# Patient Record
Sex: MALE | Race: WHITE | NOT HISPANIC OR LATINO | Employment: OTHER | URBAN - METROPOLITAN AREA
[De-identification: names, ages, dates, MRNs, and addresses within clinical notes are randomized per-mention and may not be internally consistent; named-entity substitution may affect disease eponyms.]

---

## 2023-09-06 PROBLEM — R07.9 CHEST PAIN: Status: ACTIVE | Noted: 2023-09-06

## 2023-09-06 PROBLEM — M47.26 OSTEOARTHRITIS OF SPINE WITH RADICULOPATHY, LUMBAR REGION: Status: ACTIVE | Noted: 2023-09-06

## 2023-09-06 PROBLEM — K57.90 DIVERTICULOSIS: Status: ACTIVE | Noted: 2023-09-06

## 2023-09-06 PROBLEM — E78.5 HYPERLIPIDEMIA: Status: ACTIVE | Noted: 2023-09-06

## 2023-09-06 PROBLEM — K86.2 PANCREATIC CYST (HHS-HCC): Status: ACTIVE | Noted: 2023-09-06

## 2023-09-06 PROBLEM — R91.8 PULMONARY NODULES/LESIONS, MULTIPLE: Status: ACTIVE | Noted: 2023-09-06

## 2023-09-06 PROBLEM — R06.02 SHORTNESS OF BREATH: Status: ACTIVE | Noted: 2023-09-06

## 2023-09-06 PROBLEM — I25.10 CAD (CORONARY ARTERY DISEASE): Status: ACTIVE | Noted: 2023-09-06

## 2023-09-06 PROBLEM — K46.9 HERNIA, ABDOMINAL: Status: ACTIVE | Noted: 2023-09-06

## 2023-09-06 PROBLEM — I71.20 THORACIC AORTIC ANEURYSM (CMS-HCC): Status: ACTIVE | Noted: 2023-09-06

## 2023-09-06 PROBLEM — I10 HTN (HYPERTENSION): Status: ACTIVE | Noted: 2023-09-06

## 2023-09-06 PROBLEM — C34.91 ADENOCARCINOMA OF RIGHT LUNG (MULTI): Status: ACTIVE | Noted: 2023-09-06

## 2023-09-06 RX ORDER — LISINOPRIL 2.5 MG/1
1 TABLET ORAL DAILY
COMMUNITY
Start: 2018-11-05 | End: 2023-11-28 | Stop reason: SDUPTHER

## 2023-09-06 RX ORDER — SILDENAFIL 25 MG/1
25 TABLET, FILM COATED ORAL
COMMUNITY

## 2023-09-06 RX ORDER — FLUTICASONE PROPIONATE 50 MCG
1 SPRAY, SUSPENSION (ML) NASAL DAILY PRN
COMMUNITY

## 2023-09-06 RX ORDER — SIMVASTATIN 20 MG/1
20 TABLET, FILM COATED ORAL NIGHTLY
COMMUNITY
End: 2024-01-09 | Stop reason: ALTCHOICE

## 2023-09-06 RX ORDER — PANTOPRAZOLE SODIUM 40 MG/1
40 TABLET, DELAYED RELEASE ORAL DAILY PRN
Status: ON HOLD | COMMUNITY
Start: 2016-09-22 | End: 2024-01-22 | Stop reason: ENTERED-IN-ERROR

## 2023-09-06 RX ORDER — TOPIRAMATE 25 MG/1
0.5 TABLET ORAL DAILY
COMMUNITY
Start: 2021-06-11 | End: 2023-11-28 | Stop reason: SDUPTHER

## 2023-09-06 RX ORDER — SUCRALFATE 1 G/1
1 TABLET ORAL AS NEEDED
Status: ON HOLD | COMMUNITY
End: 2024-01-22 | Stop reason: ENTERED-IN-ERROR

## 2023-09-06 RX ORDER — LANOLIN ALCOHOL/MO/W.PET/CERES
100 CREAM (GRAM) TOPICAL DAILY
COMMUNITY

## 2023-09-06 RX ORDER — ALPRAZOLAM 0.5 MG/1
1 TABLET ORAL NIGHTLY PRN
COMMUNITY
Start: 2023-02-27 | End: 2023-11-28 | Stop reason: SDUPTHER

## 2023-10-09 NOTE — PROGRESS NOTES
"Subjective   Tao Renee  is a 73 y.o. male who presents for   Chief Complaint   Patient presents with    Follow-up     Tao Renee is here for a follow up visit with a CT scan prior.       Briefly, this is a 72 male who presented to me with a pulmonary nodule. He underwent percutaneous biopsy and was found to have lung cancer. I recommended the patient undergo thoracoscopic lobectomy. This was performed on June 9, 2016 at [facility]. The patient had no intraoperative or postoperative complications.     In October 2019, I was concerned about a right-sided lung nodule which had changed in characteristic. I recommended a percutaneous lung biopsy, which was performed on 10/23/19. This was a difficult procedure and the results suggested only \"atypical\" findings. Subsequent imaging continued to be abnormal, and I recommended repeat percutaneous biopsy, and this was confirmed to be a new lipidic well-differentiated invasive adenocarcinoma. PET/CT 3/5/2020 suggested isolated cancer, and he was referred for ablative radiation therapy given his aversion to repeat surgery. The patient was treated with hypofractionated radiation from 4/21/2020 to 5/11/2020. I recommended close radiographic surveillance after this, and he presents today after a recent CT scan    Currently the patient is in their usual state of health. They deny the following symptoms: chest pain, cough, shortness of breath at rest, fevers, chills, weight loss, and abdominal pain.      There have been no significant changes to their documented medical, surgical and family history.      Cancer-related family history is not on file.    Social History    Tobacco Use      Smoking status: Not on file      Smokeless tobacco: Not on file      Objective   Physical Exam  The patient is well-appearing and in no acute distress. The trachea is midline and there is no crepitus. The lungs were clear to auscultation grossly. There was good effort and excursion. The " heart had a regular rate and rhythm. The abdomen was soft, nontender and nondistended. The extremities had no edema or gross deformities. Mood and affect are appropriate.    Diagnostic Studies  I have reviewed a CT from Today, which showed subtle increase in size and a small right upper lobe nodule as well as increasing size and a cavitary lesion in the superior segment of the right lower lobe.  This superior segmental mass was previously 1.5 cm in size now measures approximately 1.8    Assessment/Plan     Overall, I believe that the patient has concerning findings and requires additional workup.     The interval changes in this patient's nodules are concerning for a possible third primary lung cancer.  I believe it is reasonable to have this patient evaluated by interventional pulmonary for consideration of navigational bronchoscopy based biopsy of these abnormalities.  I believe this is preferred to CT-guided biopsy is as the cavitary nature of the right lower lobe lesion is unlikely to be diagnostic by CT-guided biopsy.  I discussed this all with the patient in detail.  He is amenable to the proposed approach.    I recommend referral to interventional pulmonary for venus bronch based biopsy .     I discussed this in detail with the patient, including a discussion of alternatives. They were comfortable with this approach.     Stephan Bronson MD

## 2023-10-11 ENCOUNTER — HOSPITAL ENCOUNTER (OUTPATIENT)
Dept: RADIOLOGY | Facility: CLINIC | Age: 73
Discharge: HOME | End: 2023-10-11
Payer: MEDICARE

## 2023-10-11 ENCOUNTER — OFFICE VISIT (OUTPATIENT)
Dept: SURGERY | Facility: CLINIC | Age: 73
End: 2023-10-11
Payer: MEDICARE

## 2023-10-11 VITALS
RESPIRATION RATE: 18 BRPM | SYSTOLIC BLOOD PRESSURE: 133 MMHG | DIASTOLIC BLOOD PRESSURE: 70 MMHG | WEIGHT: 246.2 LBS | BODY MASS INDEX: 29.07 KG/M2 | OXYGEN SATURATION: 97 % | HEART RATE: 78 BPM | HEIGHT: 77 IN | TEMPERATURE: 98.1 F

## 2023-10-11 DIAGNOSIS — R91.8 PULMONARY NODULES/LESIONS, MULTIPLE: Primary | ICD-10-CM

## 2023-10-11 DIAGNOSIS — C34.91 MALIGNANT NEOPLASM OF UNSPECIFIED PART OF RIGHT BRONCHUS OR LUNG (MULTI): ICD-10-CM

## 2023-10-11 DIAGNOSIS — C34.91 ADENOCARCINOMA OF RIGHT LUNG (MULTI): ICD-10-CM

## 2023-10-11 PROCEDURE — 71250 CT THORAX DX C-: CPT

## 2023-10-11 PROCEDURE — 1036F TOBACCO NON-USER: CPT | Performed by: THORACIC SURGERY (CARDIOTHORACIC VASCULAR SURGERY)

## 2023-10-11 PROCEDURE — 3075F SYST BP GE 130 - 139MM HG: CPT | Performed by: THORACIC SURGERY (CARDIOTHORACIC VASCULAR SURGERY)

## 2023-10-11 PROCEDURE — 99215 OFFICE O/P EST HI 40 MIN: CPT | Performed by: THORACIC SURGERY (CARDIOTHORACIC VASCULAR SURGERY)

## 2023-10-11 PROCEDURE — 1126F AMNT PAIN NOTED NONE PRSNT: CPT | Performed by: THORACIC SURGERY (CARDIOTHORACIC VASCULAR SURGERY)

## 2023-10-11 PROCEDURE — 1159F MED LIST DOCD IN RCRD: CPT | Performed by: THORACIC SURGERY (CARDIOTHORACIC VASCULAR SURGERY)

## 2023-10-11 PROCEDURE — 71250 CT THORAX DX C-: CPT | Performed by: SURGERY

## 2023-10-11 PROCEDURE — 3078F DIAST BP <80 MM HG: CPT | Performed by: THORACIC SURGERY (CARDIOTHORACIC VASCULAR SURGERY)

## 2023-10-11 SDOH — ECONOMIC STABILITY: FOOD INSECURITY: WITHIN THE PAST 12 MONTHS, THE FOOD YOU BOUGHT JUST DIDN'T LAST AND YOU DIDN'T HAVE MONEY TO GET MORE.: NEVER TRUE

## 2023-10-11 SDOH — ECONOMIC STABILITY: FOOD INSECURITY: WITHIN THE PAST 12 MONTHS, YOU WORRIED THAT YOUR FOOD WOULD RUN OUT BEFORE YOU GOT MONEY TO BUY MORE.: NEVER TRUE

## 2023-10-11 ASSESSMENT — PATIENT HEALTH QUESTIONNAIRE - PHQ9
2. FEELING DOWN, DEPRESSED OR HOPELESS: NOT AT ALL
1. LITTLE INTEREST OR PLEASURE IN DOING THINGS: NOT AT ALL
SUM OF ALL RESPONSES TO PHQ9 QUESTIONS 1 AND 2: 0

## 2023-10-11 ASSESSMENT — ENCOUNTER SYMPTOMS
DEPRESSION: 0
LOSS OF SENSATION IN FEET: 0
OCCASIONAL FEELINGS OF UNSTEADINESS: 0

## 2023-10-11 ASSESSMENT — COLUMBIA-SUICIDE SEVERITY RATING SCALE - C-SSRS
1. IN THE PAST MONTH, HAVE YOU WISHED YOU WERE DEAD OR WISHED YOU COULD GO TO SLEEP AND NOT WAKE UP?: NO
2. HAVE YOU ACTUALLY HAD ANY THOUGHTS OF KILLING YOURSELF?: NO
6. HAVE YOU EVER DONE ANYTHING, STARTED TO DO ANYTHING, OR PREPARED TO DO ANYTHING TO END YOUR LIFE?: NO

## 2023-10-11 ASSESSMENT — PAIN SCALES - GENERAL: PAINLEVEL: 0-NO PAIN

## 2023-10-16 DIAGNOSIS — R91.8 PULMONARY NODULES/LESIONS, MULTIPLE: Primary | ICD-10-CM

## 2023-10-18 DIAGNOSIS — Z01.818 PRE-OP EVALUATION: ICD-10-CM

## 2023-10-18 DIAGNOSIS — R91.1 LUNG NODULE: ICD-10-CM

## 2023-10-18 DIAGNOSIS — R91.8 PULMONARY NODULES/LESIONS, MULTIPLE: Primary | ICD-10-CM

## 2023-10-27 ENCOUNTER — APPOINTMENT (OUTPATIENT)
Dept: RADIOLOGY | Facility: CLINIC | Age: 73
End: 2023-10-27
Payer: MEDICARE

## 2023-10-27 ENCOUNTER — HOSPITAL ENCOUNTER (OUTPATIENT)
Dept: RADIOLOGY | Facility: CLINIC | Age: 73
End: 2023-10-27
Payer: MEDICARE

## 2023-11-16 ENCOUNTER — HOSPITAL ENCOUNTER (OUTPATIENT)
Dept: RADIOLOGY | Facility: CLINIC | Age: 73
Discharge: HOME | End: 2023-11-16
Payer: MEDICARE

## 2023-11-16 DIAGNOSIS — R91.8 PULMONARY NODULES/LESIONS, MULTIPLE: ICD-10-CM

## 2023-11-16 LAB — GLUCOSE BLD MANUAL STRIP-MCNC: 93 MG/DL (ref 74–99)

## 2023-11-16 PROCEDURE — 78815 PET IMAGE W/CT SKULL-THIGH: CPT | Mod: PS

## 2023-11-16 PROCEDURE — A9552 F18 FDG: HCPCS | Mod: MUE | Performed by: THORACIC SURGERY (CARDIOTHORACIC VASCULAR SURGERY)

## 2023-11-16 PROCEDURE — 78815 PET IMAGE W/CT SKULL-THIGH: CPT | Mod: PET TUMOR SUBSQ TX STRATEGY | Performed by: STUDENT IN AN ORGANIZED HEALTH CARE EDUCATION/TRAINING PROGRAM

## 2023-11-16 PROCEDURE — 3430000001 HC RX 343 DIAGNOSTIC RADIOPHARMACEUTICALS: Mod: MUE | Performed by: THORACIC SURGERY (CARDIOTHORACIC VASCULAR SURGERY)

## 2023-11-16 PROCEDURE — 82947 ASSAY GLUCOSE BLOOD QUANT: CPT

## 2023-11-16 RX ORDER — FLUDEOXYGLUCOSE F 18 200 MCI/ML
12.39 INJECTION, SOLUTION INTRAVENOUS
Status: COMPLETED | OUTPATIENT
Start: 2023-11-16 | End: 2023-11-16

## 2023-11-16 RX ADMIN — FLUDEOXYGLUCOSE F 18 12.39 MILLICURIE: 200 INJECTION, SOLUTION INTRAVENOUS at 13:11

## 2023-11-28 ENCOUNTER — OFFICE VISIT (OUTPATIENT)
Dept: PRIMARY CARE | Facility: CLINIC | Age: 73
End: 2023-11-28
Payer: MEDICARE

## 2023-11-28 VITALS
DIASTOLIC BLOOD PRESSURE: 81 MMHG | HEIGHT: 77 IN | WEIGHT: 246 LBS | BODY MASS INDEX: 29.05 KG/M2 | HEART RATE: 77 BPM | OXYGEN SATURATION: 92 % | SYSTOLIC BLOOD PRESSURE: 171 MMHG

## 2023-11-28 DIAGNOSIS — I25.10 CORONARY ARTERY DISEASE DUE TO LIPID RICH PLAQUE: ICD-10-CM

## 2023-11-28 DIAGNOSIS — I10 HYPERTENSION, UNSPECIFIED TYPE: Primary | ICD-10-CM

## 2023-11-28 DIAGNOSIS — F41.9 ANXIETY: ICD-10-CM

## 2023-11-28 DIAGNOSIS — I25.83 CORONARY ARTERY DISEASE DUE TO LIPID RICH PLAQUE: ICD-10-CM

## 2023-11-28 PROCEDURE — 99214 OFFICE O/P EST MOD 30 MIN: CPT | Performed by: FAMILY MEDICINE

## 2023-11-28 PROCEDURE — 1036F TOBACCO NON-USER: CPT | Performed by: FAMILY MEDICINE

## 2023-11-28 PROCEDURE — 1159F MED LIST DOCD IN RCRD: CPT | Performed by: FAMILY MEDICINE

## 2023-11-28 PROCEDURE — 1126F AMNT PAIN NOTED NONE PRSNT: CPT | Performed by: FAMILY MEDICINE

## 2023-11-28 PROCEDURE — 3079F DIAST BP 80-89 MM HG: CPT | Performed by: FAMILY MEDICINE

## 2023-11-28 PROCEDURE — 3077F SYST BP >= 140 MM HG: CPT | Performed by: FAMILY MEDICINE

## 2023-11-28 RX ORDER — LISINOPRIL 2.5 MG/1
2.5 TABLET ORAL DAILY
Qty: 90 TABLET | Refills: 1 | Status: SHIPPED | OUTPATIENT
Start: 2023-11-28

## 2023-11-28 RX ORDER — ALPRAZOLAM 0.5 MG/1
0.5 TABLET ORAL NIGHTLY PRN
Qty: 30 TABLET | Refills: 1 | Status: SHIPPED | OUTPATIENT
Start: 2023-11-28 | End: 2024-03-14 | Stop reason: SDUPTHER

## 2023-11-28 RX ORDER — TOPIRAMATE 25 MG/1
12.5 TABLET ORAL DAILY
Qty: 90 TABLET | Refills: 1 | Status: SHIPPED | OUTPATIENT
Start: 2023-11-28

## 2023-11-28 ASSESSMENT — ENCOUNTER SYMPTOMS
CARDIOVASCULAR NEGATIVE: 1
MUSCULOSKELETAL NEGATIVE: 1
CONSTITUTIONAL NEGATIVE: 1
RESPIRATORY NEGATIVE: 1

## 2023-11-28 NOTE — PROGRESS NOTES
Subjective   Patient ID: Tao Renee is a 73 y.o. male who presents for Follow-up, Med Refill, and Dizziness.  HPI  Patiently previously diagnosed with lung cancer had a lobectomy at this point they saw something else on his lung is going in for a follow-up biopsy he does not want more lung tissue removed is I told him before he makes any decisions we can certainly sit down and talk about it but lets get the biopsy and figure out what is going on first  Review of Systems   Constitutional: Negative.    HENT: Negative.     Respiratory: Negative.     Cardiovascular: Negative.    Musculoskeletal: Negative.        Objective   Physical Exam  Constitutional:       Appearance: Normal appearance.   HENT:      Head: Normocephalic and atraumatic.      Nose: Nose normal.      Mouth/Throat:      Mouth: Mucous membranes are moist.   Eyes:      Extraocular Movements: Extraocular movements intact.      Pupils: Pupils are equal, round, and reactive to light.   Cardiovascular:      Rate and Rhythm: Normal rate and regular rhythm.   Musculoskeletal:         General: Normal range of motion.      Cervical back: Normal range of motion.   Skin:     General: Skin is warm.   Neurological:      Mental Status: He is alert.   Psychiatric:         Mood and Affect: Mood normal.         Assessment/Plan   Diagnoses and all orders for this visit:  Hypertension, unspecified type  -     Comprehensive Metabolic Panel; Future  -     Lipid Panel; Future  -     lisinopril 2.5 mg tablet; Take 1 tablet (2.5 mg) by mouth once daily.  -     topiramate (Topamax) 25 mg tablet; Take 0.5 tablets (12.5 mg) by mouth once daily.  -     ALPRAZolam (Xanax) 0.5 mg tablet; Take 1 tablet (0.5 mg) by mouth as needed at bedtime for anxiety.  Coronary artery disease due to lipid rich plaque  Anxiety  -     ALPRAZolam (Xanax) 0.5 mg tablet; Take 1 tablet (0.5 mg) by mouth as needed at bedtime for anxiety.

## 2023-11-29 ENCOUNTER — LAB (OUTPATIENT)
Dept: LAB | Facility: LAB | Age: 73
End: 2023-11-29
Payer: MEDICARE

## 2023-11-29 DIAGNOSIS — I10 HYPERTENSION, UNSPECIFIED TYPE: ICD-10-CM

## 2023-11-29 DIAGNOSIS — R91.1 LUNG NODULE: ICD-10-CM

## 2023-11-29 DIAGNOSIS — Z01.818 PRE-OP EVALUATION: ICD-10-CM

## 2023-11-29 LAB
ALBUMIN SERPL BCP-MCNC: 3.8 G/DL (ref 3.4–5)
ALP SERPL-CCNC: 66 U/L (ref 33–136)
ALT SERPL W P-5'-P-CCNC: 13 U/L (ref 10–52)
ANION GAP SERPL CALC-SCNC: 12 MMOL/L (ref 10–20)
AST SERPL W P-5'-P-CCNC: 10 U/L (ref 9–39)
BILIRUB SERPL-MCNC: 0.7 MG/DL (ref 0–1.2)
BUN SERPL-MCNC: 14 MG/DL (ref 6–23)
CALCIUM SERPL-MCNC: 9.1 MG/DL (ref 8.6–10.6)
CHLORIDE SERPL-SCNC: 107 MMOL/L (ref 98–107)
CHOLEST SERPL-MCNC: 196 MG/DL (ref 0–199)
CHOLESTEROL/HDL RATIO: 3.9
CO2 SERPL-SCNC: 27 MMOL/L (ref 21–32)
CREAT SERPL-MCNC: 1.01 MG/DL (ref 0.5–1.3)
ERYTHROCYTE [DISTWIDTH] IN BLOOD BY AUTOMATED COUNT: 13.7 % (ref 11.5–14.5)
GFR SERPL CREATININE-BSD FRML MDRD: 78 ML/MIN/1.73M*2
GLUCOSE SERPL-MCNC: 85 MG/DL (ref 74–99)
HCT VFR BLD AUTO: 51.5 % (ref 41–52)
HDLC SERPL-MCNC: 50.3 MG/DL
HGB BLD-MCNC: 16.3 G/DL (ref 13.5–17.5)
LDLC SERPL CALC-MCNC: 128 MG/DL
MCH RBC QN AUTO: 28.5 PG (ref 26–34)
MCHC RBC AUTO-ENTMCNC: 31.7 G/DL (ref 32–36)
MCV RBC AUTO: 90 FL (ref 80–100)
MUCOUS THREADS #/AREA URNS AUTO: NORMAL /LPF
NON HDL CHOLESTEROL: 146 MG/DL (ref 0–149)
NRBC BLD-RTO: 0 /100 WBCS (ref 0–0)
PLATELET # BLD AUTO: 248 X10*3/UL (ref 150–450)
POTASSIUM SERPL-SCNC: 4.6 MMOL/L (ref 3.5–5.3)
PROT SERPL-MCNC: 6.5 G/DL (ref 6.4–8.2)
RBC # BLD AUTO: 5.71 X10*6/UL (ref 4.5–5.9)
RBC #/AREA URNS AUTO: NORMAL /HPF
SODIUM SERPL-SCNC: 141 MMOL/L (ref 136–145)
SQUAMOUS #/AREA URNS AUTO: NORMAL /HPF
TRIGL SERPL-MCNC: 88 MG/DL (ref 0–149)
VLDL: 18 MG/DL (ref 0–40)
WBC # BLD AUTO: 8.4 X10*3/UL (ref 4.4–11.3)
WBC #/AREA URNS AUTO: NORMAL /HPF

## 2023-11-29 PROCEDURE — 81001 URINALYSIS AUTO W/SCOPE: CPT

## 2023-11-29 PROCEDURE — 36415 COLL VENOUS BLD VENIPUNCTURE: CPT

## 2023-11-29 PROCEDURE — 80061 LIPID PANEL: CPT

## 2023-11-29 PROCEDURE — 80053 COMPREHEN METABOLIC PANEL: CPT

## 2023-11-29 PROCEDURE — 85027 COMPLETE CBC AUTOMATED: CPT

## 2023-11-30 NOTE — PROGRESS NOTES
Patient: Tao Renee    [id number]  : 1950 -- AGE 73 y.o.    Provider: KRISTYN nAg-CNP     [facility]   Service Date: 2023       Department of Medicine  Division of Pulmonary, Critical Care, and Sleep Medicine           [facility]  New Visit Note    Virtual or Telephone Consent  A telephone visit (audio only) between the patient (at the originating site) and the provider (at the distant site) was utilized to provide this telehealth service.   Verbal consent was requested and obtained from Tao Renee on this date, 23 for a telehealth visit.     HISTORY OF PRESENT ILLNESS     PCP: Dr. Rubio Tariq  Thoracic: Dr. Bronson    HISTORY OF PRESENT ILLNESS   Tao Renee is a 73 y.o. male who presents to a [facility] for an evaluation with concerns of right lung nodule. I have independently interviewed and examined the patient in the office and reviewed available records.    In summary, patient was found to have non-small cell lung cancer and underwent a right middle lobectomy in  followed by radiation therapy.  Some years later, there was an additional right-sided lung nodule and an attempted percutaneous lung biopsy was done on 10/23/2019; path showing atypical findings.  This nodule continued to be monitored and was then proven to be well differentiated invasive adenocarcinoma.  PET/CT from 3/5/2020 suggested isolated cancer and he was treated with hypofractionated radiation from 2020 to 2020.     He is been continually monitored ever since.  CT chest from 10/11/2023 has shown slight interval enlargement of a previously identified irregular spiculated nodule in the paramediastinal right upper lobe measuring 2.6 x 2.1 cm (previously 15 x 13 mm).  There are additional scattered lung nodules; specifically a 18 mm RLL cavitary nodule.  PET/CT completed on 2023 showing  right medial paramediastinal nodular opacity with suggestive interval decrease in metabolic activity most compatible with inflammatory process/posttreatment changes. Minimal PET activity in the RLL nodule. Imaging and PET reviewed by Dr. Mathew and Dr. Dia.    Patient was then referred to interventional pulmonology for further assessment and tissue biopsy.    Current History    On today's visit, the patient reports no SOB at rest or [place]. No current or prior use of inhalers. Does report a cough from sinus drainage and waking up with phelgm in the morning. Nothing chronic. No wheezing. No hemoptysis. Weight overall has been stable. No fever, sweats, chills. No chest pain, palpitations. Intermittent occasional GERD; will use OTC Pepcid PRN. Questionable orthopnea. No swelling of the lower legs. Left foot will intermittently swell.    No premature birth. No childhood pulmonary issues. Had pneumonia about 25 years ago. No other hospitalizations for his breathing outside of the lung surgery listed above.      REVIEW OF SYSTEMS     REVIEW OF SYSTEMS  Review of Systems   Constitutional:  Negative for activity change, appetite change, chills, fatigue, fever and unexpected weight change.   HENT:  Positive for postnasal drip. Negative for congestion, rhinorrhea, sinus pressure, sinus pain, sneezing, sore throat, trouble swallowing and voice change.    Eyes:  Negative for redness and itching.   Respiratory:  Negative for cough, chest tightness, shortness of breath, wheezing and stridor.    Cardiovascular:  Negative for chest pain, palpitations and leg swelling.        Denies orthopnea   Gastrointestinal:  Negative for abdominal pain, diarrhea, nausea and vomiting.        Occasional acid reflux   Musculoskeletal:  Positive for back pain. Negative for arthralgias, joint swelling and myalgias.   Skin:  Negative for rash.   Allergic/Immunologic: Negative for immunocompromised state.   Neurological:  Negative for dizziness,  tremors, weakness and headaches.   Hematological:  Does not bruise/bleed easily.   Psychiatric/Behavioral:  Negative for agitation and sleep disturbance. The patient is not nervous/anxious.         Denies depression   All other systems reviewed and are negative.        ALLERGIES AND MEDICATIONS     ALLERGIES  Allergies   Allergen Reactions    Atorvastatin Itching    Azithromycin Diarrhea       MEDICATIONS  Current Outpatient Medications   Medication Sig Dispense Refill    ALPRAZolam (Xanax) 0.5 mg tablet Take 1 tablet (0.5 mg) by mouth as needed at bedtime for anxiety. 30 tablet 1    cyanocobalamin (Vitamin B-12) 1,000 mcg tablet Take 1 tablet (1,000 mcg) by mouth once daily.      fluticasone (Flonase Allergy Relief) 50 mcg/actuation nasal spray Administer 1 spray into each nostril once daily as needed.      lisinopril 2.5 mg tablet Take 1 tablet (2.5 mg) by mouth once daily. 90 tablet 1    NON FORMULARY Organic bitter apricot kernels 3-4 a day      pantoprazole (ProtoNix) 40 mg EC tablet Take 1 tablet (40 mg) by mouth once daily as needed. In the morning      pyridoxine HCl, vitamin B6, (VITAMIN B-6 ORAL) Take 100 mg by mouth.      sildenafil (Viagra) 25 mg tablet Take 1 tablet (25 mg) by mouth. 1 hour before needed      sucralfate (Carafate) 1 gram tablet Take 1 tablet (1 g) by mouth if needed.      thiamine 100 mg tablet Take by mouth.      topiramate (Topamax) 25 mg tablet Take 0.5 tablets (12.5 mg) by mouth once daily. 90 tablet 1    simvastatin (Zocor) 20 mg tablet Take 1 tablet (20 mg) by mouth once daily at bedtime.       No current facility-administered medications for this visit.       PAST HISTORY     PAST MEDICAL HISTORY  He  has a past medical history of Diverticulitis of intestine, part unspecified, without perforation or abscess without bleeding (10/14/2015), Personal history of other diseases of the digestive system (10/14/2015), and Personal history of other diseases of urinary system  "(10/14/2015).    PAST SURGICAL HISTORY  Past Surgical History:   Procedure Laterality Date    COLONOSCOPY  10/14/2015    Colonoscopy (Fiberoptic)    CT ABDOMEN PELVIS ANGIOGRAM W AND/OR WO IV CONTRAST  1/5/2020    CT ABDOMEN PELVIS ANGIOGRAM W AND/OR WO IV CONTRAST 1/5/2020 [facility] CLINICAL LEGACY    CT GUIDED PERCUTANEOUS BIOPSY LUNG  5/4/2016    CT GUIDED PERCUTANEOUS BIOPSY LUNG [place] [facility] [place]    CT GUIDED PERCUTANEOUS BIOPSY LUNG  10/23/2019    CT GUIDED PERCUTANEOUS BIOPSY LUNG [place] [facility] [place] [place]    CT GUIDED PERCUTANEOUS BIOPSY LUNG  2/27/2020    CT GUIDED PERCUTANEOUS BIOPSY LUNG 2/27/2020 [facility] LEGACY    OTHER SURGICAL HISTORY  06/22/2016    Thoracoscopy (Therapeutic) W/ Lobectomy    SMALL INTESTINE SURGERY  10/14/2015    Small Bowel Resection       IMMUNIZATION HISTORY  Immunization History   Administered Date(s) Administered    Flu vaccine (IIV4), preservative free *Check age/dose* 12/26/2018    Pneumococcal conjugate vaccine, 13-valent (PREVNAR 13) 05/26/2016    Pneumococcal polysaccharide vaccine, 23-valent, age 2 years and older (PNEUMOVAX 23) 03/16/2020    Zoster, live 05/26/2016       SOCIAL HISTORY  He  reports that he quit smoking about 22 years ago. His smoking use included cigarettes. He started smoking about 56 years ago. He has a 68.00 pack-year smoking history. He has never used smokeless tobacco. He reports current alcohol use. He reports that he does not currently use drugs.       FAMILY HISTORY  Family History   Family history unknown: Yes     PHYSICAL EXAM     VITAL SIGNS: There were no vitals taken for this visit.     PREVIOUS WEIGHTS:  Wt Readings from Last 3 Encounters:   11/28/23 112 kg (246 lb)   10/11/23 112 kg (246 lb 3.2 oz)   04/12/23 112 kg (247 lb 2 oz)       Physical Exam  No physical exam performed; virtual visit.    RESULTS/DATA     Pulmonary Function Test Results   No results found for: \"EJD1JVQ\", \"FEV1\", \"FVC\", \"CIV9447\", \"TLC\", \"RVTLC\", \"DLCO\"      Chest " Radiograph     No results found for this or any previous visit from the past 365 days.      Chest CT Scan   PET CT  11/16/23: IMPRESSION: 1. Right medial paramediastinal nodular opacity with successive interval decrease in metabolic activity, most compatible with inflammatory process/post treatment changes. Attention on follow-up study 2. Grossly stable non or minimal FDG avid ground-glass opacities/pulmonary nodules in both lungs when compared to prior study. 3. No new concerning FDG avid disease identified.    CT Chest  10/11/23: IMPRESSION: There is slight interval enlargement of previously identified irregular spiculated nodule in the paramediastinal right upper lobe, which now measures up to 2.6 by 2.1 cm in transaxial diameters, 2.2 cm in CC diameter (previously 15 x 13 mm) in transaxial dimensions and 2.0 cm in CC diameter. There is a similar appearance of an additional smaller irregular nodule in the right upper lobe with spiculated margins measuring up to 10 mm in length, as well as of a semisolid nodular region more laterally in the right upper lobe and additional scattered semisolid and ground-glass nodules in the bilateral lungs, predominating in the upper lobes, which are concerning for areas of neoplasia such as adenomatous hyperplasia or early lung cancer versus scarring, versus less likely a multifocal chronic indolent or recurrent inflammatory process. There is a cystic/cavitary air-filled irregular nodule in the posteromedial aspect of the left lower lobe, measuring up to 17 mm in length on transaxial imaging, increased in size from previously at which time it measured up to 15 mm. There is a small solid component along the anterior margin of this nodule. This is concerning for neoplasm. The above described abnormalities are superimposed on a background of moderate emphysema. Right partial pneumonectomy changes are again seen. Few patchy subpleural platelike opacities are in keeping with minor  atelectasis or scar. No focal consolidation, pleural effusion, or pneumothorax.  4/13/23: IMPRESSION: Stable appearance of the chest. Spiculated right upper lobe nodule with a few scattered other parenchymal abnormalities that are unchanged. Postsurgical changes seen in the right lung. Continued surveillance is advised. Consider follow-up in 6 months. Postsurgical changes seen in the right lung. Stable incidental findings. Fatty infiltration of the liver.  1/11/23: IMPRESSION: 1. The dominant spiculated mass in the right upper lobe abutting the mediastinal pleural surface is stable in size in appearance. 2. There are several nodular opacities that are also stable. There is 1 opacity in the anterior right upper lobe that is slightly increased in size compared to the prior and warrants attention on follow-up.  Other images in the EMR      Echocardiogram & Cardiac Studies   No results found for this or any previous visit from the past 365 days.     Cardiac Nuclear Stress Test  12/3/19: IMPRESSION: 1. Normal exercise Myoview stress test with evidence for diaphragmatic attenuation artifact. 2. Normal segmental function on the gated images within exercise ejection fraction of 80%.  Labwork & Pathology   Complete Blood Count  Lab Results   Component Value Date    WBC 8.4 11/29/2023    HGB 16.3 11/29/2023    HCT 51.5 11/29/2023    MCV 90 11/29/2023     11/29/2023       Peripheral Eosinophil Count:   Eosinophils Absolute (x10E9/L)   Date Value   01/05/2020 0.11       Metabolic Parameters  Sodium   Date/Time Value Ref Range Status   11/29/2023 09:37  136 - 145 mmol/L Final     Potassium   Date/Time Value Ref Range Status   11/29/2023 09:37 AM 4.6 3.5 - 5.3 mmol/L Final     Chloride   Date/Time Value Ref Range Status   11/29/2023 09:37  98 - 107 mmol/L Final     Bicarbonate   Date/Time Value Ref Range Status   11/29/2023 09:37 AM 27 21 - 32 mmol/L Final     Anion Gap   Date/Time Value Ref Range Status  "  11/29/2023 09:37 AM 12 10 - 20 mmol/L Final     Urea Nitrogen   Date/Time Value Ref Range Status   11/29/2023 09:37 AM 14 6 - 23 mg/dL Final     Creatinine   Date/Time Value Ref Range Status   11/29/2023 09:37 AM 1.01 0.50 - 1.30 mg/dL Final     GFR MALE   Date/Time Value Ref Range Status   11/04/2022 06:59 AM 67 >90 mL/min/1.73m2 Final     Comment:      CALCULATIONS OF ESTIMATED GFR ARE PERFORMED   USING THE 2021 CKD-EPI STUDY REFIT EQUATION   WITHOUT THE RACE VARIABLE FOR THE IDMS-TRACEABLE   CREATININE METHODS.    https://jasn.asnjournals.org/content/early/2021/09/22/ASN.3452882178       Glucose   Date/Time Value Ref Range Status   11/29/2023 09:37 AM 85 74 - 99 mg/dL Final     Calcium   Date/Time Value Ref Range Status   11/29/2023 09:37 AM 9.1 8.6 - 10.6 mg/dL Final     AST   Date/Time Value Ref Range Status   11/29/2023 09:37 AM 10 9 - 39 U/L Final     ALT   Date/Time Value Ref Range Status   11/29/2023 09:37 AM 13 10 - 52 U/L Final     Comment:     Patients treated with Sulfasalazine may generate falsely decreased results for ALT.       Coagulation Parameters  Protime   Date/Time Value Ref Range Status   02/27/2020 10:01 AM 11.6 9.7 - 12.7 sec Final     INR   Date/Time Value Ref Range Status   02/27/2020 10:01 AM 1.0 0.9 - 1.1 Final       Serum Immunoglobulin E:    No results found for: \"IGE\"     Bronchoscopy & Sputum Cultures       ASSESSMENT/PLAN     Mr. Renee is a 73 y.o. male; was referred to the [facility] for evaluation of right lung nodule.    Problem List and Orders  Diagnoses and all orders for this visit:  Pulmonary nodules/lesions, multiple      Assessment and Plan / Recommendations:  Patient's visit was converted to a virtual visit.    1. Right lung nodule/mass: 18mm cavitary lesion RLL along with right medial paramediastinal nodular opacity. Hx of NSCLC s/p RML lobectomy in 2016.  - Plan for bronchoscopy with biopsy. ENB/RAB to RLL cavitary nodule + full " staging EBUS. CT navigational prior.  - Lab work prior to procedure; done 11/29/23.  - Not on any anticoagulation     I explained the procedure to the patient. We discussed that the bronchoscopy will be performed by a member of the Interventional Pulmonary Team; depending on scheduling and provider availability. We also discussed that the IP providers function as a team and not infrequently may have to fill in for one another if there are emergent issues that need attention at the same time. The patient / family expressed understanding and agreed to proceed. All questions were answered.     Patient's visit was converted to a virtual visit. Spoke with the patient via phone for 13 minutes.    Prep: 10 minutes  Phone/Video: 13 minutes  Total: 23 minutes

## 2023-12-01 ENCOUNTER — TELEMEDICINE (OUTPATIENT)
Dept: PULMONOLOGY | Facility: CLINIC | Age: 73
End: 2023-12-01
Payer: MEDICARE

## 2023-12-01 DIAGNOSIS — R91.8 PULMONARY NODULES/LESIONS, MULTIPLE: Primary | ICD-10-CM

## 2023-12-01 PROCEDURE — 99443 PR PHYS/QHP TELEPHONE EVALUATION 21-30 MIN: CPT | Performed by: NURSE PRACTITIONER

## 2023-12-01 RX ORDER — LANOLIN ALCOHOL/MO/W.PET/CERES
1000 CREAM (GRAM) TOPICAL DAILY
COMMUNITY

## 2023-12-01 ASSESSMENT — ENCOUNTER SYMPTOMS
EYE REDNESS: 0
BRUISES/BLEEDS EASILY: 0
VOMITING: 0
SORE THROAT: 0
STRIDOR: 0
DIARRHEA: 0
TROUBLE SWALLOWING: 0
MYALGIAS: 0
RHINORRHEA: 0
VOICE CHANGE: 0
NAUSEA: 0
UNEXPECTED WEIGHT CHANGE: 0
BACK PAIN: 1
PALPITATIONS: 0
WHEEZING: 0
APPETITE CHANGE: 0
AGITATION: 0
SHORTNESS OF BREATH: 0
SINUS PRESSURE: 0
CHILLS: 0
FEVER: 0
COUGH: 0
EYE ITCHING: 0
WEAKNESS: 0
ARTHRALGIAS: 0
ABDOMINAL PAIN: 0
HEADACHES: 0
ROS GI COMMENTS: OCCASIONAL ACID REFLUX
CHEST TIGHTNESS: 0
FATIGUE: 0
TREMORS: 0
SINUS PAIN: 0
DIZZINESS: 0
ACTIVITY CHANGE: 0
NERVOUS/ANXIOUS: 0
SLEEP DISTURBANCE: 0
JOINT SWELLING: 0

## 2023-12-01 ASSESSMENT — LIFESTYLE VARIABLES
HOW MANY STANDARD DRINKS CONTAINING ALCOHOL DO YOU HAVE ON A TYPICAL DAY: 3 OR 4
SKIP TO QUESTIONS 9-10: 0
AUDIT-C TOTAL SCORE: 3
HOW OFTEN DO YOU HAVE SIX OR MORE DRINKS ON ONE OCCASION: NEVER
HOW OFTEN DO YOU HAVE A DRINK CONTAINING ALCOHOL: 2-4 TIMES A MONTH

## 2023-12-01 ASSESSMENT — PATIENT HEALTH QUESTIONNAIRE - PHQ9
1. LITTLE INTEREST OR PLEASURE IN DOING THINGS: NOT AT ALL
2. FEELING DOWN, DEPRESSED OR HOPELESS: NOT AT ALL
SUM OF ALL RESPONSES TO PHQ9 QUESTIONS 1 & 2: 0

## 2023-12-08 ENCOUNTER — HOSPITAL ENCOUNTER (OUTPATIENT)
Dept: GASTROENTEROLOGY | Facility: HOSPITAL | Age: 73
Discharge: HOME | End: 2023-12-08
Payer: MEDICARE

## 2023-12-08 ENCOUNTER — ANESTHESIA (OUTPATIENT)
Dept: GASTROENTEROLOGY | Facility: HOSPITAL | Age: 73
End: 2023-12-08
Payer: MEDICARE

## 2023-12-08 ENCOUNTER — ANESTHESIA EVENT (OUTPATIENT)
Dept: GASTROENTEROLOGY | Facility: HOSPITAL | Age: 73
End: 2023-12-08
Payer: MEDICARE

## 2023-12-08 ENCOUNTER — HOSPITAL ENCOUNTER (OUTPATIENT)
Dept: RADIOLOGY | Facility: HOSPITAL | Age: 73
Discharge: HOME | End: 2023-12-08
Payer: MEDICARE

## 2023-12-08 VITALS
HEART RATE: 68 BPM | DIASTOLIC BLOOD PRESSURE: 57 MMHG | RESPIRATION RATE: 14 BRPM | HEIGHT: 76 IN | SYSTOLIC BLOOD PRESSURE: 125 MMHG | OXYGEN SATURATION: 98 % | TEMPERATURE: 97.5 F | BODY MASS INDEX: 29.83 KG/M2 | WEIGHT: 245 LBS

## 2023-12-08 DIAGNOSIS — R91.8 PULMONARY NODULES/LESIONS, MULTIPLE: ICD-10-CM

## 2023-12-08 DIAGNOSIS — R91.1 LUNG NODULE: ICD-10-CM

## 2023-12-08 PROBLEM — G62.9 PERIPHERAL NEUROPATHY: Status: ACTIVE | Noted: 2023-12-08

## 2023-12-08 PROCEDURE — 2500000004 HC RX 250 GENERAL PHARMACY W/ HCPCS (ALT 636 FOR OP/ED): Performed by: ANESTHESIOLOGIST ASSISTANT

## 2023-12-08 PROCEDURE — 71250 CT THORAX DX C-: CPT | Performed by: RADIOLOGY

## 2023-12-08 PROCEDURE — 31652 BRONCH EBUS SAMPLNG 1/2 NODE: CPT | Performed by: STUDENT IN AN ORGANIZED HEALTH CARE EDUCATION/TRAINING PROGRAM

## 2023-12-08 PROCEDURE — 88173 CYTOPATH EVAL FNA REPORT: CPT | Performed by: PATHOLOGY

## 2023-12-08 PROCEDURE — 88173 CYTOPATH EVAL FNA REPORT: CPT | Mod: TC,MCY | Performed by: STUDENT IN AN ORGANIZED HEALTH CARE EDUCATION/TRAINING PROGRAM

## 2023-12-08 PROCEDURE — 88172 CYTP DX EVAL FNA 1ST EA SITE: CPT | Performed by: PATHOLOGY

## 2023-12-08 PROCEDURE — 7100000009 HC PHASE TWO TIME - INITIAL BASE CHARGE

## 2023-12-08 PROCEDURE — 7100000010 HC PHASE TWO TIME - EACH INCREMENTAL 1 MINUTE

## 2023-12-08 PROCEDURE — 88333 PATH CONSLTJ SURG CYTO XM 1: CPT | Performed by: PATHOLOGY

## 2023-12-08 PROCEDURE — 3700000001 HC GENERAL ANESTHESIA TIME - INITIAL BASE CHARGE

## 2023-12-08 PROCEDURE — 88305 TISSUE EXAM BY PATHOLOGIST: CPT | Performed by: PATHOLOGY

## 2023-12-08 PROCEDURE — 88305 TISSUE EXAM BY PATHOLOGIST: CPT | Mod: TC,SUR | Performed by: STUDENT IN AN ORGANIZED HEALTH CARE EDUCATION/TRAINING PROGRAM

## 2023-12-08 PROCEDURE — 88333 PATH CONSLTJ SURG CYTO XM 1: CPT | Mod: TC,MCY | Performed by: STUDENT IN AN ORGANIZED HEALTH CARE EDUCATION/TRAINING PROGRAM

## 2023-12-08 PROCEDURE — 7100000002 HC RECOVERY ROOM TIME - EACH INCREMENTAL 1 MINUTE

## 2023-12-08 PROCEDURE — 3700000002 HC GENERAL ANESTHESIA TIME - EACH INCREMENTAL 1 MINUTE

## 2023-12-08 PROCEDURE — A31653 PR BRNCHSC EBUS GUIDED SAMPL 3/> NODE STATION/STRUX: Performed by: ANESTHESIOLOGY

## 2023-12-08 PROCEDURE — A31653 PR BRNCHSC EBUS GUIDED SAMPL 3/> NODE STATION/STRUX: Performed by: ANESTHESIOLOGIST ASSISTANT

## 2023-12-08 PROCEDURE — 7100000001 HC RECOVERY ROOM TIME - INITIAL BASE CHARGE

## 2023-12-08 PROCEDURE — 31627 NAVIGATIONAL BRONCHOSCOPY: CPT | Performed by: STUDENT IN AN ORGANIZED HEALTH CARE EDUCATION/TRAINING PROGRAM

## 2023-12-08 PROCEDURE — 88305 TISSUE EXAM BY PATHOLOGIST: CPT | Performed by: STUDENT IN AN ORGANIZED HEALTH CARE EDUCATION/TRAINING PROGRAM

## 2023-12-08 PROCEDURE — 31628 BRONCHOSCOPY/LUNG BX EACH: CPT | Performed by: STUDENT IN AN ORGANIZED HEALTH CARE EDUCATION/TRAINING PROGRAM

## 2023-12-08 PROCEDURE — 88312 SPECIAL STAINS GROUP 1: CPT | Performed by: STUDENT IN AN ORGANIZED HEALTH CARE EDUCATION/TRAINING PROGRAM

## 2023-12-08 PROCEDURE — 2720000007 HC OR 272 NO HCPCS

## 2023-12-08 PROCEDURE — 99100 ANES PT EXTEME AGE<1 YR&>70: CPT | Performed by: ANESTHESIOLOGY

## 2023-12-08 PROCEDURE — 2500000005 HC RX 250 GENERAL PHARMACY W/O HCPCS: Performed by: ANESTHESIOLOGIST ASSISTANT

## 2023-12-08 PROCEDURE — 71250 CT THORAX DX C-: CPT

## 2023-12-08 RX ORDER — MIDAZOLAM HYDROCHLORIDE 1 MG/ML
INJECTION, SOLUTION INTRAMUSCULAR; INTRAVENOUS AS NEEDED
Status: DISCONTINUED | OUTPATIENT
Start: 2023-12-08 | End: 2023-12-08

## 2023-12-08 RX ORDER — NORETHINDRONE AND ETHINYL ESTRADIOL 0.5-0.035
KIT ORAL AS NEEDED
Status: DISCONTINUED | OUTPATIENT
Start: 2023-12-08 | End: 2023-12-08

## 2023-12-08 RX ORDER — SODIUM CHLORIDE, SODIUM LACTATE, POTASSIUM CHLORIDE, CALCIUM CHLORIDE 600; 310; 30; 20 MG/100ML; MG/100ML; MG/100ML; MG/100ML
100 INJECTION, SOLUTION INTRAVENOUS CONTINUOUS
Status: CANCELLED | OUTPATIENT
Start: 2023-12-08

## 2023-12-08 RX ORDER — LIDOCAINE HYDROCHLORIDE 20 MG/ML
INJECTION, SOLUTION INFILTRATION; PERINEURAL AS NEEDED
Status: DISCONTINUED | OUTPATIENT
Start: 2023-12-08 | End: 2023-12-08

## 2023-12-08 RX ORDER — ONDANSETRON HYDROCHLORIDE 2 MG/ML
INJECTION, SOLUTION INTRAVENOUS AS NEEDED
Status: DISCONTINUED | OUTPATIENT
Start: 2023-12-08 | End: 2023-12-08

## 2023-12-08 RX ORDER — ACETAMINOPHEN 325 MG/1
650 TABLET ORAL EVERY 4 HOURS PRN
Status: CANCELLED | OUTPATIENT
Start: 2023-12-08

## 2023-12-08 RX ORDER — PROPOFOL 10 MG/ML
INJECTION, EMULSION INTRAVENOUS CONTINUOUS PRN
Status: DISCONTINUED | OUTPATIENT
Start: 2023-12-08 | End: 2023-12-08

## 2023-12-08 RX ORDER — ROCURONIUM BROMIDE 10 MG/ML
INJECTION, SOLUTION INTRAVENOUS AS NEEDED
Status: DISCONTINUED | OUTPATIENT
Start: 2023-12-08 | End: 2023-12-08

## 2023-12-08 RX ORDER — PROPOFOL 10 MG/ML
INJECTION, EMULSION INTRAVENOUS AS NEEDED
Status: DISCONTINUED | OUTPATIENT
Start: 2023-12-08 | End: 2023-12-08

## 2023-12-08 RX ORDER — DEXAMETHASONE SODIUM PHOSPHATE 4 MG/ML
INJECTION, SOLUTION INTRA-ARTICULAR; INTRALESIONAL; INTRAMUSCULAR; INTRAVENOUS; SOFT TISSUE AS NEEDED
Status: DISCONTINUED | OUTPATIENT
Start: 2023-12-08 | End: 2023-12-08

## 2023-12-08 RX ORDER — ONDANSETRON HYDROCHLORIDE 2 MG/ML
4 INJECTION, SOLUTION INTRAVENOUS ONCE AS NEEDED
Status: CANCELLED | OUTPATIENT
Start: 2023-12-08

## 2023-12-08 RX ORDER — LIDOCAINE HYDROCHLORIDE 10 MG/ML
0.1 INJECTION INFILTRATION; PERINEURAL ONCE
Status: CANCELLED | OUTPATIENT
Start: 2023-12-08 | End: 2023-12-08

## 2023-12-08 RX ORDER — PHENYLEPHRINE HCL IN 0.9% NACL 0.4MG/10ML
SYRINGE (ML) INTRAVENOUS AS NEEDED
Status: DISCONTINUED | OUTPATIENT
Start: 2023-12-08 | End: 2023-12-08

## 2023-12-08 RX ADMIN — ROCURONIUM BROMIDE 20 MG: 50 INJECTION, SOLUTION INTRAVENOUS at 11:03

## 2023-12-08 RX ADMIN — ROCURONIUM BROMIDE 50 MG: 50 INJECTION, SOLUTION INTRAVENOUS at 10:34

## 2023-12-08 RX ADMIN — Medication 120 MCG: at 11:05

## 2023-12-08 RX ADMIN — Medication 80 MCG: at 11:13

## 2023-12-08 RX ADMIN — DEXAMETHASONE SODIUM PHOSPHATE 4 MG: 4 INJECTION, SOLUTION INTRAMUSCULAR; INTRAVENOUS at 10:42

## 2023-12-08 RX ADMIN — SUGAMMADEX 200 MG: 100 INJECTION, SOLUTION INTRAVENOUS at 13:02

## 2023-12-08 RX ADMIN — SODIUM CHLORIDE, SODIUM LACTATE, POTASSIUM CHLORIDE, AND CALCIUM CHLORIDE: 600; 310; 30; 20 INJECTION, SOLUTION INTRAVENOUS at 10:22

## 2023-12-08 RX ADMIN — ROCURONIUM BROMIDE 10 MG: 50 INJECTION, SOLUTION INTRAVENOUS at 11:44

## 2023-12-08 RX ADMIN — PROPOFOL 140 MG: 10 INJECTION, EMULSION INTRAVENOUS at 10:34

## 2023-12-08 RX ADMIN — ONDANSETRON 4 MG: 2 INJECTION INTRAMUSCULAR; INTRAVENOUS at 12:59

## 2023-12-08 RX ADMIN — Medication 120 MCG: at 11:20

## 2023-12-08 RX ADMIN — LIDOCAINE HYDROCHLORIDE 60 MG: 20 INJECTION, SOLUTION INFILTRATION; PERINEURAL at 10:34

## 2023-12-08 RX ADMIN — Medication 120 MCG: at 10:58

## 2023-12-08 RX ADMIN — PROPOFOL 60 MG: 10 INJECTION, EMULSION INTRAVENOUS at 12:04

## 2023-12-08 RX ADMIN — PROPOFOL 150 MCG/KG/MIN: 10 INJECTION, EMULSION INTRAVENOUS at 10:34

## 2023-12-08 RX ADMIN — Medication 80 MCG: at 11:37

## 2023-12-08 RX ADMIN — Medication 80 MCG: at 10:51

## 2023-12-08 RX ADMIN — ROCURONIUM BROMIDE 10 MG: 50 INJECTION, SOLUTION INTRAVENOUS at 12:33

## 2023-12-08 RX ADMIN — ROCURONIUM BROMIDE 20 MG: 50 INJECTION, SOLUTION INTRAVENOUS at 12:04

## 2023-12-08 RX ADMIN — EPHEDRINE SULFATE 5 MG: 50 INJECTION, SOLUTION INTRAVENOUS at 11:39

## 2023-12-08 RX ADMIN — Medication 120 MCG: at 11:08

## 2023-12-08 RX ADMIN — MIDAZOLAM 2 MG: 1 INJECTION INTRAMUSCULAR; INTRAVENOUS at 10:24

## 2023-12-08 ASSESSMENT — COLUMBIA-SUICIDE SEVERITY RATING SCALE - C-SSRS
2. HAVE YOU ACTUALLY HAD ANY THOUGHTS OF KILLING YOURSELF?: NO
1. IN THE PAST MONTH, HAVE YOU WISHED YOU WERE DEAD OR WISHED YOU COULD GO TO SLEEP AND NOT WAKE UP?: NO
6. HAVE YOU EVER DONE ANYTHING, STARTED TO DO ANYTHING, OR PREPARED TO DO ANYTHING TO END YOUR LIFE?: NO

## 2023-12-08 ASSESSMENT — PAIN - FUNCTIONAL ASSESSMENT
PAIN_FUNCTIONAL_ASSESSMENT: 0-10
PAIN_FUNCTIONAL_ASSESSMENT: 0-10

## 2023-12-08 ASSESSMENT — PAIN SCALES - GENERAL
PAINLEVEL_OUTOF10: 0 - NO PAIN

## 2023-12-08 NOTE — H&P
History Of Present Illness    72 year-old man who presents for evaluation of right middle lobe lung cancer status post thoracoscopic resection in 2016 and right upper lobe lung cancer ( adenocarcinoma) status postradiation treatment in 2020.    He is scheduled for navigational bronchoscopy for RUL nodule increasing in size concenring fo0r malignancy although the nodule is not PET avid.     Denies shortness of breath, cough, chest tightness, wheezing         Past Medical History  Past Medical History:   Diagnosis Date    Diverticulitis of intestine, part unspecified, without perforation or abscess without bleeding 10/14/2015    Diverticulitis    Lung cancer (CMS/HCC)     Personal history of other diseases of the digestive system 10/14/2015    History of hiatal hernia    Personal history of other diseases of urinary system 10/14/2015    History of hematuria       Surgical History  Past Surgical History:   Procedure Laterality Date    COLONOSCOPY  10/14/2015    Colonoscopy (Fiberoptic)    CT ABDOMEN PELVIS ANGIOGRAM W AND/OR WO IV CONTRAST  01/05/2020    CT ABDOMEN PELVIS ANGIOGRAM W AND/OR WO IV CONTRAST 1/5/2020 [facility] CLINICAL LEGACY    CT GUIDED PERCUTANEOUS BIOPSY LUNG  05/04/2016    CT GUIDED PERCUTANEOUS BIOPSY LUNG 5/4/2016 [facility] AIB LEGACY    CT GUIDED PERCUTANEOUS BIOPSY LUNG  10/23/2019    CT GUIDED PERCUTANEOUS BIOPSY LUNG 10/23/2019 [facility] AIB LEGACY    CT GUIDED PERCUTANEOUS BIOPSY LUNG  02/27/2020    CT GUIDED PERCUTANEOUS BIOPSY LUNG 2/27/2020 [facility] AIB LEGACY    LUNG LOBECTOMY Right     OTHER SURGICAL HISTORY  06/22/2016    Thoracoscopy (Therapeutic) W/ Lobectomy    SMALL INTESTINE SURGERY  10/14/2015    Small Bowel Resection        Social History  He reports that he quit smoking about 22 years ago. His smoking use included cigarettes. He started smoking about 56 years ago. He has a 68.00 pack-year smoking history. He has never used smokeless tobacco. He reports current alcohol use of about 6.0 standard drinks  "of alcohol per week. He reports that he does not currently use drugs.    Family History  Family History   Family history unknown: Yes        Allergies  Atorvastatin and Azithromycin    Review of Systems   All other systems reviewed and are negative.      Last Recorded Vitals  Blood pressure 128/63, pulse 82, temperature 36 °C (96.8 °F), temperature source Temporal, resp. rate 16, height 1.93 m (6' 4\"), weight 111 kg (245 lb), SpO2 98 %.    Relevant Results  NM PET CT lung CA initial diagnosis    Result Date: 11/18/2023  Interpreted By:  Leslie Carney and Bamfo Rose STUDY: NM PET CT LUNG CA  INITIAL DIAGNOSIS;  11/16/2023 2:49 pm   INDICATION: Signs/Symptoms:diagnostic.     73-year-old male with right middle lobe adenocarcinoma status post lobectomy in 2016, with additional right upper lobe nodule demonstrated to be invasive adenocarcinoma. Patient underwent radiation therapy in May of 2020 to the right upper lobe.   Per EMR note on 10/11/2023: Currently being referred to interventional pulmonary for navigational bronchoscopy based biopsy of a cavitary right lower lobe lesion.   COMPARISON: CT chest on 10/11/2023 PET-CT on 01/25/2022 PET-CT 02/05/2021   ACCESSION NUMBER(S): [id number]   ORDERING CLINICIAN: ROULA DE OLIVEIRA   TECHNIQUE: DIVISION OF NUCLEAR MEDICINE POSITRON EMISSION TOMOGRAPHY (PET-CT)   The patient received an intravenous dose of 12.4 mCi of Fluorine-18 fluorodeoxyglucose (FDG).   Positron emission tomographic (PET) images from skull base to mid-thigh were then acquired after a one hour delay.  Also acquired was a contemporaneous low dose noncontrast CT scan performed for attenuation correction of PET images and anatomic localization.  The PET and CT images were digitally fused for display.  All images were acquired on a combined PET-CT scanner unit.  Some areas of FDG accumulation may be described in standardized uptake value (SUV) units.   CODING:   Subsequent Treatment Strategy (PS)   " CALIBRATION:   Dose Injection-to-Scan Interval (mins): 64 Mediastinal bloodpool SUV (normal 1.5-2.5): 1.8 Blood glucose: 93 mg/dL   FINDINGS: NECK: *Non FDG avid opacification in bilateral maxillary sinuses, nonspecific. *No enlarged or FDG avid cervical lymphadenopathy. *Thyroid glands are unremarkable   CHEST: *Postsurgical changes seen in the right middle lobe. Redemonstration of minimal FDG avid nodular opacity along the right median paramediastinum appears to grossly similar in size compared to prior PET-CT. This demonstrates low level metabolic activity (max SUV 1.8, previously 2.1 on 01/25/2022,, further decreased from 3.0 on 02/05/2021). *An additional right upper lobe pulmonary nodule demonstrates faint metabolic activity above background and appears similar to prior exam (max SUV 1.1, stable). Redemonstration of another non FDG avid pulmonary nodule in the right upper lobe. *Similar appearance of a cystic/cavitary air-filled irregular nodule in the posteromedial aspect of the right lower lobe with a small solid component compared to CT on 10/11/2023, with minimal FDG avidity with (SUV max 1.0). *There is a ground-glass opacity in the anterior left upper lobe with minimal FDG uptake (max SUV 0.7). There is an additional ground-glass opacity in the superior left lower lobe with minimal FDG uptake (max SUV 0.5). *No enlarged or FDG avid hilar, mediastinal, or axillary lymphadenopathy.   ABDOMEN/PELVIS: *Physiologic radiotracer uptake in the liver, spleen, with excretion from kidneys into bladder. Multiple liver cysts. Redemonstration of known duodenal diverticulum. Bilateral renal cysts. *Both adrenal glands are unremarkable *No enlarged or FDG avid lymphadenopathy in the abdomen pelvis.   MUSCULOSKELETAL: *No concerning FDG avid bone lesion throughout the axial and appendicular skeleton       1. Right medial paramediastinal nodular opacity with successive interval decrease in metabolic activity, most  compatible with inflammatory process/post treatment changes. Attention on follow-up study 2. Grossly stable non or minimal FDG avid ground-glass opacities/pulmonary nodules in both lungs when compared to prior study. 3. No new concerning FDG avid disease identified.   I personally reviewed the images/study and I agree with radiology resident Dr. Nasreen Rodriguez's findings as stated. This study was interpreted at [facility], [place], [place].   MACRO: None   Signed by: Leslie Carney 11/18/2023 9:44 AM Dictation workstation:   [id number]          Assessment/Plan       73 year-old male-    Former smoker   History of RML lung cancer post thoracoscopic resection in 2016 and right upper lobe lung cancer ( adenocarcinoma) status postradiation treatment in 2020.    **RUL nodule, progressively increasing in size concerning for malignancy    Plan:    Navigational bronchoscopy/biopsy of lesion/ EBUS       Niki Chau MD

## 2023-12-08 NOTE — ANESTHESIA POSTPROCEDURE EVALUATION
Patient: Tao Renee    Procedure Summary       Date: 12/08/23 Room / Location: [facility]    Anesthesia Start: 1020 Anesthesia Stop: 1326    Procedure: BRONCHOSCOPY Diagnosis: Lung nodule    Scheduled Providers: Torin Vázquez MD; Ne Price RN; Anila Roach MD Responsible Provider: Anila Roach MD    Anesthesia Type: general ASA Status: 3            Anesthesia Type: general    Vitals Value Taken Time   /65 12/08/23 1339   Temp 36.4 °C (97.5 °F) 12/08/23 1309   Pulse 65 12/08/23 1339   Resp 18 12/08/23 1339   SpO2 97 % 12/08/23 1339       Anesthesia Post Evaluation    Patient location during evaluation: PACU  Patient participation: complete - patient participated  Level of consciousness: awake  Pain management: adequate  Airway patency: patent  Cardiovascular status: acceptable  Respiratory status: acceptable  Hydration status: acceptable  Postoperative Nausea and Vomiting: none        No notable events documented.

## 2023-12-08 NOTE — ANESTHESIA PREPROCEDURE EVALUATION
Patient: Tao Renee    Procedure Information       Date/Time: 12/08/23 1100    Scheduled providers: Torin Vázquez MD; Ne Price RN; Anila Roach MD    Procedure: BRONCHOSCOPY    Location: [facility]            Relevant Problems   Anesthesia (within normal limits)  4cm thoracic aneurysm per CT 2 yrs ago      Cardiovascular   (+) CAD (coronary artery disease)   (+) Chest pain   (+) HTN (hypertension)   (+) Hyperlipidemia   (+) Thoracic aortic aneurysm (CMS/HCC) (4cm as of 2 yrs ago)      Endocrine (within normal limits)      GI (within normal limits)      /Renal (within normal limits)      Neuro/Psych  [place] palsy   (+) Peripheral neuropathy      Pulmonary   (+) Adenocarcinoma of right lung (CMS/HCC)   (+) Pulmonary nodules/lesions, multiple      GI/Hepatic (within normal limits)      Hematology (within normal limits)      Musculoskeletal   (+) Osteoarthritis of spine with radiculopathy, lumbar region      Eyes, Ears, Nose, and Throat (within normal limits)      Infectious Disease (within normal limits)     71 year-old man who presents follow-up evaluation of lung cancer after thoracoscopic right middle lobectomy for lung cancer on June 9, 2016, who has a new diagnosis of right upper lobe lung cancer.   Clinical information reviewed:   Tobacco  Allergies  Meds   Med Hx  Surg Hx   Fam Hx  Soc Hx        NPO Detail:  NPO/Void Status  Date of Last Liquid: 12/08/23  Time of Last Liquid: 0800  Date of Last Solid: 12/07/23  Time of Last Solid: 2000         Physical Exam    Airway  TM distance: >3 FB  Neck ROM: full     Cardiovascular - normal exam     Dental   Comments: #12 glued cap   Pulmonary - normal exam     Abdominal          Vitals:    12/08/23 0941   BP: 128/63   Pulse: 82   Resp: 16   Temp: 36 °C (96.8 °F)   SpO2: 98%       Past Surgical History:   Procedure Laterality Date    COLONOSCOPY  10/14/2015    Colonoscopy (Fiberoptic)    CT ABDOMEN PELVIS ANGIOGRAM W AND/OR WO IV  CONTRAST  01/05/2020    CT ABDOMEN PELVIS ANGIOGRAM W AND/OR WO IV CONTRAST 1/5/2020 [facility] CLINICAL LEGACY    CT GUIDED PERCUTANEOUS BIOPSY LUNG  05/04/2016    CT GUIDED PERCUTANEOUS BIOPSY LUNG 5/4/2016 [facility] AIB LEGACY    CT GUIDED PERCUTANEOUS BIOPSY LUNG  10/23/2019    CT GUIDED PERCUTANEOUS BIOPSY LUNG 10/23/2019 [facility] AIB LEGACY    CT GUIDED PERCUTANEOUS BIOPSY LUNG  02/27/2020    CT GUIDED PERCUTANEOUS BIOPSY LUNG 2/27/2020 [facility]GACY    LUNG LOBECTOMY Right     OTHER SURGICAL HISTORY  06/22/2016    Thoracoscopy (Therapeutic) W/ Lobectomy    SMALL INTESTINE SURGERY  10/14/2015    Small Bowel Resection     Past Medical History:   Diagnosis Date    Diverticulitis of intestine, part unspecified, without perforation or abscess without bleeding 10/14/2015    Diverticulitis    Lung cancer (CMS/HCC)     Personal history of other diseases of the digestive system 10/14/2015    History of hiatal hernia    Personal history of other diseases of urinary system 10/14/2015    History of hematuria       Current Outpatient Medications:     ALPRAZolam (Xanax) 0.5 mg tablet, Take 1 tablet (0.5 mg) by mouth as needed at bedtime for anxiety., Disp: 30 tablet, Rfl: 1    lisinopril 2.5 mg tablet, Take 1 tablet (2.5 mg) by mouth once daily., Disp: 90 tablet, Rfl: 1    NON FORMULARY, Organic bitter apricot kernels 3-4 a day, Disp: , Rfl:     pantoprazole (ProtoNix) 40 mg EC tablet, Take 1 tablet (40 mg) by mouth once daily as needed. In the morning, Disp: , Rfl:     sucralfate (Carafate) 1 gram tablet, Take 1 tablet (1 g) by mouth if needed., Disp: , Rfl:     thiamine 100 mg tablet, Take by mouth., Disp: , Rfl:     topiramate (Topamax) 25 mg tablet, Take 0.5 tablets (12.5 mg) by mouth once daily., Disp: 90 tablet, Rfl: 1    cyanocobalamin (Vitamin B-12) 1,000 mcg tablet, Take 1 tablet (1,000 mcg) by mouth once daily., Disp: , Rfl:     fluticasone (Flonase Allergy Relief) 50 mcg/actuation nasal spray, Administer 1 spray into each  nostril once daily as needed., Disp: , Rfl:     pyridoxine HCl, vitamin B6, (VITAMIN B-6 ORAL), Take 100 mg by mouth., Disp: , Rfl:     sildenafil (Viagra) 25 mg tablet, Take 1 tablet (25 mg) by mouth. 1 hour before needed, Disp: , Rfl:     simvastatin (Zocor) 20 mg tablet, Take 1 tablet (20 mg) by mouth once daily at bedtime., Disp: , Rfl:   Prior to Admission medications    Medication Sig Start Date End Date Taking? Authorizing Provider   ALPRAZolam (Xanax) 0.5 mg tablet Take 1 tablet (0.5 mg) by mouth as needed at bedtime for anxiety. 11/28/23  Yes Rubio Tariq, DO   lisinopril 2.5 mg tablet Take 1 tablet (2.5 mg) by mouth once daily. 11/28/23  Yes Rubio Tariq, DO   NON FORMULARY Organic bitter apricot kernels 3-4 a day   Yes Historical Provider, MD   pantoprazole (ProtoNix) 40 mg EC tablet Take 1 tablet (40 mg) by mouth once daily as needed. In the morning 9/22/16  Yes Historical Provider, MD   sucralfate (Carafate) 1 gram tablet Take 1 tablet (1 g) by mouth if needed.   Yes Historical Provider, MD   thiamine 100 mg tablet Take by mouth.   Yes Historical Provider, MD   topiramate (Topamax) 25 mg tablet Take 0.5 tablets (12.5 mg) by mouth once daily. 11/28/23  Yes Rubio Tariq,    cyanocobalamin (Vitamin B-12) 1,000 mcg tablet Take 1 tablet (1,000 mcg) by mouth once daily.    Historical Provider, MD   fluticasone (Flonase Allergy Relief) 50 mcg/actuation nasal spray Administer 1 spray into each nostril once daily as needed.    Historical Provider, MD   pyridoxine HCl, vitamin B6, (VITAMIN B-6 ORAL) Take 100 mg by mouth.    Historical Provider, MD   sildenafil (Viagra) 25 mg tablet Take 1 tablet (25 mg) by mouth. 1 hour before needed    Historical Provider, MD   simvastatin (Zocor) 20 mg tablet Take 1 tablet (20 mg) by mouth once daily at bedtime.    Historical Provider, MD     Allergies   Allergen Reactions    Atorvastatin Itching    Azithromycin Diarrhea     Social History     Tobacco Use     Smoking status: Former     Packs/day: 2.00     Years: 34.00     Additional pack years: 0.00     Total pack years: 68.00     Types: Cigarettes     Start date:      Quit date:      Years since quittin.9    Smokeless tobacco: Never   Substance Use Topics    Alcohol use: Yes     Alcohol/week: 6.0 standard drinks of alcohol     Types: 6 Standard drinks or equivalent per week     Comment: occasional         Chemistry    Lab Results   Component Value Date/Time     2023 0937    K 4.6 2023 0937     2023 0937    CO2 27 2023 0937    BUN 14 2023 0937    CREATININE 1.01 2023 0937    Lab Results   Component Value Date/Time    CALCIUM 9.1 2023 0937    ALKPHOS 66 2023 0937    AST 10 2023 0937    ALT 13 2023 0937    BILITOT 0.7 2023 0937          Lab Results   Component Value Date/Time    WBC 8.4 2023 0937    HGB 16.3 2023 0937    HCT 51.5 2023 0937     2023 0937     Lab Results   Component Value Date/Time    PROTIME 11.6 2020 1001    INR 1.0 2020 1001     No results found for this or any previous visit (from the past 4464 hour(s)).  No results found for this or any previous visit from the past 1095 days.     Anesthesia Plan    ASA 3     general     intravenous induction   Anesthetic plan and risks discussed with patient.  Use of blood products discussed with patient who consented to blood products.

## 2023-12-08 NOTE — ANESTHESIA PROCEDURE NOTES
Airway  Date/Time: 12/8/2023 10:39 AM  Urgency: elective    Airway not difficult    Staffing  Performed: resident   Authorized by: Anila Roach MD    Performed by: KAREN Garcia  Patient location during procedure: OR    Indications and Patient Condition  Indications for airway management: anesthesia and airway protection  Spontaneous ventilation: present  Sedation level: minimal  Preoxygenated: yes  Patient position: sniffing  MILS maintained throughout  Mask difficulty assessment: 1 - vent by mask  Planned trial extubation    Final Airway Details  Final airway type: endotracheal airway      Successful airway: ETT  Cuffed: yes   Successful intubation technique: direct laryngoscopy  Facilitating devices/methods: intubating stylet  Endotracheal tube insertion site: oral  Blade: Katia  Blade size: #4  ETT size (mm): 8.5  Cormack-Lehane Classification: grade I - full view of glottis  Placement verified by: chest auscultation and capnometry   Measured from: lips  ETT to lips (cm): 21  Number of attempts at approach: 1  Ventilation between attempts: none  Number of other approaches attempted: 0    Additional Comments  Intubated by OM[facility] resident

## 2023-12-11 LAB
LABORATORY COMMENT REPORT: NORMAL
LABORATORY COMMENT REPORT: NORMAL
PATH REPORT.FINAL DX SPEC: NORMAL
PATH REPORT.GROSS SPEC: NORMAL
PATH REPORT.INTRAOP OBS SPEC DOC: NORMAL
PATH REPORT.TOTAL CANCER: NORMAL

## 2023-12-12 DIAGNOSIS — C34.91 ADENOCARCINOMA OF RIGHT LUNG (MULTI): Primary | ICD-10-CM

## 2023-12-12 LAB
LABORATORY COMMENT REPORT: NORMAL
PATH REPORT.COMMENTS IMP SPEC: NORMAL
PATH REPORT.FINAL DX SPEC: NORMAL
PATH REPORT.GROSS SPEC: NORMAL
PATH REPORT.TOTAL CANCER: NORMAL

## 2023-12-14 ENCOUNTER — OFFICE VISIT (OUTPATIENT)
Dept: HEMATOLOGY/ONCOLOGY | Facility: HOSPITAL | Age: 73
End: 2023-12-14
Payer: MEDICARE

## 2023-12-14 VITALS
DIASTOLIC BLOOD PRESSURE: 79 MMHG | OXYGEN SATURATION: 99 % | HEIGHT: 76 IN | HEART RATE: 90 BPM | BODY MASS INDEX: 29.85 KG/M2 | SYSTOLIC BLOOD PRESSURE: 184 MMHG | RESPIRATION RATE: 19 BRPM | TEMPERATURE: 97.3 F | WEIGHT: 245.15 LBS

## 2023-12-14 DIAGNOSIS — C34.91 ADENOCARCINOMA OF RIGHT LUNG (MULTI): Primary | ICD-10-CM

## 2023-12-14 PROCEDURE — 3078F DIAST BP <80 MM HG: CPT | Performed by: INTERNAL MEDICINE

## 2023-12-14 PROCEDURE — 3077F SYST BP >= 140 MM HG: CPT | Performed by: INTERNAL MEDICINE

## 2023-12-14 PROCEDURE — 1159F MED LIST DOCD IN RCRD: CPT | Performed by: INTERNAL MEDICINE

## 2023-12-14 PROCEDURE — 99215 OFFICE O/P EST HI 40 MIN: CPT | Mod: GC | Performed by: INTERNAL MEDICINE

## 2023-12-14 PROCEDURE — 99205 OFFICE O/P NEW HI 60 MIN: CPT | Performed by: INTERNAL MEDICINE

## 2023-12-14 PROCEDURE — 1036F TOBACCO NON-USER: CPT | Performed by: INTERNAL MEDICINE

## 2023-12-14 PROCEDURE — 1126F AMNT PAIN NOTED NONE PRSNT: CPT | Performed by: INTERNAL MEDICINE

## 2023-12-14 ASSESSMENT — PAIN SCALES - GENERAL: PAINLEVEL: 0-NO PAIN

## 2023-12-14 NOTE — PROGRESS NOTES
"Patient ID: Tao Renee is a 73 y.o. 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 Stephan Bronson    RadOnc:  Dr. Luisito Salinas    Current Therapy:  TBD    Prior Therapy   2016: RML lobectomy for [id number] well differentiated adenocarcinoma  2020: Hypofractionated RT (60 Gy over 15f) due to close proximity to esophagus/trachea    Molecular Genetics    From 02/27/2020 biopsy of RUL:    PD-L1 TPS 10%    MICROSATELLITE STATUS: Microsatellite Stable (NAYA)      DISEASE ASSOCIATED GENOMIC FINDINGS:  CDKN2A p.R067Mnp*8 ([id number]: c.332dupG)  CTNNB1 p.S37F (NM_[id number]: c.110C>T), subclonal  EGFR p.G719D (NM_005228: c.2156G>A)  EGFR p.S768I (NM_[id number]: c.2303G>T)        INTERPRETATION AND POTENTIAL THERAPEUTIC OPTIONS:  EGFR p.G719D and p.S768I   FDA RECOMMENDATIONS: Afatinib (NSCLC)      DISEASE RELEVANT ALTERATIONS NOT DETECTED:  Negative for ALK fusion.  Negative for BRAF V600E.  Negative for NTRK fusion.  Negative for ROS1 fusion.    Sites of Disease  4R, RUL \"atypical cells\"    Oncologic Issues  NA     Past Medical History: Tao has a past medical history of Diverticulitis of intestine, part unspecified, without perforation or abscess without bleeding (10/14/2015), Lung cancer (CMS/HCC), Personal history of other diseases of the digestive system (10/14/2015), and Personal history of other diseases of urinary system (10/14/2015).  Surgical History:  Tao has a past surgical history that includes Colonoscopy (10/14/2015); Small intestine surgery (10/14/2015); Other surgical history (06/22/2016); CT guided percutaneous biopsy lung (05/04/2016); CT guided percutaneous biopsy lung (10/23/2019); CT angio abdomen pelvis w and or wo IV IV contrast (01/05/2020); CT guided percutaneous biopsy lung (02/27/2020); and Lung lobectomy " "(Right).  Social History:  Born in [place], now lives in [facility]. 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 Lives alone, 2 brothers and 1 sisters all local. No kids. Smoked 2 PPD x30 years, quit in 2001. Drinks 2-3 nights a week. No illicits. Worship. [employer].    Family History:    Family History   Family history unknown: Yes     Family Oncology History:  No family history of malignancy.    SUBJECTIVE:    History of Present Illness:  Tao Renee is a 73 y.o. male who was referred by Stephan Bronson MD and presents with recurrence.    Patient's oncologic history documented above but briefly he has had 2 instances of well differentiated adenocarcinoma, initially of RML in 2016 s/p lobectomy by Dr. Bronson and second in 2020 of RUL treated with hypofractionated RT by Dr. Salinas. He has been followed in surveillance by Dr. Bronson with RUL nodule showing subtle increase in size as well as superior segmental mass increasing from 1.5 to 1.8 cm over 6 months. He was referred to IP and underwent bronchoscopy on 12/8/23. Pathology from RUL FNA showed rare atypical cells, 4R showed a well differentiated adenocarcinoma. Station 7, 11RS and bronchial washings were all nondiagnostic. He is referred for medical oncology consultation.    On inteview patient feels well. He denies dyspnea, weight loss, dygeusia, fevers/chills/night sweats, N/V/D/C, urinary changes. 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 He is fully independent at home, lives close to his siblings and is completely at baseline.     OBJECTIVE:    VS / Pain:  BP (!) 184/79   Pulse 90   Temp 36.3 °C (97.3 °F) (Core)   Resp 19   Ht 1.919 m (6' 3.55\")   Wt 111 kg (245 lb 2.4 oz)   SpO2 99%   BMI 30.20 kg/m²   BSA: 2.43 meters squared   Pain Scale: 0    Physical Exam  Constitutional:       Appearance: Normal appearance. He is " well-developed.   HENT:      Head: Normocephalic and atraumatic.      Right Ear: External ear normal. No tenderness.      Left Ear: External ear normal. No tenderness.      Nose: Nose normal.      Mouth/Throat:      Mouth: Mucous membranes are moist. No injury or oral lesions.      Tongue: No lesions.      Pharynx: Oropharynx is clear.   Eyes:      Extraocular Movements: Extraocular movements intact.      Conjunctiva/sclera: Conjunctivae normal.      Pupils: Pupils are equal, round, and reactive to light.   Neck:      Thyroid: No thyroid mass.   Cardiovascular:      Rate and Rhythm: Normal rate and regular rhythm.   Pulmonary:      Effort: Pulmonary effort is normal. No respiratory distress.      Breath sounds: Normal breath sounds.   Abdominal:      General: Bowel sounds are normal. There is no distension or abdominal bruit.      Palpations: Abdomen is soft. There is no mass.      Tenderness: There is no abdominal tenderness.   Musculoskeletal:         General: Normal range of motion.      Cervical back: Normal range of motion and neck supple. No signs of trauma. Normal range of motion.      Right lower leg: No edema.      Left lower leg: No edema.   Lymphadenopathy:      Cervical: No cervical adenopathy.      Upper Body:      Right upper body: No axillary adenopathy.      Left upper body: No axillary adenopathy.   Skin:     General: Skin is warm and dry.      Findings: No lesion or rash.   Neurological:      General: No focal deficit present.      Mental Status: He is alert and oriented to person, place, and time.      Gait: Gait is intact.   Psychiatric:         Mood and Affect: Mood and affect normal.         Behavior: Behavior is cooperative.       Performance Status: ECOG 0       Diagnostic Results         WBC   Date/Time Value Ref Range Status   11/29/2023 09:37 AM 8.4 4.4 - 11.3 x10*3/uL Final   01/06/2020 07:40 AM 8.7 4.4 - 11.3 x10E9/L Final   01/05/2020 10:28 AM 9.6 4.4 - 11.3 x10E9/L Final   10/17/2019  "03:57 PM 9.1 4.4 - 11.3 x10E9/L Final     Hemoglobin   Date Value Ref Range Status   11/29/2023 16.3 13.5 - 17.5 g/dL Final   01/06/2020 14.7 13.5 - 17.5 g/dL Final   01/05/2020 17.3 13.5 - 17.5 g/dL Final   10/17/2019 15.9 13.5 - 17.5 g/dL Final     MCV   Date/Time Value Ref Range Status   11/29/2023 09:37 AM 90 80 - 100 fL Final   01/06/2020 07:40 AM 91 80 - 100 fL Final   01/05/2020 10:28 AM 91 80 - 100 fL Final   10/17/2019 03:57 PM 97 80 - 100 fL Final     Platelets   Date/Time Value Ref Range Status   11/29/2023 09:37  150 - 450 x10*3/uL Final   02/27/2020 10:01  150 - 450 x10E9/L Final   01/06/2020 07:40  150 - 450 x10E9/L Final   01/05/2020 10:28  150 - 450 x10E9/L Final     Neutrophils Absolute   Date/Time Value Ref Range Status   01/05/2020 10:28 AM 6.56 1.20 - 7.70 x10E9/L Final     No components found for: \"TXPLABS\"      No images are attached to the encounter.    Assessment/Plan   No matching staging information was found for the patient.  Diagnoses and all orders for this visit:  Adenocarcinoma of right lung (CMS/HCC)  -     Referral to Malignant Hematology (Hematology / Oncology)    Mr. Tao Renee is a 73 y.o. male with possible regional recurrence to 4R of previously radiated RUL adenocarcinoma     Patient's oncologic history documented above but briefly he has had 2 prior diagnosis of well differentiated adenocarcinoma, initially of RML in 2016 s/p lobectomy by Dr. Bronson and second in 2020 of RUL treated with hypofractionated RT by Dr. Salinas. He has been followed in surveillance by Dr. Bronson with RUL nodule showing subtle increase in size as well as superior segmental mass increasing from 1.5 to 1.8 cm over 6 months. He was referred to IP and underwent bronchoscopy on 12/8/23. Pathology from RUL FNA showed rare atypical cells, 4R showed a well differentiated adenocarcinoma. Station 7, 11RS and bronchial washings were all nondiagnostic.     We discussed next steps " including surveillance, RUL lobectomy, and reirradiation including the challenges with each approach. His PFTs from 2020 are excellent and if a surgical candidate he would consider curative lobectomy. Alternatively it would be reasonable to survey as this 4R node was 7 mm on bronchoscopy, not PET avid, and found incidentally as it was not reason for bronch.    We will add this patient to tumor board on 12/22 for pathology review and to ascertain surgical status or other treatment options.  We will obtain PFTs in next few weeks and follow on 12/27 remotely for follow up discussion. All questions answered.    This patient was seen and discussed with Dr. Blane Lawler MD     I saw and evaluated the patient. I personally obtained the key and critical portions of the history and physical exam or was physically present for key and critical portions performed by the resident/fellow. I reviewed the resident/fellow's documentation and discussed the patient with the resident/fellow. I agree with the resident/fellow's medical decision making as documented in the note.

## 2023-12-22 ENCOUNTER — TUMOR BOARD CONFERENCE (OUTPATIENT)
Dept: HEMATOLOGY/ONCOLOGY | Facility: HOSPITAL | Age: 73
End: 2023-12-22
Payer: MEDICARE

## 2023-12-22 NOTE — TUMOR BOARD NOTE
Patient Name: Tao Renee  MRN: [id number]  Physician: Blane/Aiyana    Patient with 2 instances of well differentiated adenocarcinoma, initially of RML ([id number]) in 2016 s/p lobectomy by Dr. Bronson and second in 2020 of RUL treated with hypofractionated RT by Dr. Salinas due to close proximity to trachea/esophagus. Recent recurrence with pathology from RUL FNA showed rare atypical cells, 4R showed a well differentiated adenocarcinoma. Station 7, 11RS and bronchial washings were all nondiagnostic.     Discussion: Imaging/pathology reviewed. There is concern for RLL lesion as well as RUL lesion. Patient would be offered RUL lobectomy +/- RLL wedge. Recurrence is too high risk for re-irradiation. If he declines surgery would be appropriate for close surveillance.    Recommendations: Referral to Dr. Bronson for surgical discussion. If patient wishes to proceed with surgery will follow with Dr. Arguelles for neoadjuvant therapy. If patient declines surgery will continue close surveillance.

## 2023-12-27 ENCOUNTER — OFFICE VISIT (OUTPATIENT)
Dept: HEMATOLOGY/ONCOLOGY | Facility: CLINIC | Age: 73
End: 2023-12-27
Payer: MEDICARE

## 2023-12-27 ENCOUNTER — OFFICE VISIT (OUTPATIENT)
Dept: SURGERY | Facility: CLINIC | Age: 73
End: 2023-12-27
Payer: MEDICARE

## 2023-12-27 VITALS
WEIGHT: 245 LBS | TEMPERATURE: 97.3 F | BODY MASS INDEX: 30.46 KG/M2 | DIASTOLIC BLOOD PRESSURE: 80 MMHG | HEIGHT: 75 IN | OXYGEN SATURATION: 99 % | SYSTOLIC BLOOD PRESSURE: 143 MMHG | HEART RATE: 84 BPM | RESPIRATION RATE: 18 BRPM

## 2023-12-27 DIAGNOSIS — R91.8 PULMONARY NODULES/LESIONS, MULTIPLE: ICD-10-CM

## 2023-12-27 DIAGNOSIS — C34.91 ADENOCARCINOMA OF RIGHT LUNG (MULTI): Primary | ICD-10-CM

## 2023-12-27 DIAGNOSIS — C34.91 ADENOCARCINOMA OF RIGHT LUNG (MULTI): ICD-10-CM

## 2023-12-27 PROCEDURE — 1126F AMNT PAIN NOTED NONE PRSNT: CPT | Performed by: INTERNAL MEDICINE

## 2023-12-27 PROCEDURE — 1159F MED LIST DOCD IN RCRD: CPT | Performed by: INTERNAL MEDICINE

## 2023-12-27 PROCEDURE — 1159F MED LIST DOCD IN RCRD: CPT | Performed by: THORACIC SURGERY (CARDIOTHORACIC VASCULAR SURGERY)

## 2023-12-27 PROCEDURE — 1036F TOBACCO NON-USER: CPT | Performed by: INTERNAL MEDICINE

## 2023-12-27 PROCEDURE — 99215 OFFICE O/P EST HI 40 MIN: CPT | Mod: 57 | Performed by: THORACIC SURGERY (CARDIOTHORACIC VASCULAR SURGERY)

## 2023-12-27 PROCEDURE — 1036F TOBACCO NON-USER: CPT | Performed by: THORACIC SURGERY (CARDIOTHORACIC VASCULAR SURGERY)

## 2023-12-27 PROCEDURE — 99215 OFFICE O/P EST HI 40 MIN: CPT | Performed by: THORACIC SURGERY (CARDIOTHORACIC VASCULAR SURGERY)

## 2023-12-27 PROCEDURE — 99214 OFFICE O/P EST MOD 30 MIN: CPT | Performed by: INTERNAL MEDICINE

## 2023-12-27 PROCEDURE — 99214 OFFICE O/P EST MOD 30 MIN: CPT | Mod: 95 | Performed by: INTERNAL MEDICINE

## 2023-12-27 PROCEDURE — 3079F DIAST BP 80-89 MM HG: CPT | Performed by: THORACIC SURGERY (CARDIOTHORACIC VASCULAR SURGERY)

## 2023-12-27 PROCEDURE — 3077F SYST BP >= 140 MM HG: CPT | Performed by: THORACIC SURGERY (CARDIOTHORACIC VASCULAR SURGERY)

## 2023-12-27 PROCEDURE — 1126F AMNT PAIN NOTED NONE PRSNT: CPT | Performed by: THORACIC SURGERY (CARDIOTHORACIC VASCULAR SURGERY)

## 2023-12-27 RX ORDER — HEPARIN SODIUM 5000 [USP'U]/ML
5000 INJECTION, SOLUTION INTRAVENOUS; SUBCUTANEOUS ONCE
Status: CANCELLED | OUTPATIENT
Start: 2023-12-27 | End: 2023-12-27

## 2023-12-27 RX ORDER — GABAPENTIN 100 MG/1
300 CAPSULE ORAL ONCE
Status: CANCELLED | OUTPATIENT
Start: 2023-12-27 | End: 2023-12-27

## 2023-12-27 RX ORDER — ACETAMINOPHEN 325 MG/1
650 TABLET ORAL ONCE
Status: CANCELLED | OUTPATIENT
Start: 2023-12-27 | End: 2023-12-27

## 2023-12-27 ASSESSMENT — ENCOUNTER SYMPTOMS
LOSS OF SENSATION IN FEET: 1
DEPRESSION: 0
OCCASIONAL FEELINGS OF UNSTEADINESS: 0

## 2023-12-27 ASSESSMENT — PAIN SCALES - GENERAL: PAINLEVEL: 0-NO PAIN

## 2023-12-27 NOTE — PROGRESS NOTES
"Subjective   Tao Renee  is a 73 y.o. male who presents for treatment discussion.  Navigational bronchoscopy with EBUS completed on 12/8/23.    Briefly, this is a 72 male who presented to me with a pulmonary nodule. He underwent percutaneous biopsy and was found to have lung cancer. I recommended the patient undergo thoracoscopic lobectomy. This was performed on June 9, 2016 at [facility]. The patient had no intraoperative or postoperative complications.      In October 2019, I was concerned about a right-sided lung nodule which had changed in characteristic. I recommended a percutaneous lung biopsy, which was performed on 10/23/19. This was a difficult procedure and the results suggested only \"atypical\" findings. Subsequent imaging continued to be abnormal, and I recommended repeat percutaneous biopsy, and this was confirmed to be a new lipidic well-differentiated invasive adenocarcinoma. PET/CT 3/5/2020 suggested isolated cancer, and he was referred for ablative radiation therapy given his aversion to repeat surgery. The patient was treated with hypofractionated radiation from 4/21/2020 to 5/11/2020.    Currently the patient is in their usual state of health. They deny the following symptoms: shortness of breath at rest, shortness of breath with activity, cough, hemoptysis, fevers, chills, and weight loss.      There have been no significant changes to their documented medical, surgical and family history.     He  reports that he quit smoking about 23 years ago. His smoking use included cigarettes. He started smoking about 57 years ago. He has a 68.00 pack-year smoking history. He has never used smokeless tobacco. He reports current alcohol use of about 6.0 standard drinks of alcohol per week. He reports that he does not currently use drugs.    Objective   Physical Exam  The patient is well-appearing and in no acute distress. The trachea is midline and there is no crepitus. The lungs were clear to " auscultation grossly. There was good effort and excursion. The heart had a regular rate and rhythm. The abdomen was soft, nontender and nondistended. The extremities had no edema or gross deformities. Mood and affect are appropriate.  Diagnostic Studies  I discussed this patient at tumor board and with Dr. Arguelles  I have reviewed a biopsy which shows cancer and level 4 lymph node    Assessment/Plan   Overall, I believe that the patient  has recurrent right upper lobe cancer, likely with regional lymph node metastasis .     Given the previous treatment with radiation, the patient has relatively few treatments that could be offered with curative intent.  Given his current performance status, I believe the patient is a reasonable patient to consider surgical resection.  Surgical resection should include right upper lobe lobectomy and mediastinal lymph node dissection.  He also has a concerning right lower lobe nodule, which could be removed with red resection simultaneously.    Given this patient's previous procedure and history of radiation, this is a higher risk operation.  I discussed the risks, benefits, and alternatives to surgery with the patient in detail.  He was amenable to proceeding with surgery.  Prior to surgery, he should receive pulmonary function testing to confirm appropriate respiratory reserve and for further risk stratification.  I believe the patient could be scheduled for surgery on or about January 22, 2024 at [facility]    I recommend surgery.  Bronchoscopy, thoracoscopic/robotic right upper lobe lobectomy, mediastinal lymph node dissection, right lower lobe wedge, possible thoracotomy [facility] . He also needs cardiac clearance    I discussed this in detail with the patient, including a discussion of alternatives. They were comfortable with this approach.     Stephan Bronson MD  [phone]

## 2023-12-27 NOTE — H&P (VIEW-ONLY)
"Subjective   Tao Renee  is a 73 y.o. male who presents for treatment discussion.  Navigational bronchoscopy with EBUS completed on 12/8/23.    Briefly, this is a 72 male who presented to me with a pulmonary nodule. He underwent percutaneous biopsy and was found to have lung cancer. I recommended the patient undergo thoracoscopic lobectomy. This was performed on June 9, 2016 at [facility]. The patient had no intraoperative or postoperative complications.      In October 2019, I was concerned about a right-sided lung nodule which had changed in characteristic. I recommended a percutaneous lung biopsy, which was performed on 10/23/19. This was a difficult procedure and the results suggested only \"atypical\" findings. Subsequent imaging continued to be abnormal, and I recommended repeat percutaneous biopsy, and this was confirmed to be a new lipidic well-differentiated invasive adenocarcinoma. PET/CT 3/5/2020 suggested isolated cancer, and he was referred for ablative radiation therapy given his aversion to repeat surgery. The patient was treated with hypofractionated radiation from 4/21/2020 to 5/11/2020.    Currently the patient is in their usual state of health. They deny the following symptoms: shortness of breath at rest, shortness of breath with activity, cough, hemoptysis, fevers, chills, and weight loss.      There have been no significant changes to their documented medical, surgical and family history.     He  reports that he quit smoking about 23 years ago. His smoking use included cigarettes. He started smoking about 57 years ago. He has a 68.00 pack-year smoking history. He has never used smokeless tobacco. He reports current alcohol use of about 6.0 standard drinks of alcohol per week. He reports that he does not currently use drugs.    Objective   Physical Exam  The patient is well-appearing and in no acute distress. The trachea is midline and there is no crepitus. The lungs were clear to " auscultation grossly. There was good effort and excursion. The heart had a regular rate and rhythm. The abdomen was soft, nontender and nondistended. The extremities had no edema or gross deformities. Mood and affect are appropriate.  Diagnostic Studies  I discussed this patient at tumor board and with Dr. Arguelles  I have reviewed a biopsy which shows cancer and level 4 lymph node    Assessment/Plan   Overall, I believe that the patient  has recurrent right upper lobe cancer, likely with regional lymph node metastasis .     Given the previous treatment with radiation, the patient has relatively few treatments that could be offered with curative intent.  Given his current performance status, I believe the patient is a reasonable patient to consider surgical resection.  Surgical resection should include right upper lobe lobectomy and mediastinal lymph node dissection.  He also has a concerning right lower lobe nodule, which could be removed with red resection simultaneously.    Given this patient's previous procedure and history of radiation, this is a higher risk operation.  I discussed the risks, benefits, and alternatives to surgery with the patient in detail.  He was amenable to proceeding with surgery.  Prior to surgery, he should receive pulmonary function testing to confirm appropriate respiratory reserve and for further risk stratification.  I believe the patient could be scheduled for surgery on or about January 22, 2024 at [facility]    I recommend surgery.  Bronchoscopy, thoracoscopic/robotic right upper lobe lobectomy, mediastinal lymph node dissection, right lower lobe wedge, possible thoracotomy [facility] . He also needs cardiac clearance    I discussed this in detail with the patient, including a discussion of alternatives. They were comfortable with this approach.     Stephan Bronson MD  [phone]

## 2023-12-27 NOTE — PROGRESS NOTES
"Patient ID: Tao Renee is a 73 y.o. 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       SITES OF DISEASE  4R  Possibly RUL - TBBx with \"atypical cells\"     MOLECULAR GENOMICS  From 02/27/2020 biopsy of RUL:     PD-L1 TPS 10%     MICROSATELLITE STATUS: Microsatellite Stable (NAYA)        DISEASE ASSOCIATED GENOMIC FINDINGS:  CDKN2A p.S361Wit*8 ([id number]: c.332dupG)  CTNNB1 p.S37F ([id number]: c.110C>T), subclonal  EGFR p.G719D ([id number]: c.2156G>A)  EGFR p.S768I ([id number]: c.2303G>T)       INTERPRETATION AND POTENTIAL THERAPEUTIC OPTIONS:  EGFR p.G719D and p.S768I   FDA RECOMMENDATIONS: Afatinib (NSCLC)        DISEASE RELEVANT ALTERATIONS NOT DETECTED:  Negative for ALK fusion.  Negative for BRAF V600E.  Negative for NTRK fusion.  Negative for ROS1 fusion.     PRIOR THERAPIES  2016: RML lobectomy for [id number] well differentiated adenocarcinoma  2020: Hypofractionated RT (60 Gy over 15f) to the RUL due to close proximity to esophagus/trachea     CURRENT THERAPY  TBD    CURRENT ONCOLOGICAL PROBLEMS  None      HISTORY OF PRESENT ILLNESS  This is a former 60 pack-year smoker (quit 2001) who underwent a RML lobectomy on 06/09/2016 for pT1bN0 well differentiated adenocarcinoma. In 2020 he developed a new RUL nodule that was biopsied and found to be a new lepidic well differentiated invasive adenocarcinoma. A PET scan obtained on 03/05/20 showed no evidence of extra-thoracic disease and he was treated with hypofractionated radiation from 4/21/2020 to 5/11/2020.     He continued to be followed by his surgeon, Dr. Stephan Bronson. Most recently he noted a change in the previously treated RUL adenocarcinoma with RT and a bronchoscopy was performed on 12/08/23:  Final Cytological Interpretation   A. LUNG FINE NEEDLE ASPIRATION RIGHT LOWER LOBE - RLL nodule                Nondiagnostic specimen due to insufficient cellularity        "         Blood and bronchial cells.  B. BRONCHIAL BRUSH RIGHT LOWER LOBE                 Nondiagnostic specimen due to insufficient cellularity                Blood and bronchial cells.  C. LYMPH NODE 4 R PULMONARY FINE NEEDLE ASPIRATION                 Malignant cells derived from well differentiated adenocarcinoma  Molecular testing has been ordered and results will be issued in a separate report.  The cell block contains high tumor cellularity representing roughly 30% of all nucleated cells.   D. LYMPH NODE 7 PULMONARY FINE NEEDLE ASPIRATION                 No malignant cells identified                Lymphoid sample  E. LYMPH NODE 11 Rs PULMONARY FINE NEEDLE ASPIRATION                 No malignant cells identified                Lymphoid sample  F. LUNG FINE NEEDLE ASPIRATION RIGHT UPPER LOBE - RUL Nodule                Rare cluster of atypical cells, favor squamous metaplasia.                Lymphoid sample                     Case was presented at TB on 12/23/23 and surgical resection recommended with systemic therapy.      PAST MEDICAL HISTORY  Diverticulitis     SURGICAL HISTORY  RML lobectomy in 2016      SOCIAL HISTORY  Smoked 2 PPD x30 years, quit in 2001.   Drinks 2-3 nights a week. No illicit drug use.   Born in [place], now lives in [place]. Worked in a steel mill, retired in 2001. Worked at a [employer] and works at [employer]. Lives alone, 2 brothers and 1 sisters all local. No kids. Pentec[employer]al.      FAMILY HISTORY  No history of cancer     CURRENT MEDS REVIEWED       ALLERGIES REVIEWED        SUBJECTIVE:  Verbal consent obtained for this telehealth visit   Called to discussed TB recs   Golfed yesterday   No new sx  Sees Dr. Bronson later today at 11:30 am     A 13 point review of systems was performed, with significant findings documented above in subjective history.    OBJECTIVE:  There were no vitals filed for this visit.   There is no height or weight on file to  "calculate BSA.     Wt Readings from Last 5 Encounters:   12/14/23 111 kg (245 lb 2.4 oz)   12/08/23 111 kg (245 lb)   11/28/23 112 kg (246 lb)   10/11/23 112 kg (246 lb 3.2 oz)   04/12/23 112 kg (247 lb 2 oz)       ECOGSCORE: 0- Fully active, able to carry on all pre-disease performance w/o restriction.    Diagnostic Results   Results:  Labs:  Lab Results   Component Value Date    WBC 8.4 11/29/2023    HGB 16.3 11/29/2023    HCT 51.5 11/29/2023    MCV 90 11/29/2023     11/29/2023      Lab Results   Component Value Date    NEUTROABS 6.56 01/05/2020        Lab Results   Component Value Date    GLUCOSE 85 11/29/2023    CALCIUM 9.1 11/29/2023     11/29/2023    K 4.6 11/29/2023    CO2 27 11/29/2023     11/29/2023    BUN 14 11/29/2023    CREATININE 1.01 11/29/2023     Lab Results   Component Value Date    ALT 13 11/29/2023    AST 10 11/29/2023    ALKPHOS 66 11/29/2023    BILITOT 0.7 11/29/2023    BILIDIR 0.0 11/04/2022      No results found for: \"ACTH\", \"CORTISOL\", \"TSH\", \"FREET4\"      No images are attached to the encounter or orders placed in the encounter.     Assessment/Plan   Mr. Tao Renee is a 73 y.o. male with possible regional recurrence to 4R of previously radiated RUL adenocarcinoma      Patient's oncologic history documented above but briefly he has had 2 prior diagnosis of well differentiated adenocarcinoma, initially of RML in 2016 s/p lobectomy by Dr. Bronson and second in 2020 of RUL treated with hypofractionated RT by Dr. Salinas. He has been followed in surveillance by Dr. Bronson with RUL nodule showing subtle increase in size as well as superior segmental mass increasing from 1.5 to 1.8 cm over 6 months. He was referred to IP and underwent bronchoscopy on 12/8/23. Pathology from RUL FNA showed rare atypical cells, 4R showed a well differentiated adenocarcinoma. Station 7, 11RS and bronchial washings were all nondiagnostic.     We discussed TB recs and he is to see Dr. Bronson and go over " possible surgical options   If agrees with resection, can be treated in both neoadjuvant or adjuvant fashion  With history of EGFR mutation (non-classic) in last RUL adenocarcinoma (and this is likely a recurrence from that tumor) the use of checkpoint inhibitor therapy in neoadjuvant fashion is not recommended (we may not have enough specimen in 4R to confirm or rule out) -   If this cannot be done prior to surgery, I favor proceeding with resection first   He is fit and should recover in time for adjuvant chemotherapy to commence within 2 months after surgery   He also has minimal disease - 4R is not pathologically enlarged and was not PET avid   He is not a candidate for another course of radiotherapy to that location   If elects against surgery will be followed closely radiographicaly   If/when has measurable disease, initiation of systemic therapy would be warranted for survival prolongation and maintenance of QoL. Would not however be treated with curative intent   He is now asymptomatic -       This note was created with the assistance of a speech recognition program.  Although the intention is to generate a document that actually reflects the content of the visit, it is possible that some mistakes occur and may not be corrected by the time of completion of this note.        Argenis Arguelles MD, MS  Thoracic Medical Oncology   [address] [place], [place] [id number]  Phone: [phone]

## 2023-12-28 ENCOUNTER — HOSPITAL ENCOUNTER (OUTPATIENT)
Dept: RESPIRATORY THERAPY | Facility: HOSPITAL | Age: 73
Discharge: HOME | End: 2023-12-28
Payer: MEDICARE

## 2023-12-28 DIAGNOSIS — C34.91 ADENOCARCINOMA OF RIGHT LUNG (MULTI): ICD-10-CM

## 2023-12-28 LAB
MGC ASCENT PFT - FEV1 - POST: 3.29
MGC ASCENT PFT - FEV1 - PRE: 2.81
MGC ASCENT PFT - FEV1 - PREDICTED: 3.48
MGC ASCENT PFT - FVC - POST: 5.66
MGC ASCENT PFT - FVC - PRE: 5.21
MGC ASCENT PFT - FVC - PREDICTED: 4.75

## 2023-12-28 PROCEDURE — 94060 EVALUATION OF WHEEZING: CPT | Performed by: STUDENT IN AN ORGANIZED HEALTH CARE EDUCATION/TRAINING PROGRAM

## 2023-12-28 PROCEDURE — 94726 PLETHYSMOGRAPHY LUNG VOLUMES: CPT

## 2023-12-29 PROBLEM — E78.5 HYPERLIPIDEMIA: Chronic | Status: ACTIVE | Noted: 2023-09-06

## 2023-12-29 PROBLEM — K57.92 DIVERTICULITIS: Status: ACTIVE | Noted: 2023-09-06

## 2023-12-29 PROBLEM — I71.20 THORACIC AORTIC ANEURYSM (TAA) (CMS-HCC): Chronic | Status: ACTIVE | Noted: 2023-09-06

## 2023-12-29 PROBLEM — I20.9 CARDIAC ANGINA (CMS-HCC): Status: ACTIVE | Noted: 2023-09-06

## 2023-12-29 PROBLEM — I25.10 ARTERIOSCLEROSIS OF CORONARY ARTERY: Chronic | Status: ACTIVE | Noted: 2023-09-06

## 2023-12-29 PROBLEM — R10.9 ABDOMINAL PAIN: Status: ACTIVE | Noted: 2023-12-29

## 2023-12-29 PROBLEM — J40 BRONCHITIS: Status: ACTIVE | Noted: 2023-12-29

## 2023-12-29 PROBLEM — N40.0 BPH (BENIGN PROSTATIC HYPERPLASIA): Status: ACTIVE | Noted: 2017-02-02

## 2023-12-29 PROBLEM — F41.9 ANXIETY: Status: ACTIVE | Noted: 2023-12-29

## 2024-01-02 ENCOUNTER — APPOINTMENT (OUTPATIENT)
Dept: PREADMISSION TESTING | Facility: HOSPITAL | Age: 74
End: 2024-01-02
Payer: MEDICARE

## 2024-01-03 ENCOUNTER — TELEMEDICINE CLINICAL SUPPORT (OUTPATIENT)
Dept: PREADMISSION TESTING | Facility: HOSPITAL | Age: 74
End: 2024-01-03
Payer: MEDICARE

## 2024-01-04 ENCOUNTER — APPOINTMENT (OUTPATIENT)
Dept: PRIMARY CARE | Facility: CLINIC | Age: 74
End: 2024-01-04
Payer: MEDICARE

## 2024-01-04 ENCOUNTER — TELEPHONE (OUTPATIENT)
Dept: PRIMARY CARE | Facility: CLINIC | Age: 74
End: 2024-01-04

## 2024-01-04 NOTE — TELEPHONE ENCOUNTER
Has pink eye went to urgent care on Sunday they gave him polymixin eye drops and ofloxacin r eye is getting worse and spread to other eye.

## 2024-01-05 ENCOUNTER — APPOINTMENT (OUTPATIENT)
Dept: PREADMISSION TESTING | Facility: HOSPITAL | Age: 74
End: 2024-01-05
Payer: MEDICARE

## 2024-01-09 ENCOUNTER — PRE-ADMISSION TESTING (OUTPATIENT)
Dept: PREADMISSION TESTING | Facility: HOSPITAL | Age: 74
End: 2024-01-09
Payer: MEDICARE

## 2024-01-09 VITALS
TEMPERATURE: 97.2 F | DIASTOLIC BLOOD PRESSURE: 75 MMHG | BODY MASS INDEX: 30.21 KG/M2 | SYSTOLIC BLOOD PRESSURE: 124 MMHG | HEIGHT: 75 IN | WEIGHT: 243 LBS | HEART RATE: 101 BPM | RESPIRATION RATE: 18 BRPM

## 2024-01-09 DIAGNOSIS — C34.91 ADENOCARCINOMA OF RIGHT LUNG (MULTI): Primary | ICD-10-CM

## 2024-01-09 LAB
ABO GROUP (TYPE) IN BLOOD: NORMAL
ANION GAP SERPL CALC-SCNC: 13 MMOL/L (ref 10–20)
ANTIBODY SCREEN: NORMAL
APTT PPP: 30 SECONDS (ref 27–38)
BUN SERPL-MCNC: 13 MG/DL (ref 6–23)
CALCIUM SERPL-MCNC: 9.5 MG/DL (ref 8.6–10.6)
CHLORIDE SERPL-SCNC: 105 MMOL/L (ref 98–107)
CO2 SERPL-SCNC: 25 MMOL/L (ref 21–32)
CREAT SERPL-MCNC: 1.11 MG/DL (ref 0.5–1.3)
EGFRCR SERPLBLD CKD-EPI 2021: 70 ML/MIN/1.73M*2
ERYTHROCYTE [DISTWIDTH] IN BLOOD BY AUTOMATED COUNT: 13.5 % (ref 11.5–14.5)
EST. AVERAGE GLUCOSE BLD GHB EST-MCNC: 114 MG/DL
GLUCOSE SERPL-MCNC: 93 MG/DL (ref 74–99)
HBA1C MFR BLD: 5.6 %
HCT VFR BLD AUTO: 51.3 % (ref 41–52)
HGB BLD-MCNC: 16.7 G/DL (ref 13.5–17.5)
INR PPP: 1.1 (ref 0.9–1.1)
MCH RBC QN AUTO: 28.7 PG (ref 26–34)
MCHC RBC AUTO-ENTMCNC: 32.6 G/DL (ref 32–36)
MCV RBC AUTO: 88 FL (ref 80–100)
NRBC BLD-RTO: 0 /100 WBCS (ref 0–0)
PLATELET # BLD AUTO: 249 X10*3/UL (ref 150–450)
POTASSIUM SERPL-SCNC: 5.3 MMOL/L (ref 3.5–5.3)
PROTHROMBIN TIME: 11.9 SECONDS (ref 9.8–12.8)
RBC # BLD AUTO: 5.81 X10*6/UL (ref 4.5–5.9)
RH FACTOR (ANTIGEN D): NORMAL
SODIUM SERPL-SCNC: 138 MMOL/L (ref 136–145)
WBC # BLD AUTO: 9.2 X10*3/UL (ref 4.4–11.3)

## 2024-01-09 PROCEDURE — 87081 CULTURE SCREEN ONLY: CPT | Performed by: NURSE PRACTITIONER

## 2024-01-09 PROCEDURE — 83036 HEMOGLOBIN GLYCOSYLATED A1C: CPT | Performed by: NURSE PRACTITIONER

## 2024-01-09 PROCEDURE — 85027 COMPLETE CBC AUTOMATED: CPT | Performed by: THORACIC SURGERY (CARDIOTHORACIC VASCULAR SURGERY)

## 2024-01-09 PROCEDURE — 36415 COLL VENOUS BLD VENIPUNCTURE: CPT

## 2024-01-09 PROCEDURE — 80048 BASIC METABOLIC PNL TOTAL CA: CPT | Performed by: THORACIC SURGERY (CARDIOTHORACIC VASCULAR SURGERY)

## 2024-01-09 PROCEDURE — 86901 BLOOD TYPING SEROLOGIC RH(D): CPT | Performed by: THORACIC SURGERY (CARDIOTHORACIC VASCULAR SURGERY)

## 2024-01-09 PROCEDURE — 99204 OFFICE O/P NEW MOD 45 MIN: CPT | Performed by: NURSE PRACTITIONER

## 2024-01-09 PROCEDURE — 85610 PROTHROMBIN TIME: CPT | Performed by: NURSE PRACTITIONER

## 2024-01-09 RX ORDER — CHLORHEXIDINE GLUCONATE ORAL RINSE 1.2 MG/ML
15 SOLUTION DENTAL AS NEEDED
Qty: 473 ML | Refills: 0 | Status: SHIPPED | OUTPATIENT
Start: 2024-01-09 | End: 2024-01-11

## 2024-01-09 RX ORDER — CHLORHEXIDINE GLUCONATE 40 MG/ML
SOLUTION TOPICAL DAILY PRN
Qty: 473 ML | Refills: 0 | Status: SHIPPED | OUTPATIENT
Start: 2024-01-09 | End: 2024-01-14

## 2024-01-09 ASSESSMENT — ENCOUNTER SYMPTOMS
CONSTITUTIONAL NEGATIVE: 1
CARDIOVASCULAR NEGATIVE: 1
MUSCULOSKELETAL NEGATIVE: 1
NEUROLOGICAL NEGATIVE: 1
GASTROINTESTINAL NEGATIVE: 1
RESPIRATORY NEGATIVE: 1
NECK NEGATIVE: 1
ENDOCRINE NEGATIVE: 1

## 2024-01-09 ASSESSMENT — DUKE ACTIVITY SCORE INDEX (DASI)
CAN YOU PARTICIPATE IN MODERATE RECREATIONAL ACTIVITIES LIKE GOLF, BOWLING, DANCING, DOUBLES TENNIS OR THROWING A BASEBALL OR FOOTBALL: YES
TOTAL_SCORE: 58.2
DASI METS SCORE: 9.9
CAN YOU DO HEAVY WORK AROUND THE HOUSE LIKE SCRUBBING FLOORS OR LIFTING AND MOVING HEAVY FURNITURE: YES
CAN YOU DO MODERATE WORK AROUND THE HOUSE LIKE VACUUMING, SWEEPING FLOORS OR CARRYING GROCERIES: YES
CAN YOU WALK INDOORS, SUCH AS AROUND YOUR HOUSE: YES
CAN YOU CLIMB A FLIGHT OF STAIRS OR WALK UP A HILL: YES
CAN YOU PARTICIPATE IN STRENOUS SPORTS LIKE SWIMMING, SINGLES TENNIS, FOOTBALL, BASKETBALL, OR SKIING: YES
CAN YOU TAKE CARE OF YOURSELF (EAT, DRESS, BATHE, OR USE TOILET): YES
CAN YOU WALK A BLOCK OR TWO ON LEVEL GROUND: YES
CAN YOU RUN A SHORT DISTANCE: YES
CAN YOU HAVE SEXUAL RELATIONS: YES
CAN YOU DO LIGHT WORK AROUND THE HOUSE LIKE DUSTING OR WASHING DISHES: YES
CAN YOU DO YARD WORK LIKE RAKING LEAVES, WEEDING OR PUSHING A MOWER: YES

## 2024-01-09 ASSESSMENT — LIFESTYLE VARIABLES: SMOKING_STATUS: NONSMOKER

## 2024-01-09 NOTE — CPM/PAT H&P
CPM/PAT Evaluation       Name: Tao Renee (Tao Renee)  /Age: 1950/73 y.o.     Visit Type:   In-Person       Chief Complaint: Adenocarcinoma of right lung    HPI  Pt is a 73 year old male with a PMHx significant for  right middle lobe lung cancer s/p thoracoscopic resection in  and right upper lobe lung cancer s/p radiation treatment in . Pt was found to have recurrence of right-sided upper lobe cancer.  Pt is being evaluated in CPM in anticipation of a Thoracoscopic Lung Wedge Resection-Robot Assisted Bronchoscopy, Thoracoscopic/ Robotic right Upper lobe lobectomy, Mediastinal lymph node dissection, right lower lobe wedge, possible thoracotomy with Dr. Bronson on 24.  Past Medical History:   Diagnosis Date    Arteriosclerosis of coronary artery 2023    Elevated calcium score 18 Agatston units.(25th percentile)    Arthritis     BPH (benign prostatic hyperplasia)     GERD (gastroesophageal reflux disease)     H/O echocardiogram     Last Echo in     HL (hearing loss)     Hyperlipidemia 2023    Dr. Chou follows    Lung cancer (CMS/HCC)     recurrent lung cancer    Sleep apnea     Thoracic aortic aneurysm (TAA) (CMS/HCC) 2023    3.8 - 4 cm       Past Surgical History:   Procedure Laterality Date    BRONCHOSCOPY      COLONOSCOPY  10/14/2015    Colonoscopy (Fiberoptic)    CT ABDOMEN PELVIS ANGIOGRAM W AND/OR WO IV CONTRAST  2020    CT ABDOMEN PELVIS ANGIOGRAM W AND/OR WO IV CONTRAST 2020 [facility] CLINICAL LEGACY    CT GUIDED PERCUTANEOUS BIOPSY LUNG  2016    CT GUIDED PERCUTANEOUS BIOPSY LUNG 2016 [facility] [place] [facility]    CT GUIDED PERCUTANEOUS BIOPSY LUNG  10/23/2019    CT GUIDED PERCUTANEOUS BIOPSY LUNG 10/23/2019 [facility] [place] [facility]    CT GUIDED PERCUTANEOUS BIOPSY LUNG  2020    CT GUIDED PERCUTANEOUS BIOPSY LUNG 2020 [facility] LE[facility]    LUNG LOBECTOMY Right     OTHER SURGICAL HISTORY  2016    Thoracoscopy (Therapeutic) W/ Lobectomy    SMALL INTESTINE  SURGERY  10/14/2015    Small Bowel Resection       Patient Sexual activity questions deferred to the physician.    Family History   Problem Relation Name Age of Onset    Hypertension Mother      Coronary artery disease Mother      Hypertension Father      Coronary artery disease Father      Heart attack Father         Allergies   Allergen Reactions    Atorvastatin Itching    Azithromycin Diarrhea       Prior to Admission medications    Medication Sig Start Date End Date Taking? Authorizing Provider   ALPRAZolam (Xanax) 0.5 mg tablet Take 1 tablet (0.5 mg) by mouth as needed at bedtime for anxiety. 11/28/23  Yes Rubio Tariq,    cyanocobalamin (Vitamin B-12) 1,000 mcg tablet Take 1 tablet (1,000 mcg) by mouth once daily.   Yes Historical Provider, MD   fluticasone (Flonase Allergy Relief) 50 mcg/actuation nasal spray Administer 1 spray into each nostril once daily as needed.   Yes Historical Provider, MD   lisinopril 2.5 mg tablet Take 1 tablet (2.5 mg) by mouth once daily. 11/28/23  Yes Rubio Tariq,    NON FORMULARY Organic bitter apricot kernels 3-4 a day   Yes Historical Provider, MD   pantoprazole (ProtoNix) 40 mg EC tablet Take 1 tablet (40 mg) by mouth once daily as needed. In the morning 9/22/16  Yes Historical Provider, MD   pyridoxine HCl, vitamin B6, (VITAMIN B-6 ORAL) Take 100 mg by mouth.   Yes Historical Provider, MD   sildenafil (Viagra) 25 mg tablet Take 1 tablet (25 mg) by mouth. 1 hour before needed   Yes Historical Provider, MD   sucralfate (Carafate) 1 gram tablet Take 1 tablet (1 g) by mouth if needed.   Yes Historical Provider, MD   thiamine 100 mg tablet Take by mouth.   Yes Historical Provider, MD   topiramate (Topamax) 25 mg tablet Take 0.5 tablets (12.5 mg) by mouth once daily. 11/28/23  Yes Rubio Tariq DO   simvastatin (Zocor) 20 mg tablet Take 1 tablet (20 mg) by mouth once daily at bedtime.  1/9/24  Historical Provider, MD SAMSON ROS:   Constitutional:   neg     Neuro/Psych:   neg    Eyes:    visual disturbance  Ears:    hearing loss   tinnitus  Nose:   neg    Mouth:   neg    Throat:   neg    Neck:   neg    Cardio:   neg    Respiratory:   neg    Endocrine:   neg    GI:   neg    :   neg    Musculoskeletal:   neg    Hematologic:   neg    Skin:  neg        Physical Exam  HENT:      Head: Normocephalic.      Nose: Nose normal.      Mouth/Throat:      Mouth: Mucous membranes are moist.   Eyes:      General:         Right eye: Discharge present.         Left eye: Discharge present.     Comments: Pt is on eye drops for [facility]   Cardiovascular:      Rate and Rhythm: Normal rate and regular rhythm.   Pulmonary:      Comments: NEWMAN  Abdominal:      Palpations: Abdomen is soft.   Musculoskeletal:         General: Normal range of motion.      Cervical back: Normal range of motion.   Skin:     General: Skin is warm and dry.   Neurological:      General: No focal deficit present.      Mental Status: He is alert and oriented to person, place, and time.   Psychiatric:         Mood and Affect: Mood normal.         Behavior: Behavior normal.          PAT AIRWAY:   Airway:     Mallampati::  II    TM distance::  >3 FB    Neck ROM::  Full  normal        Visit Vitals  /75   Pulse 101   Temp 36.2 °C (97.2 °F)   Resp 18       DASI Risk Score      Flowsheet Row Most Recent Value   DASI SCORE 58.2   METS Score (Will be calculated only when all the questions are answered) 9.9          Caprini DVT Assessment      Flowsheet Row Most Recent Value   DVT Score 10   Current Status Major surgery planned, lasting 2-3 hours, Present cancer or chemotherapy   History Previous malignancy   Age 60-75 years   BMI 30 or less          Modified Frailty Index      Flowsheet Row Most Recent Value   Modified Frailty Index Calculator .0909          CHADS2 Stroke Risk  Current as of just now        N/A 3 - 100%: High Risk   2 - 3%: Medium Risk   0 - 2%: Low Risk     Last Change: N/A          This score  determines the patient's risk of having a stroke if the patient has atrial fibrillation.        This score is not applicable to this patient. Components are not calculated.          Revised Cardiac Risk Index      Flowsheet Row Most Recent Value   Revised Cardiac Risk Calculator 1          Apfel Simplified Score      Flowsheet Row Most Recent Value   Apfel Simplified Score Calculator 2          Risk Analysis Index Results This Encounter    No data found in the last 1 encounters.       Stop Bang Score      Flowsheet Row Most Recent Value   Do you snore loudly? 1   Do you often feel tired or fatigued after your sleep? 1   Has anyone ever observed you stop breathing in your sleep? 1   Do you have or are you being treated for high blood pressure? 1   Recent BMI (Calculated) 30.4   Is BMI greater than 35 kg/m2? 0=No   Age older than 50 years old? 1=Yes   Is your neck circumference greater than 17 inches (Male) or 16 inches (Female)? 0   Gender - Male 1=Yes   STOP-BANG Total Score 6            Assessment and Plan:       Anesthesia:  The patient denies problems with anesthesia in the past such as PONV, prolonged sedation, awareness, dental damage, aspiration, cardiac arrest, difficult intubation, or unexpected hospital admissions.     Neuro:   The patient has no neurological diagnoses or significant findings on chart review, clinical presentation, and evaluation.  No grossly apparent perioperative risk. The patient is at increased risk for perioperative stroke secondary to hypertension , increased age, hyperlipidemia, general anesthesia, operative time >2.5 hours.    HEENT/Airway  No diagnoses, significant findings on chart review, clinical presentation, or evaluation.    Cardiovascular  The patient is scheduled for non-cardiac surgery associated with elevated risk.  The patient has no major cardiac contraindications to non- cardiac surgery.  RCRI  The patient meets 0-1 RCRI criteria and therefore has a less than 1% risk  of major adverse cardiac complications.  METS  The patient's functional capacity capacity is greater than 4 METS.  EKG  The patient has no EKG or echocardiographic changes concerning for myocardial ischemia.  No further cardiac evaluation is indicated  Heart Failure  The patient has no known history of heart failure.  Additionally, the patient reports no symptoms of heart failure and demonstrates no signs of heart failure.  Hypertension Evaluation  The patient has no known history of hypertension and has a normal blood pressure today.  Heart Rhythm Evaluation  The patient has no history of arrhythmias.  Heart Valve Evaluation  The patient has no known history of valvular heart disease. The patient has no symptoms or physical exam findings to suggest valvular heart disease.  CARDS EVAL  The patient follows with cardiology, Dr. Beasley. Patient was last seen 1-10-24. Per note, pt ok to proceed with surgery.  The patient has a 30-day risk for MACE of 1 predictor, 6.0% risk for cardiac death, nonfatal myocardial infarction, and nonfatal cardiac arrest.  NIKHIL score which indicates a 0.5% risk of intraoperative or 30-day postoperative.    Pulmonary   The patient has findings on chart review, clinical presentation and evaluation significant for SAURABH and SOB. The patient is at increased risk of perioperative pulmonary complications secondary to thoracic surgery, SAURABH, advanced age greater than 60.  The patient has a stop bang score of 6, which places patient at high risk for having SAURABH.  ARISCAT 50, High, 42.1% risk of in-hospital postoperative pulmonary complications  PRODIGY 25, high risk of respiratory depression episode.    Hematology  No diagnoses or significant findings on chart review or clinical presentation and evaluation.  Antiplatelet management   The patient is not currently receiving antiplatelet therapy.  Anticoagulation management  The patient is not currently receiving anticoagulation therapy.  Caprini score  10, high risk of perioperative VTE  Transfusion Evaluation  A type and screen was obtained given the likelihood for perioperative transfusion of blood or blood products.    Gastrointestinal  The patient has diagnoses or significant findings on chart review or clinical presentation and evaluation significant for GERD.  Eat 10- 0,  self-perceived oropharyngeal dysphagia scale (0-40)     Genitourinary  The patient has diagnoses or significant findings on chart review or clinical presentation and evaluation significant for BPH    Renal  The patient has no known history of chronic kidney disease. The patient has specific risk factors associated with increased risk of perioperative renal complications due to age greater than 55, male gender.    Musculoskeletal  The patient has diagnoses or significant findings on chart review or clinical presentation and evaluation significant for Osteoarthritis    Endocrine  Diabetes Evaluation  The patient has no history of diabetes mellitus  Thyroid Disease Evaluation  The patient has no history of thyroid disease.    ID  No diagnoses or significant findings on chart review or clinical presentation and evaluation.    -Preoperative medication instructions were provided and reviewed with the patient.  Any additional testing or evaluation was explained to the patient.  NPO Instructions were discussed, and the patient's questions were answered prior to conclusion of this encounter

## 2024-01-10 ENCOUNTER — OFFICE VISIT (OUTPATIENT)
Dept: CARDIOLOGY | Facility: CLINIC | Age: 74
End: 2024-01-10
Payer: MEDICARE

## 2024-01-10 VITALS
HEART RATE: 80 BPM | DIASTOLIC BLOOD PRESSURE: 80 MMHG | WEIGHT: 245 LBS | OXYGEN SATURATION: 96 % | BODY MASS INDEX: 30.46 KG/M2 | SYSTOLIC BLOOD PRESSURE: 142 MMHG | HEIGHT: 75 IN

## 2024-01-10 DIAGNOSIS — E78.49 OTHER HYPERLIPIDEMIA: Chronic | ICD-10-CM

## 2024-01-10 DIAGNOSIS — Z01.810 PREOPERATIVE CARDIOVASCULAR EXAMINATION: ICD-10-CM

## 2024-01-10 DIAGNOSIS — I20.9 CARDIAC ANGINA (CMS-HCC): ICD-10-CM

## 2024-01-10 DIAGNOSIS — I25.10 ARTERIOSCLEROSIS OF CORONARY ARTERY: Chronic | ICD-10-CM

## 2024-01-10 DIAGNOSIS — I10 PRIMARY HYPERTENSION: ICD-10-CM

## 2024-01-10 DIAGNOSIS — I71.20 THORACIC AORTIC ANEURYSM WITHOUT RUPTURE, UNSPECIFIED PART (CMS-HCC): Primary | Chronic | ICD-10-CM

## 2024-01-10 PROCEDURE — 3079F DIAST BP 80-89 MM HG: CPT | Performed by: STUDENT IN AN ORGANIZED HEALTH CARE EDUCATION/TRAINING PROGRAM

## 2024-01-10 PROCEDURE — 1126F AMNT PAIN NOTED NONE PRSNT: CPT | Performed by: STUDENT IN AN ORGANIZED HEALTH CARE EDUCATION/TRAINING PROGRAM

## 2024-01-10 PROCEDURE — 99204 OFFICE O/P NEW MOD 45 MIN: CPT | Performed by: STUDENT IN AN ORGANIZED HEALTH CARE EDUCATION/TRAINING PROGRAM

## 2024-01-10 PROCEDURE — 1159F MED LIST DOCD IN RCRD: CPT | Performed by: STUDENT IN AN ORGANIZED HEALTH CARE EDUCATION/TRAINING PROGRAM

## 2024-01-10 PROCEDURE — 93000 ELECTROCARDIOGRAM COMPLETE: CPT | Performed by: STUDENT IN AN ORGANIZED HEALTH CARE EDUCATION/TRAINING PROGRAM

## 2024-01-10 PROCEDURE — 1036F TOBACCO NON-USER: CPT | Performed by: STUDENT IN AN ORGANIZED HEALTH CARE EDUCATION/TRAINING PROGRAM

## 2024-01-10 PROCEDURE — 3077F SYST BP >= 140 MM HG: CPT | Performed by: STUDENT IN AN ORGANIZED HEALTH CARE EDUCATION/TRAINING PROGRAM

## 2024-01-10 ASSESSMENT — PAIN SCALES - GENERAL: PAINLEVEL: 0-NO PAIN

## 2024-01-10 NOTE — PROGRESS NOTES
Referred by Dr. Beasley for Needs cardiac clearance for lung surgery also EKG today     History Of Present Illness:    Tao Renee is a 73 y.o. male past med history notable for CAD based on a calcium score of 15, hyperlipidemia, thoracic aortic aneurysm measuring 3.9 cm, hypertension as well as history of right middle lobe cancer status post lobectomy.  Patient is here because he is undergoing evaluation for upper lobe lobectomy.  He is previously completed radiation therapy for cancer but now is going to undergo a lobectomy on 22 January.  Patient typically sees Dr. Chou in clinic.  Today he denies chest pain or shortness of breath.  He is able to go up a flight of stairs or do normal daily activities without symptoms.  He can cut the grass or shovel snow without symptoms of shortness of breath or chest discomfort.  He is functional at baseline.  Baseline EKG is notable for normal sinus rhythm with incomplete right bundle-branch block morphology.  He does not have history of diabetes or advanced renal disease.  No prior history of strokes.  He is previously on simvastatin but he discontinued taking this therapy as it made him itch.  He is very reluctant to initiate any additional statin therapy at this time.  Most recent lipid panel notable for total cholesterol 196, HDL of 50 LDL of 128 and triglycerides of 88.  Patient denies tobacco or heavy metal consumption.      Past Medical History:  He has a past medical history of Arteriosclerosis of coronary artery (09/06/2023), Arthritis, BPH (benign prostatic hyperplasia), GERD (gastroesophageal reflux disease), H/O echocardiogram, HL (hearing loss), Hyperlipidemia (09/06/2023), Lung cancer (CMS/[facility]), Shortness of breath, Sleep apnea, and Thoracic aortic aneurysm (TAA) (CMS/[facility]) (09/06/2023).    Past Surgical History:  He has a past surgical history that includes Colonoscopy (10/14/2015); Small intestine surgery (10/14/2015); Other surgical history (06/22/2016);  "CT guided percutaneous biopsy lung (05/04/2016); CT guided percutaneous biopsy lung (10/23/2019); CT angio abdomen pelvis w and or wo IV IV contrast (01/05/2020); CT guided percutaneous biopsy lung (02/27/2020); Lung lobectomy (Right); and Bronchoscopy.      Social History:  He reports that he quit smoking about 23 years ago. His smoking use included cigarettes. He started smoking about 57 years ago. He has a 68.00 pack-year smoking history. He has never used smokeless tobacco. He reports current alcohol use of about 6.0 standard drinks of alcohol per week. He reports that he does not use drugs.    Family History:  Family History   Problem Relation Name Age of Onset    Hypertension Mother      Coronary artery disease Mother      Hypertension Father      Coronary artery disease Father      Heart attack Father          Allergies:  Atorvastatin and Azithromycin    Outpatient Medications:  Current Outpatient Medications   Medication Instructions    ALPRAZolam (XANAX) 0.5 mg, oral, Nightly PRN    chlorhexidine (Hibiclens) 4 % external liquid Topical, Daily PRN    chlorhexidine (Peridex) 0.12 % solution 15 mL, Mouth/Throat, As needed    cyanocobalamin (VITAMIN B-12) 1,000 mcg, oral, Daily    fluticasone (Flonase Allergy Relief) 50 mcg/actuation nasal spray 1 spray, Each Nostril, Daily PRN    lisinopril 2.5 mg, oral, Daily    NON FORMULARY Organic bitter apricot kernels 3-4 a day     pantoprazole (PROTONIX) 40 mg, oral, Daily PRN, In the morning    pyridoxine HCl, vitamin B6, (VITAMIN B-6 ORAL) 100 mg, oral    sildenafil (VIAGRA) 25 mg, oral, 1 hour before needed    sucralfate (CARAFATE) 1 g, oral, As needed    thiamine 100 mg tablet oral    topiramate (TOPAMAX) 12.5 mg, oral, Daily        Last Recorded Vitals:  Vitals:    01/10/24 1328   BP: 142/80   BP Location: Right arm   Patient Position: Sitting   BP Cuff Size: Adult   Pulse: 80   SpO2: 96%   Weight: 111 kg (245 lb)   Height: 1.905 m (6' 3\")       Physical " Exam:  General: No acute distress,  A&O x3  Skin: Warm and dry  Neck: JVD is not elevated  ENT: Moist mucous membranes no lesions appreciated  Pulmonary: CTAB  Cards: Regular rate rhythm, no murmurs gallops or rubs appreciated normal S1-S2  Abdomen: Soft nontender nondistended  Extremities: No edema or cyanosis  Psych: Appropriate mood and affect          Last Labs:  CBC -  Lab Results   Component Value Date    WBC 9.2 01/09/2024    HGB 16.7 01/09/2024    HCT 51.3 01/09/2024    MCV 88 01/09/2024     01/09/2024       CMP -  Lab Results   Component Value Date    CALCIUM 9.5 01/09/2024    PROT 6.5 11/29/2023    ALBUMIN 3.8 11/29/2023    AST 10 11/29/2023    ALT 13 11/29/2023    ALKPHOS 66 11/29/2023    BILITOT 0.7 11/29/2023       LIPID PANEL -   Lab Results   Component Value Date    CHOL 196 11/29/2023    TRIG 88 11/29/2023    HDL 50.3 11/29/2023    CHHDL 3.9 11/29/2023    LDLF 130 (H) 11/04/2022    VLDL 18 11/29/2023    NHDL 146 11/29/2023       RENAL FUNCTION PANEL -   Lab Results   Component Value Date    GLUCOSE 93 01/09/2024     01/09/2024    K 5.3 01/09/2024     01/09/2024    CO2 25 01/09/2024    ANIONGAP 13 01/09/2024    BUN 13 01/09/2024    CREATININE 1.11 01/09/2024    GFRMALE 67 11/04/2022    CALCIUM 9.5 01/09/2024    ALBUMIN 3.8 11/29/2023        Lab Results   Component Value Date    HGBA1C 5.6 01/09/2024       Last Cardiology Tests:  ECG:  No results found for this or any previous visit from the past 1095 days.      Assessment/Plan       Cardiovascular risk assessment: Planned right upper lobe lobectomy.  RCRI score of 1 based on abnormal EKG and the presence of CAD from calcium score 15.  Asymptomatic at baseline.  He is able to achieve greater than 4 METS of activity.  Most recent labs and testing reviewed.  Based on these factors his risk of major adverse cardiac events is low to intermediate at best.  He appears optimized from medical standpoint.  The decision to proceed with surgery  is as per referring.    CAD: Calcium score 15.  Previously on simvastatin but developed itching.  Recommend pravastatin and or atorvastatin however patient is very reluctant at this time to initiate new therapies.  He wants to think about it and discuss it with his cardiologist Dr. Chou in the future.    Baseline EKG of sinus rhythm with incomplete right bundle branch block morphology.    Thoracic aneurysm 3.9 cm by previous assessment.  Repeat echo is ordered.    Hyperlipidemia: .  Intolerant to simvastatin.  Recommend initiation of additional statin therapy however patient is reluctant at this time and will discuss further with Dr. Chou in the future.    Hypertension: Controlled with lisinopril 2.5 mg daily.    Echocardiogram ordered  Cardiovascular risk assessment as above  Advised heart healthy plant-based diet and exercise  Follow-up with Dr. Chou within 6 to 12 months    (This note was generated with voice recognition software and may contain errors including spelling, grammar, syntax and missed recognition of what was dictated, of which may not have been fully corrected)       Torin Beasley MD PhD

## 2024-01-11 LAB — STAPHYLOCOCCUS SPEC CULT: NORMAL

## 2024-01-16 ENCOUNTER — HOSPITAL ENCOUNTER (OUTPATIENT)
Dept: CARDIOLOGY | Facility: CLINIC | Age: 74
Discharge: HOME | End: 2024-01-16
Payer: MEDICARE

## 2024-01-16 VITALS
WEIGHT: 245 LBS | DIASTOLIC BLOOD PRESSURE: 80 MMHG | SYSTOLIC BLOOD PRESSURE: 142 MMHG | HEIGHT: 75 IN | BODY MASS INDEX: 30.46 KG/M2

## 2024-01-16 DIAGNOSIS — Z01.818 ENCOUNTER FOR OTHER PREPROCEDURAL EXAMINATION: ICD-10-CM

## 2024-01-16 DIAGNOSIS — I25.10 ARTERIOSCLEROSIS OF CORONARY ARTERY: Chronic | ICD-10-CM

## 2024-01-16 DIAGNOSIS — E78.49 OTHER HYPERLIPIDEMIA: Chronic | ICD-10-CM

## 2024-01-16 DIAGNOSIS — I71.20 THORACIC AORTIC ANEURYSM WITHOUT RUPTURE, UNSPECIFIED PART (CMS-HCC): Chronic | ICD-10-CM

## 2024-01-16 DIAGNOSIS — I20.9 CARDIAC ANGINA (CMS-HCC): ICD-10-CM

## 2024-01-16 DIAGNOSIS — I10 PRIMARY HYPERTENSION: ICD-10-CM

## 2024-01-16 DIAGNOSIS — Z01.810 PREOPERATIVE CARDIOVASCULAR EXAMINATION: ICD-10-CM

## 2024-01-16 DIAGNOSIS — R06.02 SHORTNESS OF BREATH: ICD-10-CM

## 2024-01-16 LAB
AORTIC VALVE MEAN GRADIENT: 3
AORTIC VALVE PEAK VELOCITY: 1.24
AV PEAK GRADIENT: 6.1
AVA (PEAK VEL): 3.29
AVA (VTI): 3.75
EJECTION FRACTION APICAL 4 CHAMBER: 65.8
EJECTION FRACTION: 64
LEFT ATRIUM VOLUME AREA LENGTH INDEX BSA: 17.1
LEFT VENTRICLE INTERNAL DIMENSION DIASTOLE: 3.87 (ref 3.5–6)
LEFT VENTRICULAR OUTFLOW TRACT DIAMETER: 2.29
MITRAL VALVE E/A RATIO: 0.6
RIGHT VENTRICLE FREE WALL PEAK S': 0.1
TRICUSPID ANNULAR PLANE SYSTOLIC EXCURSION: 1.3

## 2024-01-16 PROCEDURE — 93306 TTE W/DOPPLER COMPLETE: CPT

## 2024-01-16 PROCEDURE — 93306 TTE W/DOPPLER COMPLETE: CPT | Performed by: STUDENT IN AN ORGANIZED HEALTH CARE EDUCATION/TRAINING PROGRAM

## 2024-01-17 ENCOUNTER — TELEPHONE (OUTPATIENT)
Dept: CARDIOLOGY | Facility: CLINIC | Age: 74
End: 2024-01-17
Payer: MEDICARE

## 2024-01-17 DIAGNOSIS — I71.20 THORACIC AORTIC ANEURYSM (TAA), UNSPECIFIED PART, UNSPECIFIED WHETHER RUPTURED (CMS-HCC): Chronic | ICD-10-CM

## 2024-01-17 NOTE — TELEPHONE ENCOUNTER
----- Message from Torin Beasley MD PhD sent at 1/16/2024  5:07 PM EST -----  Lets arrange for patient to undergo ct angio of the chest for thoracic aneurysm measuring 4.5 cm. He generally follows with Dr. Chou but saw us in clinic recently.

## 2024-01-17 NOTE — TELEPHONE ENCOUNTER
Patient has surgery with Dr. Bronson Monday 1/22/24. Clarified with Dr. Beasley if CT angio needs to be done prior to surgery. Per Dr. Beasley: CT angio can be done within 6 months and does not need to be done prior to surgery Monday. Spoke to patient and made aware of results. Order placed. # given to schedule test.

## 2024-01-20 ENCOUNTER — ANESTHESIA EVENT (OUTPATIENT)
Dept: OPERATING ROOM | Facility: HOSPITAL | Age: 74
DRG: 164 | End: 2024-01-20
Payer: MEDICARE

## 2024-01-22 ENCOUNTER — ANESTHESIA (OUTPATIENT)
Dept: OPERATING ROOM | Facility: HOSPITAL | Age: 74
DRG: 164 | End: 2024-01-22
Payer: MEDICARE

## 2024-01-22 ENCOUNTER — APPOINTMENT (OUTPATIENT)
Dept: RADIOLOGY | Facility: HOSPITAL | Age: 74
DRG: 164 | End: 2024-01-22
Payer: MEDICARE

## 2024-01-22 ENCOUNTER — HOSPITAL ENCOUNTER (INPATIENT)
Facility: HOSPITAL | Age: 74
LOS: 7 days | Discharge: HOME | DRG: 164 | End: 2024-01-29
Attending: THORACIC SURGERY (CARDIOTHORACIC VASCULAR SURGERY) | Admitting: THORACIC SURGERY (CARDIOTHORACIC VASCULAR SURGERY)
Payer: MEDICARE

## 2024-01-22 DIAGNOSIS — R91.8 PULMONARY NODULES/LESIONS, MULTIPLE: ICD-10-CM

## 2024-01-22 DIAGNOSIS — C34.91 ADENOCARCINOMA OF RIGHT LUNG (MULTI): Primary | ICD-10-CM

## 2024-01-22 PROBLEM — K21.9 GASTROESOPHAGEAL REFLUX DISEASE: Status: ACTIVE | Noted: 2024-01-22

## 2024-01-22 PROBLEM — G47.33 OSA (OBSTRUCTIVE SLEEP APNEA): Status: ACTIVE | Noted: 2024-01-22

## 2024-01-22 PROCEDURE — 07T74ZZ RESECTION OF THORAX LYMPHATIC, PERCUTANEOUS ENDOSCOPIC APPROACH: ICD-10-PCS | Performed by: THORACIC SURGERY (CARDIOTHORACIC VASCULAR SURGERY)

## 2024-01-22 PROCEDURE — 88307 TISSUE EXAM BY PATHOLOGIST: CPT | Mod: TC,SUR | Performed by: THORACIC SURGERY (CARDIOTHORACIC VASCULAR SURGERY)

## 2024-01-22 PROCEDURE — 0BJ08ZZ INSPECTION OF TRACHEOBRONCHIAL TREE, VIA NATURAL OR ARTIFICIAL OPENING ENDOSCOPIC: ICD-10-PCS | Performed by: THORACIC SURGERY (CARDIOTHORACIC VASCULAR SURGERY)

## 2024-01-22 PROCEDURE — 32674 THORACOSCOPY LYMPH NODE EXC: CPT | Performed by: PHYSICIAN ASSISTANT

## 2024-01-22 PROCEDURE — 36620 INSERTION CATHETER ARTERY: CPT | Performed by: STUDENT IN AN ORGANIZED HEALTH CARE EDUCATION/TRAINING PROGRAM

## 2024-01-22 PROCEDURE — 2500000004 HC RX 250 GENERAL PHARMACY W/ HCPCS (ALT 636 FOR OP/ED): Performed by: STUDENT IN AN ORGANIZED HEALTH CARE EDUCATION/TRAINING PROGRAM

## 2024-01-22 PROCEDURE — 94640 AIRWAY INHALATION TREATMENT: CPT

## 2024-01-22 PROCEDURE — 81458 SO GSAP DNA CPY NMBR&MCRSTL: CPT | Performed by: THORACIC SURGERY (CARDIOTHORACIC VASCULAR SURGERY)

## 2024-01-22 PROCEDURE — 88305 TISSUE EXAM BY PATHOLOGIST: CPT | Performed by: STUDENT IN AN ORGANIZED HEALTH CARE EDUCATION/TRAINING PROGRAM

## 2024-01-22 PROCEDURE — 3600000004 HC OR TIME - INITIAL BASE CHARGE - PROCEDURE LEVEL FOUR: Performed by: THORACIC SURGERY (CARDIOTHORACIC VASCULAR SURGERY)

## 2024-01-22 PROCEDURE — 7100000001 HC RECOVERY ROOM TIME - INITIAL BASE CHARGE: Performed by: THORACIC SURGERY (CARDIOTHORACIC VASCULAR SURGERY)

## 2024-01-22 PROCEDURE — 8E0W4CZ ROBOTIC ASSISTED PROCEDURE OF TRUNK REGION, PERCUTANEOUS ENDOSCOPIC APPROACH: ICD-10-PCS | Performed by: THORACIC SURGERY (CARDIOTHORACIC VASCULAR SURGERY)

## 2024-01-22 PROCEDURE — 2500000001 HC RX 250 WO HCPCS SELF ADMINISTERED DRUGS (ALT 637 FOR MEDICARE OP): Performed by: STUDENT IN AN ORGANIZED HEALTH CARE EDUCATION/TRAINING PROGRAM

## 2024-01-22 PROCEDURE — 3700000002 HC GENERAL ANESTHESIA TIME - EACH INCREMENTAL 1 MINUTE: Performed by: THORACIC SURGERY (CARDIOTHORACIC VASCULAR SURGERY)

## 2024-01-22 PROCEDURE — 88381 MICRODISSECTION MANUAL: CPT | Performed by: STUDENT IN AN ORGANIZED HEALTH CARE EDUCATION/TRAINING PROGRAM

## 2024-01-22 PROCEDURE — 32669 THORACOSCOPY REMOVE SEGMENT: CPT | Performed by: THORACIC SURGERY (CARDIOTHORACIC VASCULAR SURGERY)

## 2024-01-22 PROCEDURE — 81445 SO NEO GSAP 5-50DNA/DNA&RNA: CPT | Performed by: THORACIC SURGERY (CARDIOTHORACIC VASCULAR SURGERY)

## 2024-01-22 PROCEDURE — 2500000001 HC RX 250 WO HCPCS SELF ADMINISTERED DRUGS (ALT 637 FOR MEDICARE OP): Performed by: THORACIC SURGERY (CARDIOTHORACIC VASCULAR SURGERY)

## 2024-01-22 PROCEDURE — 3600000009 HC OR TIME - EACH INCREMENTAL 1 MINUTE - PROCEDURE LEVEL FOUR: Performed by: THORACIC SURGERY (CARDIOTHORACIC VASCULAR SURGERY)

## 2024-01-22 PROCEDURE — 2780000003 HC OR 278 NO HCPCS: Performed by: THORACIC SURGERY (CARDIOTHORACIC VASCULAR SURGERY)

## 2024-01-22 PROCEDURE — 71045 X-RAY EXAM CHEST 1 VIEW: CPT | Performed by: RADIOLOGY

## 2024-01-22 PROCEDURE — 2500000005 HC RX 250 GENERAL PHARMACY W/O HCPCS: Performed by: STUDENT IN AN ORGANIZED HEALTH CARE EDUCATION/TRAINING PROGRAM

## 2024-01-22 PROCEDURE — 7100000002 HC RECOVERY ROOM TIME - EACH INCREMENTAL 1 MINUTE: Performed by: THORACIC SURGERY (CARDIOTHORACIC VASCULAR SURGERY)

## 2024-01-22 PROCEDURE — 32674 THORACOSCOPY LYMPH NODE EXC: CPT | Performed by: THORACIC SURGERY (CARDIOTHORACIC VASCULAR SURGERY)

## 2024-01-22 PROCEDURE — 3700000001 HC GENERAL ANESTHESIA TIME - INITIAL BASE CHARGE: Performed by: THORACIC SURGERY (CARDIOTHORACIC VASCULAR SURGERY)

## 2024-01-22 PROCEDURE — 88341 IMHCHEM/IMCYTCHM EA ADD ANTB: CPT | Performed by: STUDENT IN AN ORGANIZED HEALTH CARE EDUCATION/TRAINING PROGRAM

## 2024-01-22 PROCEDURE — 88307 TISSUE EXAM BY PATHOLOGIST: CPT | Performed by: STUDENT IN AN ORGANIZED HEALTH CARE EDUCATION/TRAINING PROGRAM

## 2024-01-22 PROCEDURE — 1100000001 HC PRIVATE ROOM DAILY

## 2024-01-22 PROCEDURE — G0452 MOLECULAR PATHOLOGY INTERPR: HCPCS | Performed by: THORACIC SURGERY (CARDIOTHORACIC VASCULAR SURGERY)

## 2024-01-22 PROCEDURE — 32663 THORACOSCOPY W/LOBECTOMY: CPT | Performed by: THORACIC SURGERY (CARDIOTHORACIC VASCULAR SURGERY)

## 2024-01-22 PROCEDURE — 32668 THORACOSCOPY W/W RESECT DIAG: CPT | Performed by: PHYSICIAN ASSISTANT

## 2024-01-22 PROCEDURE — 2500000004 HC RX 250 GENERAL PHARMACY W/ HCPCS (ALT 636 FOR OP/ED): Performed by: THORACIC SURGERY (CARDIOTHORACIC VASCULAR SURGERY)

## 2024-01-22 PROCEDURE — 88309 TISSUE EXAM BY PATHOLOGIST: CPT | Performed by: STUDENT IN AN ORGANIZED HEALTH CARE EDUCATION/TRAINING PROGRAM

## 2024-01-22 PROCEDURE — 99100 ANES PT EXTEME AGE<1 YR&>70: CPT | Performed by: ANESTHESIOLOGY

## 2024-01-22 PROCEDURE — 2500000002 HC RX 250 W HCPCS SELF ADMINISTERED DRUGS (ALT 637 FOR MEDICARE OP, ALT 636 FOR OP/ED): Performed by: STUDENT IN AN ORGANIZED HEALTH CARE EDUCATION/TRAINING PROGRAM

## 2024-01-22 PROCEDURE — 32663 THORACOSCOPY W/LOBECTOMY: CPT | Performed by: PHYSICIAN ASSISTANT

## 2024-01-22 PROCEDURE — A32663 PR THORACOSCOPY SURG LOBECTOMY: Performed by: ANESTHESIOLOGY

## 2024-01-22 PROCEDURE — 71045 X-RAY EXAM CHEST 1 VIEW: CPT

## 2024-01-22 PROCEDURE — 88342 IMHCHEM/IMCYTCHM 1ST ANTB: CPT | Performed by: STUDENT IN AN ORGANIZED HEALTH CARE EDUCATION/TRAINING PROGRAM

## 2024-01-22 PROCEDURE — A4217 STERILE WATER/SALINE, 500 ML: HCPCS | Performed by: THORACIC SURGERY (CARDIOTHORACIC VASCULAR SURGERY)

## 2024-01-22 PROCEDURE — 2500000005 HC RX 250 GENERAL PHARMACY W/O HCPCS: Performed by: THORACIC SURGERY (CARDIOTHORACIC VASCULAR SURGERY)

## 2024-01-22 PROCEDURE — 0BTC4ZZ RESECTION OF RIGHT UPPER LUNG LOBE, PERCUTANEOUS ENDOSCOPIC APPROACH: ICD-10-PCS | Performed by: THORACIC SURGERY (CARDIOTHORACIC VASCULAR SURGERY)

## 2024-01-22 PROCEDURE — 88360 TUMOR IMMUNOHISTOCHEM/MANUAL: CPT | Performed by: STUDENT IN AN ORGANIZED HEALTH CARE EDUCATION/TRAINING PROGRAM

## 2024-01-22 PROCEDURE — 2720000007 HC OR 272 NO HCPCS: Performed by: THORACIC SURGERY (CARDIOTHORACIC VASCULAR SURGERY)

## 2024-01-22 RX ORDER — GABAPENTIN 300 MG/1
300 CAPSULE ORAL ONCE
Status: COMPLETED | OUTPATIENT
Start: 2024-01-22 | End: 2024-01-22

## 2024-01-22 RX ORDER — NALOXONE HYDROCHLORIDE 0.4 MG/ML
0.2 INJECTION, SOLUTION INTRAMUSCULAR; INTRAVENOUS; SUBCUTANEOUS EVERY 5 MIN PRN
Status: DISCONTINUED | OUTPATIENT
Start: 2024-01-22 | End: 2024-01-29 | Stop reason: HOSPADM

## 2024-01-22 RX ORDER — LABETALOL HYDROCHLORIDE 5 MG/ML
5 INJECTION, SOLUTION INTRAVENOUS ONCE AS NEEDED
Status: DISCONTINUED | OUTPATIENT
Start: 2024-01-22 | End: 2024-01-22 | Stop reason: HOSPADM

## 2024-01-22 RX ORDER — METOPROLOL TARTRATE 1 MG/ML
INJECTION, SOLUTION INTRAVENOUS AS NEEDED
Status: DISCONTINUED | OUTPATIENT
Start: 2024-01-22 | End: 2024-01-22

## 2024-01-22 RX ORDER — IPRATROPIUM BROMIDE AND ALBUTEROL SULFATE 2.5; .5 MG/3ML; MG/3ML
3 SOLUTION RESPIRATORY (INHALATION)
Status: DISCONTINUED | OUTPATIENT
Start: 2024-01-22 | End: 2024-01-29 | Stop reason: HOSPADM

## 2024-01-22 RX ORDER — PROPOFOL 10 MG/ML
INJECTION, EMULSION INTRAVENOUS AS NEEDED
Status: DISCONTINUED | OUTPATIENT
Start: 2024-01-22 | End: 2024-01-22

## 2024-01-22 RX ORDER — SODIUM CHLORIDE, SODIUM LACTATE, POTASSIUM CHLORIDE, CALCIUM CHLORIDE 600; 310; 30; 20 MG/100ML; MG/100ML; MG/100ML; MG/100ML
100 INJECTION, SOLUTION INTRAVENOUS CONTINUOUS
Status: DISCONTINUED | OUTPATIENT
Start: 2024-01-22 | End: 2024-01-22 | Stop reason: HOSPADM

## 2024-01-22 RX ORDER — HYDROMORPHONE HYDROCHLORIDE 1 MG/ML
INJECTION, SOLUTION INTRAMUSCULAR; INTRAVENOUS; SUBCUTANEOUS AS NEEDED
Status: DISCONTINUED | OUTPATIENT
Start: 2024-01-22 | End: 2024-01-22

## 2024-01-22 RX ORDER — ONDANSETRON HYDROCHLORIDE 2 MG/ML
4 INJECTION, SOLUTION INTRAVENOUS ONCE AS NEEDED
Status: DISCONTINUED | OUTPATIENT
Start: 2024-01-22 | End: 2024-01-22 | Stop reason: HOSPADM

## 2024-01-22 RX ORDER — FENTANYL CITRATE 50 UG/ML
INJECTION, SOLUTION INTRAMUSCULAR; INTRAVENOUS
Status: COMPLETED
Start: 2024-01-22 | End: 2024-01-22

## 2024-01-22 RX ORDER — CEFAZOLIN SODIUM 2 G/100ML
2 INJECTION, SOLUTION INTRAVENOUS ONCE
Status: DISCONTINUED | OUTPATIENT
Start: 2024-01-22 | End: 2024-01-22 | Stop reason: HOSPADM

## 2024-01-22 RX ORDER — ACETAMINOPHEN 325 MG/1
650 TABLET ORAL EVERY 4 HOURS PRN
Status: DISCONTINUED | OUTPATIENT
Start: 2024-01-22 | End: 2024-01-22 | Stop reason: HOSPADM

## 2024-01-22 RX ORDER — AMOXICILLIN 250 MG
2 CAPSULE ORAL 2 TIMES DAILY
Status: DISCONTINUED | OUTPATIENT
Start: 2024-01-22 | End: 2024-01-29 | Stop reason: HOSPADM

## 2024-01-22 RX ORDER — LIDOCAINE HYDROCHLORIDE 10 MG/ML
0.1 INJECTION INFILTRATION; PERINEURAL ONCE
Status: DISCONTINUED | OUTPATIENT
Start: 2024-01-22 | End: 2024-01-22 | Stop reason: HOSPADM

## 2024-01-22 RX ORDER — METOPROLOL TARTRATE 1 MG/ML
5 INJECTION, SOLUTION INTRAVENOUS
Status: DISCONTINUED | OUTPATIENT
Start: 2024-01-22 | End: 2024-01-23

## 2024-01-22 RX ORDER — HYDROMORPHONE HYDROCHLORIDE 1 MG/ML
INJECTION, SOLUTION INTRAMUSCULAR; INTRAVENOUS; SUBCUTANEOUS
Status: COMPLETED
Start: 2024-01-22 | End: 2024-01-22

## 2024-01-22 RX ORDER — PHENYLEPHRINE HCL IN 0.9% NACL 0.4MG/10ML
SYRINGE (ML) INTRAVENOUS AS NEEDED
Status: DISCONTINUED | OUTPATIENT
Start: 2024-01-22 | End: 2024-01-22

## 2024-01-22 RX ORDER — ACETAMINOPHEN 325 MG/1
650 TABLET ORAL ONCE
Status: COMPLETED | OUTPATIENT
Start: 2024-01-22 | End: 2024-01-22

## 2024-01-22 RX ORDER — HYDROMORPHONE HYDROCHLORIDE 1 MG/ML
0.2 INJECTION, SOLUTION INTRAMUSCULAR; INTRAVENOUS; SUBCUTANEOUS EVERY 5 MIN PRN
Status: DISCONTINUED | OUTPATIENT
Start: 2024-01-22 | End: 2024-01-22 | Stop reason: HOSPADM

## 2024-01-22 RX ORDER — DEXAMETHASONE SODIUM PHOSPHATE 4 MG/ML
INJECTION, SOLUTION INTRA-ARTICULAR; INTRALESIONAL; INTRAMUSCULAR; INTRAVENOUS; SOFT TISSUE AS NEEDED
Status: DISCONTINUED | OUTPATIENT
Start: 2024-01-22 | End: 2024-01-22

## 2024-01-22 RX ORDER — CEFAZOLIN 1 G/1
INJECTION, POWDER, FOR SOLUTION INTRAVENOUS AS NEEDED
Status: DISCONTINUED | OUTPATIENT
Start: 2024-01-22 | End: 2024-01-22

## 2024-01-22 RX ORDER — ONDANSETRON HYDROCHLORIDE 2 MG/ML
INJECTION, SOLUTION INTRAVENOUS AS NEEDED
Status: DISCONTINUED | OUTPATIENT
Start: 2024-01-22 | End: 2024-01-22

## 2024-01-22 RX ORDER — PROPOFOL 10 MG/ML
INJECTION, EMULSION INTRAVENOUS
Status: COMPLETED
Start: 2024-01-22 | End: 2024-01-22

## 2024-01-22 RX ORDER — HYDROMORPHONE HCL/0.9% NACL/PF 15 MG/30ML
PATIENT CONTROLLED ANALGESIA SYRINGE INTRAVENOUS CONTINUOUS
Status: DISCONTINUED | OUTPATIENT
Start: 2024-01-22 | End: 2024-01-23

## 2024-01-22 RX ORDER — LIDOCAINE HYDROCHLORIDE 20 MG/ML
INJECTION, SOLUTION INFILTRATION; PERINEURAL AS NEEDED
Status: DISCONTINUED | OUTPATIENT
Start: 2024-01-22 | End: 2024-01-22

## 2024-01-22 RX ORDER — BUPIVACAINE HYDROCHLORIDE 2.5 MG/ML
INJECTION, SOLUTION INFILTRATION; PERINEURAL AS NEEDED
Status: DISCONTINUED | OUTPATIENT
Start: 2024-01-22 | End: 2024-01-22 | Stop reason: HOSPADM

## 2024-01-22 RX ORDER — ACETAMINOPHEN 325 MG/1
650 TABLET ORAL EVERY 6 HOURS
Status: DISCONTINUED | OUTPATIENT
Start: 2024-01-22 | End: 2024-01-29 | Stop reason: HOSPADM

## 2024-01-22 RX ORDER — SODIUM CHLORIDE 0.9 G/100ML
IRRIGANT IRRIGATION AS NEEDED
Status: DISCONTINUED | OUTPATIENT
Start: 2024-01-22 | End: 2024-01-22 | Stop reason: HOSPADM

## 2024-01-22 RX ORDER — FENTANYL CITRATE 50 UG/ML
INJECTION, SOLUTION INTRAMUSCULAR; INTRAVENOUS AS NEEDED
Status: DISCONTINUED | OUTPATIENT
Start: 2024-01-22 | End: 2024-01-22

## 2024-01-22 RX ORDER — HEPARIN SODIUM 5000 [USP'U]/ML
5000 INJECTION, SOLUTION INTRAVENOUS; SUBCUTANEOUS ONCE
Status: COMPLETED | OUTPATIENT
Start: 2024-01-22 | End: 2024-01-22

## 2024-01-22 RX ORDER — SODIUM CHLORIDE, SODIUM LACTATE, POTASSIUM CHLORIDE, CALCIUM CHLORIDE 600; 310; 30; 20 MG/100ML; MG/100ML; MG/100ML; MG/100ML
INJECTION, SOLUTION INTRAVENOUS CONTINUOUS PRN
Status: DISCONTINUED | OUTPATIENT
Start: 2024-01-22 | End: 2024-01-22

## 2024-01-22 RX ORDER — ROCURONIUM BROMIDE 10 MG/ML
INJECTION, SOLUTION INTRAVENOUS AS NEEDED
Status: DISCONTINUED | OUTPATIENT
Start: 2024-01-22 | End: 2024-01-22

## 2024-01-22 RX ORDER — LABETALOL HYDROCHLORIDE 5 MG/ML
INJECTION, SOLUTION INTRAVENOUS AS NEEDED
Status: DISCONTINUED | OUTPATIENT
Start: 2024-01-22 | End: 2024-01-22

## 2024-01-22 RX ORDER — HEPARIN SODIUM 5000 [USP'U]/ML
5000 INJECTION, SOLUTION INTRAVENOUS; SUBCUTANEOUS EVERY 8 HOURS
Status: DISCONTINUED | OUTPATIENT
Start: 2024-01-22 | End: 2024-01-29 | Stop reason: HOSPADM

## 2024-01-22 RX ORDER — NALOXONE HYDROCHLORIDE 0.4 MG/ML
0.2 INJECTION, SOLUTION INTRAMUSCULAR; INTRAVENOUS; SUBCUTANEOUS AS NEEDED
Status: DISCONTINUED | OUTPATIENT
Start: 2024-01-22 | End: 2024-01-29 | Stop reason: HOSPADM

## 2024-01-22 RX ORDER — HYDROMORPHONE HYDROCHLORIDE 1 MG/ML
0.5 INJECTION, SOLUTION INTRAMUSCULAR; INTRAVENOUS; SUBCUTANEOUS EVERY 5 MIN PRN
Status: DISCONTINUED | OUTPATIENT
Start: 2024-01-22 | End: 2024-01-22 | Stop reason: HOSPADM

## 2024-01-22 RX ADMIN — METOPROLOL TARTRATE 5 MG: 1 INJECTION, SOLUTION INTRAVENOUS at 11:58

## 2024-01-22 RX ADMIN — Medication 120 MCG: at 15:26

## 2024-01-22 RX ADMIN — SUGAMMADEX 200 MG: 100 INJECTION, SOLUTION INTRAVENOUS at 15:56

## 2024-01-22 RX ADMIN — ROCURONIUM BROMIDE 20 MG: 10 INJECTION, SOLUTION INTRAVENOUS at 13:52

## 2024-01-22 RX ADMIN — PROPOFOL 150 MG: 10 INJECTION, EMULSION INTRAVENOUS at 11:58

## 2024-01-22 RX ADMIN — GABAPENTIN 300 MG: 300 CAPSULE ORAL at 09:50

## 2024-01-22 RX ADMIN — ACETAMINOPHEN 650 MG: 325 TABLET ORAL at 09:50

## 2024-01-22 RX ADMIN — IPRATROPIUM BROMIDE AND ALBUTEROL SULFATE 3 ML: .5; 3 SOLUTION RESPIRATORY (INHALATION) at 21:45

## 2024-01-22 RX ADMIN — ONDANSETRON 4 MG: 2 INJECTION, SOLUTION INTRAMUSCULAR; INTRAVENOUS at 15:35

## 2024-01-22 RX ADMIN — Medication 80 MCG: at 15:29

## 2024-01-22 RX ADMIN — HYDROMORPHONE HYDROCHLORIDE 0.5 MG: 1 INJECTION, SOLUTION INTRAMUSCULAR; INTRAVENOUS; SUBCUTANEOUS at 16:29

## 2024-01-22 RX ADMIN — HYDROMORPHONE HYDROCHLORIDE 0.5 MG: 1 INJECTION, SOLUTION INTRAMUSCULAR; INTRAVENOUS; SUBCUTANEOUS at 15:11

## 2024-01-22 RX ADMIN — HEPARIN SODIUM 5000 UNITS: 5000 INJECTION INTRAVENOUS; SUBCUTANEOUS at 20:36

## 2024-01-22 RX ADMIN — ROCURONIUM BROMIDE 100 MG: 10 INJECTION, SOLUTION INTRAVENOUS at 11:58

## 2024-01-22 RX ADMIN — ACETAMINOPHEN 650 MG: 325 TABLET ORAL at 20:37

## 2024-01-22 RX ADMIN — HEPARIN SODIUM 5000 UNITS: 5000 INJECTION INTRAVENOUS; SUBCUTANEOUS at 09:50

## 2024-01-22 RX ADMIN — SENNOSIDES AND DOCUSATE SODIUM 2 TABLET: 8.6; 5 TABLET ORAL at 20:36

## 2024-01-22 RX ADMIN — SODIUM CHLORIDE, POTASSIUM CHLORIDE, SODIUM LACTATE AND CALCIUM CHLORIDE: 600; 310; 30; 20 INJECTION, SOLUTION INTRAVENOUS at 11:35

## 2024-01-22 RX ADMIN — METOPROLOL TARTRATE 5 MG: 1 INJECTION, SOLUTION INTRAVENOUS at 20:36

## 2024-01-22 RX ADMIN — DEXAMETHASONE SODIUM PHOSPHATE 4 MG: 4 INJECTION, SOLUTION INTRAMUSCULAR; INTRAVENOUS at 13:51

## 2024-01-22 RX ADMIN — PROPOFOL 20 MG: 10 INJECTION, EMULSION INTRAVENOUS at 15:13

## 2024-01-22 RX ADMIN — HYDROMORPHONE HYDROCHLORIDE 0.5 MG: 1 INJECTION, SOLUTION INTRAMUSCULAR; INTRAVENOUS; SUBCUTANEOUS at 16:44

## 2024-01-22 RX ADMIN — CEFAZOLIN 3 G: 330 INJECTION, POWDER, FOR SOLUTION INTRAMUSCULAR; INTRAVENOUS at 12:31

## 2024-01-22 RX ADMIN — LABETALOL HYDROCHLORIDE 10 MG: 5 INJECTION, SOLUTION INTRAVENOUS at 12:37

## 2024-01-22 RX ADMIN — LIDOCAINE HYDROCHLORIDE 100 MG: 20 INJECTION, SOLUTION INFILTRATION; PERINEURAL at 11:58

## 2024-01-22 RX ADMIN — Medication: at 17:15

## 2024-01-22 RX ADMIN — ROCURONIUM BROMIDE 10 MG: 10 INJECTION, SOLUTION INTRAVENOUS at 15:14

## 2024-01-22 SDOH — HEALTH STABILITY: MENTAL HEALTH: CURRENT SMOKER: 0

## 2024-01-22 SDOH — SOCIAL STABILITY: SOCIAL INSECURITY: HAS ANYONE EVER THREATENED TO HURT YOUR FAMILY OR YOUR PETS?: NO

## 2024-01-22 SDOH — SOCIAL STABILITY: SOCIAL INSECURITY: ABUSE: ADULT

## 2024-01-22 SDOH — SOCIAL STABILITY: SOCIAL INSECURITY: DO YOU FEEL UNSAFE GOING BACK TO THE PLACE WHERE YOU ARE LIVING?: NO

## 2024-01-22 SDOH — SOCIAL STABILITY: SOCIAL INSECURITY: DO YOU FEEL ANYONE HAS EXPLOITED OR TAKEN ADVANTAGE OF YOU FINANCIALLY OR OF YOUR PERSONAL PROPERTY?: NO

## 2024-01-22 SDOH — SOCIAL STABILITY: SOCIAL INSECURITY: ARE YOU OR HAVE YOU BEEN THREATENED OR ABUSED PHYSICALLY, EMOTIONALLY, OR SEXUALLY BY ANYONE?: NO

## 2024-01-22 SDOH — SOCIAL STABILITY: SOCIAL INSECURITY: ARE THERE ANY APPARENT SIGNS OF INJURIES/BEHAVIORS THAT COULD BE RELATED TO ABUSE/NEGLECT?: NO

## 2024-01-22 SDOH — SOCIAL STABILITY: SOCIAL INSECURITY: WERE YOU ABLE TO COMPLETE ALL THE BEHAVIORAL HEALTH SCREENINGS?: YES

## 2024-01-22 SDOH — SOCIAL STABILITY: SOCIAL INSECURITY: DOES ANYONE TRY TO KEEP YOU FROM HAVING/CONTACTING OTHER FRIENDS OR DOING THINGS OUTSIDE YOUR HOME?: NO

## 2024-01-22 SDOH — SOCIAL STABILITY: SOCIAL INSECURITY: HAVE YOU HAD THOUGHTS OF HARMING ANYONE ELSE?: NO

## 2024-01-22 ASSESSMENT — COGNITIVE AND FUNCTIONAL STATUS - GENERAL
DAILY ACTIVITIY SCORE: 24
DAILY ACTIVITIY SCORE: 24
PATIENT BASELINE BEDBOUND: NO
MOBILITY SCORE: 24
MOBILITY SCORE: 24

## 2024-01-22 ASSESSMENT — ACTIVITIES OF DAILY LIVING (ADL)
GROOMING: INDEPENDENT
JUDGMENT_ADEQUATE_SAFELY_COMPLETE_DAILY_ACTIVITIES: YES
HEARING - RIGHT EAR: FUNCTIONAL
DRESSING YOURSELF: INDEPENDENT
HEARING - LEFT EAR: FUNCTIONAL
PATIENT'S MEMORY ADEQUATE TO SAFELY COMPLETE DAILY ACTIVITIES?: YES
WALKS IN HOME: INDEPENDENT
TOILETING: INDEPENDENT
FEEDING YOURSELF: INDEPENDENT
ADEQUATE_TO_COMPLETE_ADL: YES
BATHING: INDEPENDENT
LACK_OF_TRANSPORTATION: NO

## 2024-01-22 ASSESSMENT — PAIN SCALES - GENERAL
PAINLEVEL_OUTOF10: 0 - NO PAIN
PAINLEVEL_OUTOF10: 8
PAINLEVEL_OUTOF10: 5 - MODERATE PAIN
PAINLEVEL_OUTOF10: 5 - MODERATE PAIN
PAINLEVEL_OUTOF10: 8
PAINLEVEL_OUTOF10: 5 - MODERATE PAIN
PAINLEVEL_OUTOF10: 0 - NO PAIN
PAINLEVEL_OUTOF10: 8
PAINLEVEL_OUTOF10: 5 - MODERATE PAIN
PAINLEVEL_OUTOF10: 8
PAINLEVEL_OUTOF10: 4
PAIN_LEVEL: 7
PAINLEVEL_OUTOF10: 8

## 2024-01-22 ASSESSMENT — PATIENT HEALTH QUESTIONNAIRE - PHQ9
2. FEELING DOWN, DEPRESSED OR HOPELESS: NOT AT ALL
SUM OF ALL RESPONSES TO PHQ9 QUESTIONS 1 & 2: 0
1. LITTLE INTEREST OR PLEASURE IN DOING THINGS: NOT AT ALL

## 2024-01-22 ASSESSMENT — LIFESTYLE VARIABLES
AUDIT-C TOTAL SCORE: 0
HOW MANY STANDARD DRINKS CONTAINING ALCOHOL DO YOU HAVE ON A TYPICAL DAY: PATIENT DOES NOT DRINK
SKIP TO QUESTIONS 9-10: 1
HOW OFTEN DO YOU HAVE A DRINK CONTAINING ALCOHOL: NEVER
HOW OFTEN DO YOU HAVE 6 OR MORE DRINKS ON ONE OCCASION: NEVER
AUDIT-C TOTAL SCORE: 0

## 2024-01-22 ASSESSMENT — PAIN - FUNCTIONAL ASSESSMENT
PAIN_FUNCTIONAL_ASSESSMENT: 0-10
PAIN_FUNCTIONAL_ASSESSMENT: 0-10

## 2024-01-22 ASSESSMENT — COLUMBIA-SUICIDE SEVERITY RATING SCALE - C-SSRS
6. HAVE YOU EVER DONE ANYTHING, STARTED TO DO ANYTHING, OR PREPARED TO DO ANYTHING TO END YOUR LIFE?: NO
1. IN THE PAST MONTH, HAVE YOU WISHED YOU WERE DEAD OR WISHED YOU COULD GO TO SLEEP AND NOT WAKE UP?: NO
2. HAVE YOU ACTUALLY HAD ANY THOUGHTS OF KILLING YOURSELF?: NO

## 2024-01-22 NOTE — ANESTHESIA PROCEDURE NOTES
Airway  Date/Time: 1/22/2024 12:03 PM  Urgency: elective      Staffing  Performed: resident   Authorized by: Aristeo Max MD    Performed by: Nate Lorenzana DO  Patient location during procedure: OR    Indications and Patient Condition  Indications for airway management: anesthesia  Spontaneous Ventilation: absent  Sedation level: deep  Preoxygenated: yes  Patient position: sniffing  Mask difficulty assessment: 2 - vent by mask + OA or adjuvant +/- NMBA  Planned trial extubation    Final Airway Details  Final airway type: endotracheal airway      Successful airway: ETT - double lumen left  Cuffed: yes   Successful intubation technique: direct laryngoscopy  Endotracheal tube insertion site: oral  Blade: Katia  Blade size: #4  ETT DL size (fr): 37  Cormack-Lehane Classification: grade IIa - partial view of glottis  Placement verified by: bronchoscopy   Measured from: lips  ETT to lips (cm): 30  Number of attempts at approach: 1

## 2024-01-22 NOTE — ANESTHESIA PROCEDURE NOTES
Arterial Line:    Date/Time: 1/22/2024 12:12 PM    Staffing  Performed: resident   Authorized by: Aristeo Max MD    Performed by: Nate Lorenzana DO    An arterial line was placed. Procedure performed using ultrasound guidance.in the OR for the following indication(s): continuous blood pressure monitoring.    A 20 gauge (size), 1 and 3/8 inch (length), Angiocath (type) catheter was placed into the Left radial artery, secured by Tegademartin   Seldinger technique used.  Events:  patient tolerated procedure well with no complications.

## 2024-01-22 NOTE — PROGRESS NOTES
Pharmacy Medication History Review    Tao Renee is a 73 y.o. male admitted for Adenocarcinoma of right lung (CMS/[facility]). Pharmacy reviewed the patient's [id number] medications and allergies for accuracy.    The list below reflects the updated PTA list. Comments regarding how patient may be taking medications differently can be found in the Admit Orders Activity  Prior to Admission Medications   Prescriptions Last Dose Informant Patient Reported?   ALPRAZolam (Xanax) 0.5 mg tablet 1/21/2024 Self No   Sig: Take 1 tablet (0.5 mg) by mouth as needed at bedtime for anxiety.   NON FORMULARY Past Week Self Yes   Sig: Organic bitter apricot kernels 3-4 a day   cyanocobalamin (Vitamin B-12) 1,000 mcg tablet Past Week Self Yes   Sig: Take 1 tablet (1,000 mcg) by mouth once daily.   fluticasone (Flonase Allergy Relief) 50 mcg/actuation nasal spray 1/21/2024 Self Yes   Sig: Administer 1 spray into each nostril once daily as needed.   lisinopril 2.5 mg tablet Past Week Self No   Sig: Take 1 tablet (2.5 mg) by mouth once daily.   pyridoxine HCl, vitamin B6, (VITAMIN B-6 ORAL) Past Week Self Yes   Sig: Take 100 mg by mouth.   sildenafil (Viagra) 25 mg tablet Past Week Self Yes   Sig: Take 1 tablet (25 mg) by mouth. 1 hour before needed   thiamine 100 mg tablet 1/21/2024 Self Yes   Sig: Take by mouth.   topiramate (Topamax) 25 mg tablet 1/22/2024 Self No   Sig: Take 0.5 tablets (12.5 mg) by mouth once daily.      Facility-Administered Medications: None        The list below reflects the updated allergy list. Please review each documented allergy for additional clarification and justification.  Allergies  Reviewed by Rojas Caruso RN on 1/22/2024        Severity Reactions Comments    Atorvastatin Medium Itching     Azithromycin Medium Diarrhea             Patient declines M2B at discharge.     Sources used to complete the med history include out patient fill history, OARRS, and patient interview along with 1/9/24 pre  admission testing note.       Below are additional concerns with the patient's PTA list.      Emeterio Ortiz [facility]  Transitions of Care Clinical Pharmacist  Please reach out via Epic Chat for questions, if no response call  [phone] or [facility]

## 2024-01-22 NOTE — BRIEF OP NOTE
Brief Operative Report    Preoperative diagnosis: Right lung cancer  Post-operative diagnosis: Same  Procedure: Robotic right upper lobectomy and right lower superior segmentectomy    Date: 1/22/2024  Surgeon: Stephan Bronson MD  Fellow: Justine Crowley MD  Resident(s): None   Assistant: Stormy Sawant PA-C    Findings: Per full operative report  Specimen: Right upper lobe and right lower superior segment    IVF: 900 cc  EBL: 120 cc  UOP: 120 cc    Complications: None    Disposition: PACU > Floor ([place] [address])    Justine Crowley MD  Cardiothoracic Surgery Fellow  PGY-6  Pager: [phone]

## 2024-01-22 NOTE — ANESTHESIA POSTPROCEDURE EVALUATION
Patient: Tao Renee    Procedure Summary       Date: 01/22/24 Room / Location: [facility] OR 14 / [facility] OR    Anesthesia Start: 1151 Anesthesia Stop: 1621    Procedure: Bronchoscopy, Thoracoscopic/ Robotic right Upper lobe lobectomy, Mediastinal lymph node dissection, right lower lobe wedge, possible thoracotomy (Right: Chest) Diagnosis:       Adenocarcinoma of right lung (CMS/HCC)      (Adenocarcinoma of right lung (CMS/HCC) [C34.91])    Surgeons: Stephan Bronson MD Responsible Provider: Aristeo Max MD    Anesthesia Type: general ASA Status: 3            Anesthesia Type: general    Vitals Value Taken Time   /74 01/22/24 1615   Temp 36 01/22/24 1621   Pulse 67 01/22/24 1617   Resp 19 01/22/24 1617   SpO2 100 % 01/22/24 1617   Vitals shown include unvalidated device data.    Anesthesia Post Evaluation    Patient participation: complete - patient participated  Level of consciousness: sleepy but conscious  Pain score: 7  Pain management: adequate  Airway patency: patent  Cardiovascular status: acceptable  Respiratory status: acceptable  Hydration status: acceptable  Postoperative Nausea and Vomiting: none        No notable events documented.

## 2024-01-22 NOTE — OP NOTE
Bronchoscopy, Thoracoscopic/ Robotic right Upper lobe lobectomy, Mediastinal lymph node dissection, right lower lobe wedge, possible thoracotomy (R) Operative Note  Patient: Tao Renee  : 1950  MRN: [id number]    Preoperative Diagnosis: Adenocarcinoma of right lung (CMS/HCC) [C34.91]  Postoperative Diagnosis:  Recurrent right upper lobe lung cancer, right lower lobe lung nodule    Procedure:  Bronchoscopy, robotic right upper lobe lobectomy, mediastinal lymph node dissection, right lower lobe superior segmental resection    Surgeon: Surgeon(s):  LITO Brown MD Christopher W Towe, MD    Other Staff:   Surgeon(s) and Role:     * Stormy Sawant PA-C - Assisting     * Justine Crowley MD - Fellow   Circulator: Rojas Caruso RN; Jen Santiago RN  Relief Circulator: Heather Webb RN; Kelly Watts RN  Relief Scrub: Rojas Caruso RN; Rubio Cates  Scrub Person: Anita Castro RN; Linda Webb    Anesthesia Type: General    Indications: Tao Renee is an 73 y.o. male who presents for  surgical evaluation of right lower lobe lung nodule and right upper lobe lung cancer.  This patient has a complex cynical history, which centers around recurrent right upper lobe lung cancer which was initially treated with radiation.  His cancer appeared to be localized, and in consultation with medical oncology, I recommended that the patient receive right upper lobe lobectomy.  Concurrently, he had a right lower lobe nodule in the superior segment, which was concerning for a second malignant process.  I felt that concurrent surgical resection was reasonable given my significant clinical concern for malignancy.    The patient was seen in the preoperative area. The risks, benefits, complications, treatment options, non-operative alternatives, expected recovery and outcomes were discussed with the patient. The possibilities of reaction to medication, pulmonary aspiration, injury  to surrounding structures, bleeding, recurrent infection, the need for additional procedures, failure to diagnose a condition, and creating a complication requiring transfusion or operation were discussed with the patient. The patient concurred with the proposed plan, giving informed consent.  The site of surgery was properly noted/marked if necessary per policy.     Procedure Details:   The patient was placed on the operating table. A safety huddle was performed. General endotracheal anesthesia was initiated. Bronchoscopy was performed using the endotracheal tube which showed no evidence of endobronchial tumors or other lesions.  A double-lumen endotracheal tube was positioned and secured.    The patient was positioned in lateral decubitus position. The patient was prepped using chlorhexidine.     I started making 5 robotic/thoracoscopic port incisions in the right chest. I placed multi-level intercostal nerve blocks.  We used CO2 insufflation. We docked the robot and explored the chest.    There were adhesions of the right upper lobe to the mediastinum.  There was no evidence of pleural metastatic disease.  The pleura was well-developed.  There was scarring near the pulmonary artery and the fissure.  I started by dividing the inferior pulmonary ligament and taking lymph nodes from levels 8 and 9.  I also performed a subcarinal dissection and took lymph nodes from level 7.  I developed the annette between the right upper lobe bronchus and the bronchus intermedius posteriorly and encountered level 11 lymph nodes.  I took these, and this facilitated dividing the posterior fissure.  This allowed me to visualize the pulmonary artery in the fissure, which facilitated the remainder of the dissection.  I then turned my attention to the superior hilum.  I performed dissection here and took lymph nodes from levels 10.  I also performed a time-consuming dissection taking the lung off the mediastinal pleura.  After this, I  dissected the apical trunk of the pulmonary artery.  I dissected and divided this using robotic stapling.  I also encountered some level 11 lymph nodes in this area which were taken and sent off as well.  I then dissected and divided the superior vein.  Scarring in this area made this difficult and complex.  After this, I was able to complete the fissure working from the vein posteriorly towards the PA.  After this, I could clearly identify the posterior ascending branch of the pulmonary artery, which was encircled and divided using robotic stapling.  This left along the bronchus to divide.  This was divided using robotic stapling.    I left the specimen in situ and turned my attention to the right lower lobe.  I carefully assessed the right lower lobe, but was not able to appreciate the mass.  I felt that the mass could be safely removed with a superior segmental resection, and went about performing this dissection.  I have identified the PA in the fissure and progressed inferiorly.  In this dissection the first posterior branch was quite clearly the superior segmental branch.  I harvested level 12 lymph nodes.  This facilitated the dissection and division of the pulmonary artery branch of the superior segment.  Just deep to this I encountered the bronchus to the superior segment.  This was divided with stapling as well.  Lastly, I divided the pulmonary parenchyma.  This was performed using robotic stapling, freeing the specimen.  Both specimens were placed in Endo Catch bags and removed.    The chest was evaluated for hemostasis and felt to be appropriate. I placed a 28F chest tube and re-expanded the lung. The lung expanded well. The instruments were removed. The wounds were closed in layers.  The skin was dressed with Dermabond.  The procedure was completed and patient was brought to Recovery without evidence of immediate complications.    This procedure was 60% more difficult than a standard procedure based on  complex dissection required    Estimated blood loss:  120 ml  Complications: none  Disposition: Recovery  Condition: stable    Attending Attestation: I was present and scrubbed for the entire procedure.    Specimens:   ID Type Source Tests Collected by Time   1 : LEVEL 9 LYMPH NODE Tissue LYMPH NODE PULMONARY (SPECIFY SITE) SURGICAL PATHOLOGY EXAM Stephan Bronson MD 1/22/2024 1254   2 : LEVEL 7 LYMPH NODE Tissue LYMPH NODE PULMONARY (SPECIFY SITE) SURGICAL PATHOLOGY EXAM Stephan Bronson MD 1/22/2024 1255   3 : LEVEL 11RS Tissue LYMPH NODE PULMONARY (SPECIFY SITE) SURGICAL PATHOLOGY EXAM Stephan Bronson MD 1/22/2024 1259   4 : LEVEL 10 LYMPH NODE Tissue LYMPH NODE PULMONARY (SPECIFY SITE) SURGICAL PATHOLOGY EXAM Stephan Bronson MD 1/22/2024 1338   5 : LEVEL 11RI Tissue LYMPH NODE PULMONARY (SPECIFY SITE) SURGICAL PATHOLOGY EXAM Stephan Bronson MD 1/22/2024 1433   6 : LEVEL 12 LYMPH NODE Tissue LYMPH NODE PULMONARY (SPECIFY SITE) SURGICAL PATHOLOGY EXAM Stephan Bronson MD 1/22/2024 1449   7 : RIGHT LOWER LOBE SUPERIOR SEGMENT Tissue LUNG LOBECTOMY/SEGMENTECTOMY RIGHT (SPECIFY SITE) SURGICAL PATHOLOGY EXAM Stephan Bronson MD 1/22/2024 1516   8 : RIGHT UPPER LOBE LOBECTOMY Tissue LUNG LOBECTOMY/SEGMENTECTOMY RIGHT (SPECIFY SITE) SURGICAL PATHOLOGY EXAM Stephan Bronson MD 1/22/2024 1519   9 : LEVEL 4R LYMPH NODE Tissue LYMPH NODE PULMONARY (SPECIFY SITE) SURGICAL PATHOLOGY EXAM Stephan Bronson MD 1/22/2024 1526       Stephan Bronson  Phone Number: [phone]

## 2024-01-22 NOTE — ANESTHESIA PREPROCEDURE EVALUATION
Patient: Tao Renee    Procedure Information       Date/Time: 01/22/24 1100    Procedures:       Thoracoscopic Lung Wedge Resection-Robot Assisted Bronchoscopy, Thoracoscopic/ Robotic right Upper lobe lobectomy, Mediastinal lymph node dissection, right lower lobe wedge, possible thoracotomy (Right: Chest)      Resection Thoracoscopy Lung Lobe-Robot Assisted Bronchoscopy, Thoracoscopic/ Robotic right Upper Lobe lobectomy, mediastinal lymph node dissection, right lower lobe wedge, possible thoracotomy (Right: Chest)    Location: [facility] OR 14 / [facility] OR    Surgeons: Stephan Bronson MD            Relevant Problems   Anesthesia (within normal limits)      Cardiovascular  Cleared by cards for thoracic surgery, echo showed EF for 55-60%, no signs of ischemia  Thoracic aneurysm increased in size to 4.5cm on recent echo (was 3.9cm), fu in 6months per cards      (+) Arteriosclerosis of coronary artery   (+) HTN (hypertension)   (+) Hyperlipidemia   (+) Thoracic aortic aneurysm (TAA) (CMS/HCC)      Endocrine (within normal limits)      GI   (+) Gastroesophageal reflux disease      /Renal (within normal limits)      Neuro/Psych  Bell's palsy in early 2000's   (+) Anxiety   (+) Peripheral neuropathy      Pulmonary  PFT's showed normal FVC and DLCO   (+) Adenocarcinoma of right lung (CMS/HCC)   (+) SAURABH (obstructive sleep apnea)   (+) Pulmonary nodules/lesions, multiple      GI/Hepatic (within normal limits)      Hematology (within normal limits)      Musculoskeletal   (+) Osteoarthritis of spine with radiculopathy, lumbar region      Infectious Disease (within normal limits)       Clinical information reviewed:   Tobacco  Allergies  Meds   Med Hx  Surg Hx   Fam Hx  Soc Hx        NPO Detail:  NPO/Void Status  Date of Last Liquid: 01/21/24  Time of Last Liquid: 1900  Date of Last Solid: 01/21/24  Time of Last Solid: 1900         Physical Exam    Airway  Mallampati: II  TM distance: >3 FB  Neck ROM:  full     Cardiovascular   Rhythm: regular  Rate: normal     Dental - normal exam     Pulmonary - normal exam     Abdominal - normal exam           Anesthesia Plan    History of general anesthesia?: yes  History of complications of general anesthesia?: no    ASA 3     general     The patient is not a current smoker.  Patient was not previously instructed to abstain from smoking on day of procedure.  Patient did not smoke on day of procedure.    Anesthetic plan and risks discussed with patient.  Use of blood products discussed with patient who consented to blood products.    Plan discussed with attending.

## 2024-01-23 ENCOUNTER — APPOINTMENT (OUTPATIENT)
Dept: RADIOLOGY | Facility: HOSPITAL | Age: 74
DRG: 164 | End: 2024-01-23
Payer: MEDICARE

## 2024-01-23 LAB
ANION GAP SERPL CALC-SCNC: 16 MMOL/L (ref 10–20)
BUN SERPL-MCNC: 15 MG/DL (ref 6–23)
CALCIUM SERPL-MCNC: 8.9 MG/DL (ref 8.6–10.6)
CHLORIDE SERPL-SCNC: 102 MMOL/L (ref 98–107)
CO2 SERPL-SCNC: 21 MMOL/L (ref 21–32)
CREAT SERPL-MCNC: 1.13 MG/DL (ref 0.5–1.3)
EGFRCR SERPLBLD CKD-EPI 2021: 68 ML/MIN/1.73M*2
ERYTHROCYTE [DISTWIDTH] IN BLOOD BY AUTOMATED COUNT: 13.6 % (ref 11.5–14.5)
GLUCOSE SERPL-MCNC: 100 MG/DL (ref 74–99)
HCT VFR BLD AUTO: 45 % (ref 41–52)
HGB BLD-MCNC: 14.5 G/DL (ref 13.5–17.5)
MAGNESIUM SERPL-MCNC: 2.02 MG/DL (ref 1.6–2.4)
MCH RBC QN AUTO: 29.5 PG (ref 26–34)
MCHC RBC AUTO-ENTMCNC: 32.2 G/DL (ref 32–36)
MCV RBC AUTO: 92 FL (ref 80–100)
NRBC BLD-RTO: 0 /100 WBCS (ref 0–0)
PLATELET # BLD AUTO: 232 X10*3/UL (ref 150–450)
POTASSIUM SERPL-SCNC: 4.2 MMOL/L (ref 3.5–5.3)
RBC # BLD AUTO: 4.92 X10*6/UL (ref 4.5–5.9)
SODIUM SERPL-SCNC: 135 MMOL/L (ref 136–145)
WBC # BLD AUTO: 15.1 X10*3/UL (ref 4.4–11.3)

## 2024-01-23 PROCEDURE — 2500000004 HC RX 250 GENERAL PHARMACY W/ HCPCS (ALT 636 FOR OP/ED)

## 2024-01-23 PROCEDURE — 83735 ASSAY OF MAGNESIUM: CPT | Performed by: STUDENT IN AN ORGANIZED HEALTH CARE EDUCATION/TRAINING PROGRAM

## 2024-01-23 PROCEDURE — 85027 COMPLETE CBC AUTOMATED: CPT | Performed by: STUDENT IN AN ORGANIZED HEALTH CARE EDUCATION/TRAINING PROGRAM

## 2024-01-23 PROCEDURE — 97165 OT EVAL LOW COMPLEX 30 MIN: CPT | Mod: GO

## 2024-01-23 PROCEDURE — 2500000004 HC RX 250 GENERAL PHARMACY W/ HCPCS (ALT 636 FOR OP/ED): Performed by: STUDENT IN AN ORGANIZED HEALTH CARE EDUCATION/TRAINING PROGRAM

## 2024-01-23 PROCEDURE — 71045 X-RAY EXAM CHEST 1 VIEW: CPT

## 2024-01-23 PROCEDURE — 97161 PT EVAL LOW COMPLEX 20 MIN: CPT | Mod: GP | Performed by: STUDENT IN AN ORGANIZED HEALTH CARE EDUCATION/TRAINING PROGRAM

## 2024-01-23 PROCEDURE — 2500000005 HC RX 250 GENERAL PHARMACY W/O HCPCS: Performed by: STUDENT IN AN ORGANIZED HEALTH CARE EDUCATION/TRAINING PROGRAM

## 2024-01-23 PROCEDURE — 71045 X-RAY EXAM CHEST 1 VIEW: CPT | Performed by: RADIOLOGY

## 2024-01-23 PROCEDURE — 2500000001 HC RX 250 WO HCPCS SELF ADMINISTERED DRUGS (ALT 637 FOR MEDICARE OP): Performed by: PHYSICIAN ASSISTANT

## 2024-01-23 PROCEDURE — 80048 BASIC METABOLIC PNL TOTAL CA: CPT | Performed by: STUDENT IN AN ORGANIZED HEALTH CARE EDUCATION/TRAINING PROGRAM

## 2024-01-23 PROCEDURE — 2500000001 HC RX 250 WO HCPCS SELF ADMINISTERED DRUGS (ALT 637 FOR MEDICARE OP): Performed by: STUDENT IN AN ORGANIZED HEALTH CARE EDUCATION/TRAINING PROGRAM

## 2024-01-23 PROCEDURE — 36415 COLL VENOUS BLD VENIPUNCTURE: CPT | Performed by: STUDENT IN AN ORGANIZED HEALTH CARE EDUCATION/TRAINING PROGRAM

## 2024-01-23 PROCEDURE — 99232 SBSQ HOSP IP/OBS MODERATE 35: CPT | Performed by: PHYSICIAN ASSISTANT

## 2024-01-23 PROCEDURE — 1200000002 HC GENERAL ROOM WITH TELEMETRY DAILY

## 2024-01-23 PROCEDURE — 94640 AIRWAY INHALATION TREATMENT: CPT

## 2024-01-23 PROCEDURE — 2500000002 HC RX 250 W HCPCS SELF ADMINISTERED DRUGS (ALT 637 FOR MEDICARE OP, ALT 636 FOR OP/ED): Performed by: STUDENT IN AN ORGANIZED HEALTH CARE EDUCATION/TRAINING PROGRAM

## 2024-01-23 PROCEDURE — 2500000001 HC RX 250 WO HCPCS SELF ADMINISTERED DRUGS (ALT 637 FOR MEDICARE OP): Performed by: NURSE PRACTITIONER

## 2024-01-23 RX ORDER — OXYCODONE HYDROCHLORIDE 5 MG/1
10 TABLET ORAL EVERY 4 HOURS PRN
OUTPATIENT
Start: 2024-01-23

## 2024-01-23 RX ORDER — PANTOPRAZOLE SODIUM 20 MG/1
20 TABLET, DELAYED RELEASE ORAL
Status: DISCONTINUED | OUTPATIENT
Start: 2024-01-24 | End: 2024-01-29 | Stop reason: HOSPADM

## 2024-01-23 RX ORDER — OXYCODONE HYDROCHLORIDE 5 MG/1
5 TABLET ORAL EVERY 4 HOURS PRN
OUTPATIENT
Start: 2024-01-23

## 2024-01-23 RX ORDER — PANTOPRAZOLE SODIUM 40 MG/1
40 TABLET, DELAYED RELEASE ORAL
Status: DISCONTINUED | OUTPATIENT
Start: 2024-01-23 | End: 2024-01-23

## 2024-01-23 RX ORDER — TAMSULOSIN HYDROCHLORIDE 0.4 MG/1
0.4 CAPSULE ORAL DAILY
Status: DISCONTINUED | OUTPATIENT
Start: 2024-01-23 | End: 2024-01-23

## 2024-01-23 RX ORDER — OXYCODONE HYDROCHLORIDE 5 MG/1
5 TABLET ORAL EVERY 4 HOURS PRN
Status: DISCONTINUED | OUTPATIENT
Start: 2024-01-23 | End: 2024-01-29 | Stop reason: HOSPADM

## 2024-01-23 RX ORDER — OXYCODONE HYDROCHLORIDE 5 MG/1
10 TABLET ORAL EVERY 4 HOURS PRN
Status: DISCONTINUED | OUTPATIENT
Start: 2024-01-23 | End: 2024-01-29 | Stop reason: HOSPADM

## 2024-01-23 RX ORDER — ALPRAZOLAM 0.25 MG/1
0.5 TABLET ORAL 2 TIMES DAILY PRN
Status: DISCONTINUED | OUTPATIENT
Start: 2024-01-23 | End: 2024-01-29 | Stop reason: HOSPADM

## 2024-01-23 RX ORDER — METOPROLOL TARTRATE 25 MG/1
25 TABLET, FILM COATED ORAL 2 TIMES DAILY
Status: DISCONTINUED | OUTPATIENT
Start: 2024-01-23 | End: 2024-01-29 | Stop reason: HOSPADM

## 2024-01-23 RX ADMIN — SENNOSIDES AND DOCUSATE SODIUM 2 TABLET: 8.6; 5 TABLET ORAL at 20:04

## 2024-01-23 RX ADMIN — ACETAMINOPHEN 650 MG: 325 TABLET ORAL at 09:13

## 2024-01-23 RX ADMIN — METOPROLOL TARTRATE 5 MG: 1 INJECTION, SOLUTION INTRAVENOUS at 09:14

## 2024-01-23 RX ADMIN — ACETAMINOPHEN 650 MG: 325 TABLET ORAL at 11:49

## 2024-01-23 RX ADMIN — METOPROLOL TARTRATE 25 MG: 25 TABLET, FILM COATED ORAL at 20:04

## 2024-01-23 RX ADMIN — IPRATROPIUM BROMIDE AND ALBUTEROL SULFATE 3 ML: .5; 3 SOLUTION RESPIRATORY (INHALATION) at 14:09

## 2024-01-23 RX ADMIN — PANTOPRAZOLE SODIUM 40 MG: 40 TABLET, DELAYED RELEASE ORAL at 09:14

## 2024-01-23 RX ADMIN — SENNOSIDES AND DOCUSATE SODIUM 2 TABLET: 8.6; 5 TABLET ORAL at 09:14

## 2024-01-23 RX ADMIN — HEPARIN SODIUM 5000 UNITS: 5000 INJECTION INTRAVENOUS; SUBCUTANEOUS at 11:49

## 2024-01-23 RX ADMIN — ACETAMINOPHEN 650 MG: 325 TABLET ORAL at 23:55

## 2024-01-23 RX ADMIN — OXYCODONE HYDROCHLORIDE 10 MG: 5 TABLET ORAL at 16:20

## 2024-01-23 RX ADMIN — IPRATROPIUM BROMIDE AND ALBUTEROL SULFATE 3 ML: .5; 3 SOLUTION RESPIRATORY (INHALATION) at 10:33

## 2024-01-23 RX ADMIN — HEPARIN SODIUM 5000 UNITS: 5000 INJECTION INTRAVENOUS; SUBCUTANEOUS at 02:41

## 2024-01-23 RX ADMIN — HEPARIN SODIUM 5000 UNITS: 5000 INJECTION INTRAVENOUS; SUBCUTANEOUS at 20:05

## 2024-01-23 RX ADMIN — METOPROLOL TARTRATE 5 MG: 1 INJECTION, SOLUTION INTRAVENOUS at 02:41

## 2024-01-23 RX ADMIN — METOPROLOL TARTRATE 25 MG: 25 TABLET, FILM COATED ORAL at 11:49

## 2024-01-23 RX ADMIN — IPRATROPIUM BROMIDE AND ALBUTEROL SULFATE 3 ML: .5; 3 SOLUTION RESPIRATORY (INHALATION) at 04:06

## 2024-01-23 RX ADMIN — ALPRAZOLAM 0.5 MG: 0.25 TABLET ORAL at 23:52

## 2024-01-23 RX ADMIN — IPRATROPIUM BROMIDE AND ALBUTEROL SULFATE 3 ML: .5; 3 SOLUTION RESPIRATORY (INHALATION) at 21:15

## 2024-01-23 RX ADMIN — ACETAMINOPHEN 650 MG: 325 TABLET ORAL at 02:41

## 2024-01-23 RX ADMIN — OXYCODONE HYDROCHLORIDE 10 MG: 5 TABLET ORAL at 23:52

## 2024-01-23 ASSESSMENT — ACTIVITIES OF DAILY LIVING (ADL)
ADL_ASSISTANCE: INDEPENDENT
ADL_ASSISTANCE: INDEPENDENT

## 2024-01-23 ASSESSMENT — COGNITIVE AND FUNCTIONAL STATUS - GENERAL
MOBILITY SCORE: 24
WALKING IN HOSPITAL ROOM: A LITTLE
DAILY ACTIVITIY SCORE: 24
MOBILITY SCORE: 24
CLIMB 3 TO 5 STEPS WITH RAILING: A LITTLE
MOBILITY SCORE: 22

## 2024-01-23 ASSESSMENT — PAIN SCALES - GENERAL
PAINLEVEL_OUTOF10: 6
PAINLEVEL_OUTOF10: 0 - NO PAIN
PAINLEVEL_OUTOF10: 7
PAINLEVEL_OUTOF10: 0 - NO PAIN

## 2024-01-23 ASSESSMENT — PAIN - FUNCTIONAL ASSESSMENT
PAIN_FUNCTIONAL_ASSESSMENT: 0-10

## 2024-01-23 NOTE — PROGRESS NOTES
"Thoracic Surgery Progress Note  1/23/2024    Tao Renee is a 73 y.o. male 1 Day Post-Op status post robotic right upper lobectomy.    Overnight issues: transferred to [place] [id number] from PACU    Physical Exam:  General: He is a pleasant male currently in no distress.  BP (!) 115/49 (BP Location: Right arm, Patient Position: Lying)   Pulse 76   Temp 36.2 °C (97.2 °F) (Temporal)   Resp 18   Ht 1.905 m (6' 3\")   Wt 113 kg (248 lb 14.4 oz)   SpO2 97%   BMI 31.11 kg/m²    Body mass index is 31.11 kg/m².   HEENT: Normocephalic and atraumatic.   NECK: Trach midline. No JVD.   CHEST: Breathing comfortably on room air. Lungs diminished in bases bilat. With some basilar rhonci bilat;  Chest tube with 400ml drainage since OR, +air leak  HEART: Regular rate and rhythm. NSR with rate in 80s per tele review.   ABDOMEN: Soft, ND, nontender, +BS   NEUROLOGIC: Alert and oriented. Grossly intact.   EXTREMITIES: Moves all extremities equally. No lower extremity edema. No calf tenderness.       Diagnostics:   Results for orders placed or performed during the hospital encounter of 01/22/24 (from the past 24 hour(s))   CBC   Result Value Ref Range    WBC 15.1 (H) 4.4 - 11.3 x10*3/uL    nRBC 0.0 0.0 - 0.0 /100 WBCs    RBC 4.92 4.50 - 5.90 x10*6/uL    Hemoglobin 14.5 13.5 - 17.5 g/dL    Hematocrit 45.0 41.0 - 52.0 %    MCV 92 80 - 100 fL    MCH 29.5 26.0 - 34.0 pg    MCHC 32.2 32.0 - 36.0 g/dL    RDW 13.6 11.5 - 14.5 %    Platelets 232 150 - 450 x10*3/uL   Basic metabolic panel   Result Value Ref Range    Glucose 100 (H) 74 - 99 mg/dL    Sodium 135 (L) 136 - 145 mmol/L    Potassium 4.2 3.5 - 5.3 mmol/L    Chloride 102 98 - 107 mmol/L    Bicarbonate 21 21 - 32 mmol/L    Anion Gap 16 10 - 20 mmol/L    Urea Nitrogen 15 6 - 23 mg/dL    Creatinine 1.13 0.50 - 1.30 mg/dL    eGFR 68 >60 mL/min/1.73m*2    Calcium 8.9 8.6 - 10.6 mg/dL   Magnesium   Result Value Ref Range    Magnesium 2.02 1.60 - 2.40 mg/dL       Scheduled " medications  acetaminophen, 650 mg, oral, q6h  heparin (porcine), 5,000 Units, subcutaneous, q8h  ipratropium-albuteroL, 3 mL, nebulization, q6h  metoprolol tartrate, 25 mg, oral, BID  pantoprazole, 40 mg, oral, Daily before breakfast  sennosides-docusate sodium, 2 tablet, oral, BID      Continuous medications     PRN medications  PRN medications: naloxone, naloxone, oxygen    Imaging:   XR chest 1 view 01/22/2024    Narrative  Interpreted By:  Sharan Escobar and Ritchie Brandon  STUDY:  XR CHEST 1 VIEW;  1/22/2024 4:46 pm    INDICATION:  Signs/Symptoms:Evaluate for pneumothorax s/p right upper lobectomy  and right lower superior segmentectomy.    COMPARISON:  CT of the chest 12/08/2023    ACCESSION NUMBER(S):  [id number]    ORDERING CLINICIAN:  JOSI ROMERO    FINDINGS:  AP radiograph of the chest was provided.    Postsurgical changes of right upper lobe lobectomy with surgical  drain projecting over the right lung apex.    CARDIOMEDIASTINAL SILHOUETTE:  Cardiomediastinal silhouette is normal in size and configuration.    LUNGS:  There is elevation of the right hemidiaphragm, with right perihilar  atelectasis. Small right apical pneumothorax. No pleural effusion.  Are clear.    ABDOMEN:  No remarkable upper abdominal findings.    BONES:  No acute osseous changes.    Impression  1. Status post right upper lobe lobectomy with small right apical  pneumothorax and right sided surgical drain as described above.  2. Elevation of the right hemidiaphragm with right perihilar  atelectasis.    I personally reviewed the images/study and I agree with the findings  as stated by resident Arnoldo Anaya. This study was interpreted  at [place], [place].    MACRO:  Arnoldo Anaya discussed the significance and urgency of this  critical finding secure chat/epic chat with Dr. JOSI ROMERO on  1/22/2024 at 5:02 pm.  (**-RCF-**) Findings:  See findings.    Signed by: Sharan Escobar  1/22/2024 5:05 PM  Dictation workstation:   [id number]     Assessment:  Tao Renee is a 73 y.o. male 1 Day Post-Op status post robotic right upper lobectomy.    Plan:  Neurology: post operative pain  -Discontinue PCA pump  -start PO analgesics  -Bowel regimen available for constipation secondary to pain medication   -Out of bed to the chair throughout the day  -Encourage ambulation as tolerated    Cardiovascular: h/o HTN, HLD, CAD,  -Continue telemetry  -cont Q4h VS  -change IV Metoprolol to metoprolol 25mg BID for post operative arrhythmia prophylaxis   -hold home regimen of Lisinopril in immediate post-op setting  -Replace electrolytes as needed for K >4, Mg >2    Pulmonology: post op atelectasis, chest tube management - +air leak  -Encourage incentive spirometer use every hour  -Continue pulmonary hygiene  -Maintain chest tube to WS  -Obtain daily CXR  -Monitor and record chest tube drainage every shift    Gastrointestinal: h/o GERD  -advance to Regular diet as tolerated  -Zofran available for nausea  -scheduled colace, PRN bowel regimen  -cont PPI    Genitourinary: h/o BPH  -Continue to monitor daily electrolytes with routine BMPs   -monitor I's and O's closely  -start Flomax    Infectious Disease: afebrile; no leukocytosis  -Continue to trend daily temperatures and WBC count to monitor for signs of post-operative infection   -Monitor surgical incisions for signs of infection   -Perioperative antibiotics completed     Hematology/DVT Prophylaxis:   -Continue subcutaneous heparin and SCDs, early ambulation:   -Monitor for signs of acute blood loss  -Trend CBCs     Psych: h/o anxiety  -resume home Alprazolam    Disposition:  -Plan for discharge to home once CT removed and pt medically stable  -cont to assess home needs     Patient seen and examined by this provider.

## 2024-01-23 NOTE — PROGRESS NOTES
"Tao Renee is a 73 y.o. male on day 1 of admission presenting with Adenocarcinoma of right lung (CMS/HCC).  Met with patient to introduce myself, role and discuss discharge planning.  Patient lives with spouse.  Independent in all adl's.  Requires no assist devices for mobility.  Patient denies active home care, but is agreeable to home care per recommendations.  Patient expressed no questions and no social work needs.    PCP:  Rubio Tariq  Pharmacy:  [facility]  DME:  Medicare A/A- [facility]  Home Care:  N/A        Physical Exam    Last Recorded Vitals  Blood pressure (!) 115/49, pulse 76, temperature 36.2 °C (97.2 °F), temperature source Temporal, resp. rate 18, height 1.905 m (6' 3\"), weight 113 kg (248 lb 14.4 oz), SpO2 97 %.  Intake/Output last 3 Shifts:  I/O last 3 completed shifts:  In: - (0 mL/kg)   Out: 1210 (10.7 mL/kg) [Urine:810 (0.2 mL/kg/hr); Chest Tube:400]  Weight: 112.9 kg       Assessment/Plan   Principal Problem:    Adenocarcinoma of right lung (CMS/HCC)  Active Problems:    Gastroesophageal reflux disease    SAURABH (obstructive sleep apnea)            Galileo Joel RN      "

## 2024-01-23 NOTE — PROGRESS NOTES
Occupational Therapy    Evaluation    Patient Name: Tao Renee  MRN: [id number]  Today's Date: 1/23/2024  Time Calculation  Start Time: 1210  Stop Time: 1225  Time Calculation (min): 15 min        Assessment:  OT Assessment: Tao is a 72yo male presenting with no needs for skilled OT intervetion at this time.Demos I with ADLs, and good functional activity tolerance and tranfers  Prognosis: Excellent  Barriers to Discharge: None  Evaluation/Treatment Tolerance: Patient tolerated treatment well  Medical Staff Made Aware: Yes  End of Session Communication: Bedside nurse  End of Session Patient Position: Up in chair, Alarm off, not on at start of session  Prognosis: Excellent  Barriers to Discharge: None  Evaluation/Treatment Tolerance: Patient tolerated treatment well  Medical Staff Made Aware: Yes  Strengths: Ability to acquire knowledge, Attitude of self, Housing layout, Insight into problems, Premorbid level of function, Living arrangement secure, Leisure activity, Support of Caregivers  Plan:  No Skilled OT: Independent with ADLs  OT Frequency: OT eval only  OT Discharge Recommendations: No further acute OT  OT Recommended Transfer Status: Stand by assist  OT - OK to Discharge: Yes       Subjective   Current Problem:  1. Adenocarcinoma of right lung (CMS/HCC)  Surgical Pathology Exam    Surgical Pathology Exam      2. Pulmonary nodules/lesions, multiple [R91.8]          General:  General  Reason for Referral: Post-Op status post robotic right upper lobectomy.  Past Medical History Relevant to Rehab: Arteriosclerosis of coronary artery,HTN, Hyperlipidemia, Thoracic aortic aneurysm  Prior to Session Communication: Bedside nurse  Patient Position Received: Up in chair, Alarm off, not on at start of session  Preferred Learning Style: auditory, verbal  General Comment: Pt sitting up in chair upon entry to room. Pt pleasant and willing to work with OT. Pt reporting he is eager to walk again  today  Precautions:  Medical Precautions: Chest tube  Vital Signs:     Pain:  Pain Assessment  Pain Assessment: 0-10  Pain Score: 0 - No pain    Objective   Cognition:  Overall Cognitive Status: Within Functional Limits  Orientation Level: Oriented X4           Home Living:  Type of Home: House  Lives With: Alone  Home Adaptive Equipment: None  Home Layout: One level, Laundry in basement  Home Access: Stairs to enter with rails  Entrance Stairs-Number of Steps: 3  Bathroom Shower/Tub: Tub/shower unit  Bathroom Toilet: Standard  Bathroom Equipment: Grab bars in shower  Prior Function:  Level of [place]: Independent with ADLs and functional transfers, Independent with homemaking with ambulation  ADL Assistance: Independent  Homemaking Assistance: Independent  Ambulatory Assistance: Independent  Vocational: Part time employment  Leisure: [place]  Prior Function Comments: works at [employer]  IADL History:     ADL:  LE Dressing Assistance: Independent  ADL Comments: reports has toileted since surgery with no difficulty  Activity Tolerance:  Endurance: Endurance does not limit participation in activity  Bed Mobility/Transfers:      Transfers  Transfer: Yes  Transfer 1  Transfer From 1: Sit to, Stand to  Transfer to 1: Sit, Stand  Technique 1: Sit to stand, Stand to sit  Transfer Level of Assistance 1: Close supervision      Ambulation/Gait Training:  Ambulation/Gait Training  Ambulation/Gait Training Performed: Yes  Ambulation/Gait Training 1  Comments/Distance (ft) 1: functional mobility in liu x5 minutes 3x150, fxnl mobility in room to retrieve items from bag with I   Modalities:     Vision:Vision - Basic Assessment  Current Vision: No visual deficits  Sensation:  Light Touch: No apparent deficits  Strength:     Perception:     Coordination:  Movements are Fluid and Coordinated: Yes   Hand Function:     Extremities: RUE   RUE : Within Functional Limits and LUE   LUE: Within Functional Limits        Outcome  Measures:[facility] Daily Activity  Putting on and taking off regular lower body clothing: None  Bathing (including washing, rinsing, drying): None  Putting on and taking off regular upper body clothing: None  Toileting, which includes using toilet, bedpan or urinal: None  Taking care of personal grooming such as brushing teeth: None  Eating Meals: None  Daily Activity - Total Score: 24         and OT Adult Other Outcome Measures  4AT: 0, negative    Education Documentation  Body Mechanics, taught by Marie Houser OT at 1/23/2024  1:18 PM.  Learner: Patient  Readiness: Acceptance  Method: Explanation  Response: Verbalizes Understanding  Comment: education on chest tube precautions and holding pillow when coughing to decrease pain    ADL Training, taught by Marie Houser OT at 1/23/2024  1:18 PM.  Learner: Patient  Readiness: Acceptance  Method: Explanation  Response: Verbalizes Understanding  Comment: education on chest tube precautions and holding pillow when coughing to decrease pain    Education Comments  No comments found.      01/23/24 at 1:19 PM - Marie Houser OT

## 2024-01-23 NOTE — PROGRESS NOTES
Physical Therapy    Physical Therapy Evaluation    Patient Name: Tao Reene  MRN: [id number]  Today's Date: 1/23/2024   Time Calculation  Start Time: 1110  Stop Time: 1125  Time Calculation (min): 15 min    Assessment/Plan   PT Assessment  End of Session Communication: Bedside nurse  End of Session Patient Position: Up in chair  IP OR SWING BED PT PLAN  Inpatient or Swing Bed: Inpatient  PT Plan  PT Plan: PT Eval only  PT Eval Only Reason: No acute PT needs identified  PT Frequency: PT eval only  PT Discharge Recommendations: No PT needed after discharge  PT Recommended Transfer Status: Independent  PT - OK to Discharge: Yes    Subjective     General Visit Information:  Subjective: Patient is alert, agreeable to PT.  In good spirits and feels better after eating a meal today.  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    Reason for Referral: Post-Op status post robotic right upper lobectomy.  Past Medical History Relevant to Rehab: Arteriosclerosis of coronary artery,HTN, Hyperlipidemia, Thoracic aortic aneurysm  Prior to Session Communication: Bedside nurse  Patient Position Received: Up in chair, Alarm off, not on at start of session   Home Living:  Home Living  Type of Home: House  Lives With: Alone  Home Adaptive Equipment: None  Home Layout: One level, Laundry in basement  Home Access: Stairs to enter with rails  Bathroom Shower/Tub: Tub/shower unit  Bathroom Toilet: Standard  Bathroom Equipment: Grab bars in shower  Prior Level of Function:  Prior Function Per Pt/Caregiver Report  Level of [place]: Independent with ADLs and functional transfers, Independent with homemaking with ambulation  ADL Assistance: Independent  Homemaking Assistance: Independent  Ambulatory Assistance: Independent  Vocational: Part time employment  Leisure: [employer]  Prior Function Comments: works at [employer]  Precautions:  Precautions  Medical Precautions: Fall precautions, Chest tube  Vital  Signs:  Vital Signs  SpO2: 97 % (post 98)  Medical Gas Therapy: None (Room air)    Lines/Tubes/Drains:  Chest Tube 1 Right Pleural 28 Fr (Active)   Number of days: 1     Continuous Medications/Drips:       Objective   Pain:  Pain Assessment  Pain Score: 0 - No pain (post 0, 3 c coughing)    Cognition:  Cognition  Overall Cognitive Status: Within Functional Limits  Orientation Level: Oriented X4    General Assessments:  Extremity/Trunk Assessments:  Tone: No abnormalities noted  Sensation  Light Touch: No apparent deficits  Coordination  Movements are Fluid and Coordinated: Yes  Upper Extremity  ROM: WFL  Strength:WFL  Lower Extremity  ROM: WFL  Strength: WFL   Sitting Static Balance Normal  Sitting Dynamic Balance Normal  Standing Static Balance Normal  Standing Dynamic Balance Normal    Functional Assessments:       Transfers  Transfer: Yes  Transfer 1  Transfer From 1: Sit to  Transfer to 1: Stand  Transfer Device 1:  (no device)  Transfer Level of Assistance 1: Close supervision  Transfers 2  Transfer From 2: Stand to  Transfer to 2: Sit  Transfer Level of Assistance 2: Close supervision  Transfers 3  Transfer From 3: Chair with arms to  Transfer to 3: Chair with arms  Transfer Level of Assistance 3: Close supervision    Ambulation/Gait Training  Ambulation/Gait Training Performed: Yes  Ambulation/Gait Training 1  Surface 1: Level tile  Device 1: No device  Assistance 1: Close supervision  Quality of Gait 1:  (WFL)  Comments/Distance (ft) 1: 20, 100              Outcome Measures:  [facility] Basic Mobility  Turning from your back to your side while in a flat bed without using bedrails: None  Moving from lying on your back to sitting on the side of a flat bed without using bedrails: None  Moving to and from bed to chair (including a wheelchair): None  Standing up from a chair using your arms (e.g. wheelchair or bedside chair): None  To walk in hospital room: A little  Climbing 3-5 steps with railing: A little  Basic  Mobility - Total Score: 22                 [place]  Sitting Balance: Steady, safe  Arises: Able without using arms  Attempts to Arise: Able to arise, one attempt  Immediate Standing Balance (First 5 Seconds): Steady without walker or other support  Standing Balance: Narrow stance without support  Nudged: Steady without walker or other support  Eyes Closed: Steady  Turned 360 Degrees: Steadiness: Steady  Turned 360 Degrees: Continuity of Steps: Continuous  Sitting Down: Safe, smooth motion  Balance Score: 16  Initiation of Gait: No hesitancy  Step Height: R Swing Foot: Right foot complete clears floor  Step Length: R Swing Foot: Passes left stance foot  Step Height: L Swing Foot: Left foot complete clears floor  Step Length: L Swing Foot: Passes right stance foot  Step Symmetry: Right and left step appear equal  Step Continuity: Steps appear continuous  Path: Straight without walking aid  Trunk: No sway, no flexion, no use of arms, no walking aid  Walking Time: Heels almost touching while walking  Gait Score: 12  Total Score: 28  Timed Up and Go Test  How many seconds did it take to complete the 5 tasks?: 11 seconds       Encounter Problems       Encounter Problems (Active)       Pain - Adult            Assessment: Patient presents s/p RUL lobectomy.  Currently independent for mobility with assist for medical acuity/device management only.  Reviewed IS and UE ROM with patient.  No further acute PT warranted at this time.  Please continue mobility during acute stay with nursing staff.  PT to sign off with no further needs.      Education Documentation  Precautions, taught by Moshe Bailey PT at 1/23/2024  1:29 PM.  Learner: Patient  Readiness: Acceptance  Method: Explanation  Response: Verbalizes Understanding    Body Mechanics, taught by Moshe Bailey PT at 1/23/2024  1:29 PM.  Learner: Patient  Readiness: Acceptance  Method: Explanation  Response: Verbalizes Understanding    Mobility Training, taught by  Moshe Bailey, PT at 1/23/2024  1:29 PM.  Learner: Patient  Readiness: Acceptance  Method: Explanation  Response: Verbalizes Understanding    Education Comments  No comments found.          01/23/24 at 1:29 PM   Moshe Bailey, PT   Rehab Office: [phone]

## 2024-01-24 ENCOUNTER — APPOINTMENT (OUTPATIENT)
Dept: RADIOLOGY | Facility: HOSPITAL | Age: 74
DRG: 164 | End: 2024-01-24
Payer: MEDICARE

## 2024-01-24 LAB
ANION GAP SERPL CALC-SCNC: 13 MMOL/L (ref 10–20)
BUN SERPL-MCNC: 18 MG/DL (ref 6–23)
CALCIUM SERPL-MCNC: 8.9 MG/DL (ref 8.6–10.6)
CHLORIDE SERPL-SCNC: 105 MMOL/L (ref 98–107)
CO2 SERPL-SCNC: 24 MMOL/L (ref 21–32)
CREAT SERPL-MCNC: 1.17 MG/DL (ref 0.5–1.3)
EGFRCR SERPLBLD CKD-EPI 2021: 65 ML/MIN/1.73M*2
ERYTHROCYTE [DISTWIDTH] IN BLOOD BY AUTOMATED COUNT: 13.7 % (ref 11.5–14.5)
GLUCOSE SERPL-MCNC: 89 MG/DL (ref 74–99)
HCT VFR BLD AUTO: 44 % (ref 41–52)
HGB BLD-MCNC: 13.7 G/DL (ref 13.5–17.5)
MAGNESIUM SERPL-MCNC: 2.24 MG/DL (ref 1.6–2.4)
MCH RBC QN AUTO: 28.5 PG (ref 26–34)
MCHC RBC AUTO-ENTMCNC: 31.1 G/DL (ref 32–36)
MCV RBC AUTO: 92 FL (ref 80–100)
NRBC BLD-RTO: 0 /100 WBCS (ref 0–0)
PLATELET # BLD AUTO: 207 X10*3/UL (ref 150–450)
POTASSIUM SERPL-SCNC: 4.4 MMOL/L (ref 3.5–5.3)
RBC # BLD AUTO: 4.8 X10*6/UL (ref 4.5–5.9)
SODIUM SERPL-SCNC: 138 MMOL/L (ref 136–145)
WBC # BLD AUTO: 11.9 X10*3/UL (ref 4.4–11.3)

## 2024-01-24 PROCEDURE — 2500000005 HC RX 250 GENERAL PHARMACY W/O HCPCS: Performed by: NURSE PRACTITIONER

## 2024-01-24 PROCEDURE — 36415 COLL VENOUS BLD VENIPUNCTURE: CPT | Performed by: STUDENT IN AN ORGANIZED HEALTH CARE EDUCATION/TRAINING PROGRAM

## 2024-01-24 PROCEDURE — 71045 X-RAY EXAM CHEST 1 VIEW: CPT

## 2024-01-24 PROCEDURE — 2500000001 HC RX 250 WO HCPCS SELF ADMINISTERED DRUGS (ALT 637 FOR MEDICARE OP): Performed by: PHYSICIAN ASSISTANT

## 2024-01-24 PROCEDURE — 2500000004 HC RX 250 GENERAL PHARMACY W/ HCPCS (ALT 636 FOR OP/ED): Performed by: NURSE PRACTITIONER

## 2024-01-24 PROCEDURE — 2500000001 HC RX 250 WO HCPCS SELF ADMINISTERED DRUGS (ALT 637 FOR MEDICARE OP): Performed by: NURSE PRACTITIONER

## 2024-01-24 PROCEDURE — 85027 COMPLETE CBC AUTOMATED: CPT | Performed by: STUDENT IN AN ORGANIZED HEALTH CARE EDUCATION/TRAINING PROGRAM

## 2024-01-24 PROCEDURE — 94640 AIRWAY INHALATION TREATMENT: CPT

## 2024-01-24 PROCEDURE — 80048 BASIC METABOLIC PNL TOTAL CA: CPT | Performed by: STUDENT IN AN ORGANIZED HEALTH CARE EDUCATION/TRAINING PROGRAM

## 2024-01-24 PROCEDURE — 2500000001 HC RX 250 WO HCPCS SELF ADMINISTERED DRUGS (ALT 637 FOR MEDICARE OP): Performed by: STUDENT IN AN ORGANIZED HEALTH CARE EDUCATION/TRAINING PROGRAM

## 2024-01-24 PROCEDURE — 2500000004 HC RX 250 GENERAL PHARMACY W/ HCPCS (ALT 636 FOR OP/ED): Performed by: STUDENT IN AN ORGANIZED HEALTH CARE EDUCATION/TRAINING PROGRAM

## 2024-01-24 PROCEDURE — 2500000002 HC RX 250 W HCPCS SELF ADMINISTERED DRUGS (ALT 637 FOR MEDICARE OP, ALT 636 FOR OP/ED): Performed by: STUDENT IN AN ORGANIZED HEALTH CARE EDUCATION/TRAINING PROGRAM

## 2024-01-24 PROCEDURE — 71045 X-RAY EXAM CHEST 1 VIEW: CPT | Performed by: RADIOLOGY

## 2024-01-24 PROCEDURE — 83735 ASSAY OF MAGNESIUM: CPT | Performed by: STUDENT IN AN ORGANIZED HEALTH CARE EDUCATION/TRAINING PROGRAM

## 2024-01-24 PROCEDURE — 1200000002 HC GENERAL ROOM WITH TELEMETRY DAILY

## 2024-01-24 PROCEDURE — 99232 SBSQ HOSP IP/OBS MODERATE 35: CPT | Performed by: NURSE PRACTITIONER

## 2024-01-24 RX ORDER — LIDOCAINE 560 MG/1
2 PATCH PERCUTANEOUS; TOPICAL; TRANSDERMAL DAILY
Status: DISCONTINUED | OUTPATIENT
Start: 2024-01-24 | End: 2024-01-29 | Stop reason: HOSPADM

## 2024-01-24 RX ORDER — METHOCARBAMOL 500 MG/1
500 TABLET, FILM COATED ORAL EVERY 8 HOURS SCHEDULED
Status: DISCONTINUED | OUTPATIENT
Start: 2024-01-24 | End: 2024-01-29 | Stop reason: HOSPADM

## 2024-01-24 RX ORDER — LIDOCAINE 560 MG/1
1 PATCH PERCUTANEOUS; TOPICAL; TRANSDERMAL DAILY
Status: DISCONTINUED | OUTPATIENT
Start: 2024-01-24 | End: 2024-01-24

## 2024-01-24 RX ORDER — TOPIRAMATE 25 MG/1
12.5 TABLET ORAL DAILY
Status: DISCONTINUED | OUTPATIENT
Start: 2024-01-24 | End: 2024-01-29 | Stop reason: HOSPADM

## 2024-01-24 RX ADMIN — IPRATROPIUM BROMIDE AND ALBUTEROL SULFATE 3 ML: .5; 3 SOLUTION RESPIRATORY (INHALATION) at 14:34

## 2024-01-24 RX ADMIN — OXYCODONE HYDROCHLORIDE 10 MG: 5 TABLET ORAL at 16:35

## 2024-01-24 RX ADMIN — TOPIRAMATE 12.5 MG: 25 TABLET, FILM COATED ORAL at 12:17

## 2024-01-24 RX ADMIN — PANTOPRAZOLE SODIUM 20 MG: 20 TABLET, DELAYED RELEASE ORAL at 14:21

## 2024-01-24 RX ADMIN — OXYCODONE HYDROCHLORIDE 10 MG: 5 TABLET ORAL at 22:14

## 2024-01-24 RX ADMIN — METHOCARBAMOL TABLETS 500 MG: 500 TABLET, COATED ORAL at 14:21

## 2024-01-24 RX ADMIN — METOPROLOL TARTRATE 25 MG: 25 TABLET, FILM COATED ORAL at 19:56

## 2024-01-24 RX ADMIN — IPRATROPIUM BROMIDE AND ALBUTEROL SULFATE 3 ML: .5; 3 SOLUTION RESPIRATORY (INHALATION) at 10:25

## 2024-01-24 RX ADMIN — HEPARIN SODIUM 5000 UNITS: 5000 INJECTION INTRAVENOUS; SUBCUTANEOUS at 03:50

## 2024-01-24 RX ADMIN — METHOCARBAMOL TABLETS 500 MG: 500 TABLET, COATED ORAL at 22:14

## 2024-01-24 RX ADMIN — IPRATROPIUM BROMIDE AND ALBUTEROL SULFATE 3 ML: .5; 3 SOLUTION RESPIRATORY (INHALATION) at 21:26

## 2024-01-24 RX ADMIN — IPRATROPIUM BROMIDE AND ALBUTEROL SULFATE 3 ML: .5; 3 SOLUTION RESPIRATORY (INHALATION) at 03:00

## 2024-01-24 RX ADMIN — HEPARIN SODIUM 5000 UNITS: 5000 INJECTION INTRAVENOUS; SUBCUTANEOUS at 18:28

## 2024-01-24 RX ADMIN — METOPROLOL TARTRATE 25 MG: 25 TABLET, FILM COATED ORAL at 08:53

## 2024-01-24 RX ADMIN — OXYCODONE HYDROCHLORIDE 10 MG: 5 TABLET ORAL at 03:50

## 2024-01-24 RX ADMIN — SENNOSIDES AND DOCUSATE SODIUM 2 TABLET: 8.6; 5 TABLET ORAL at 19:56

## 2024-01-24 RX ADMIN — ACETAMINOPHEN 650 MG: 325 TABLET ORAL at 18:28

## 2024-01-24 RX ADMIN — LIDOCAINE 1 PATCH: 4 PATCH TOPICAL at 11:36

## 2024-01-24 RX ADMIN — ACETAMINOPHEN 650 MG: 325 TABLET ORAL at 08:53

## 2024-01-24 RX ADMIN — ACETAMINOPHEN 650 MG: 325 TABLET ORAL at 11:32

## 2024-01-24 RX ADMIN — LIDOCAINE 2 PATCH: 4 PATCH TOPICAL at 18:31

## 2024-01-24 RX ADMIN — PANTOPRAZOLE SODIUM 20 MG: 20 TABLET, DELAYED RELEASE ORAL at 08:53

## 2024-01-24 RX ADMIN — HEPARIN SODIUM 5000 UNITS: 5000 INJECTION INTRAVENOUS; SUBCUTANEOUS at 08:56

## 2024-01-24 ASSESSMENT — PAIN SCALES - GENERAL: PAINLEVEL_OUTOF10: 8

## 2024-01-24 ASSESSMENT — PAIN DESCRIPTION - LOCATION: LOCATION: CHEST

## 2024-01-24 ASSESSMENT — PAIN - FUNCTIONAL ASSESSMENT
PAIN_FUNCTIONAL_ASSESSMENT: 0-10
PAIN_FUNCTIONAL_ASSESSMENT: 0-10

## 2024-01-24 ASSESSMENT — PAIN DESCRIPTION - ORIENTATION: ORIENTATION: RIGHT

## 2024-01-24 NOTE — PROGRESS NOTES
"Thoracic Surgery Progress Note  1/24/2024    Tao Renee is a 73 y.o. male 2 Days Post-Op status post Bronchoscopy, robotic right upper lobe lobectomy, mediastinal lymph node dissection, right lower lobe superior segmental resection for a recurrent right upper lobe lung cancer with Dr. Bronson on 01/22/2024.     Overnight issues: failed waterseal trial yesterday, chest tube remains to -20 cm sx.  Patient with increased c/o pain overnight.     Physical Exam:  General: He is a pleasant male currently in no distress.  /72 (BP Location: Left arm, Patient Position: Lying)   Pulse 78   Temp 36.7 °C (98.1 °F) (Temporal)   Resp 18   Ht 1.905 m (6' 3\")   Wt 112 kg (247 lb 1.6 oz)   SpO2 95%   BMI 30.89 kg/m²    Body mass index is 30.89 kg/m².   HEENT: Normocephalic and atraumatic.   NECK: Trach midline. No JVD.   CHEST: Breathing comfortably on room air. Chest tube with 300ml sero-sang drainage over past 24 hours, +air leak  HEART: Regular rate and rhythm. NSR with rate in 80s per tele review.   ABDOMEN: Soft, ND, nontender, +BS   NEUROLOGIC: Alert and oriented. Grossly intact.   EXTREMITIES: Moves all extremities equally. No lower extremity edema. No calf tenderness.     Diagnostics:   Component      Latest Ref Rng 1/9/2024 1/23/2024 1/24/2024   WBC      4.4 - 11.3 x10*3/uL 9.2  15.1 (H)  11.9 (H)    nRBC      0.0 - 0.0 /100 WBCs 0.0  0.0  0.0    RBC      4.50 - 5.90 x10*6/uL 5.81  4.92  4.80    HEMOGLOBIN      13.5 - 17.5 g/dL 16.7  14.5  13.7    HEMATOCRIT      41.0 - 52.0 % 51.3  45.0  44.0    MCV      80 - 100 fL 88  92  92    MCH      26.0 - 34.0 pg 28.7  29.5  28.5    MCHC      32.0 - 36.0 g/dL 32.6  32.2  31.1 (L)    RED CELL DISTRIBUTION WIDTH      11.5 - 14.5 % 13.5  13.6  13.7    Platelets      150 - 450 x10*3/uL 249  232  207    GLUCOSE      74 - 99 mg/dL 93  100 (H)  89    SODIUM      136 - 145 mmol/L 138  135 (L)  138    POTASSIUM      3.5 - 5.3 mmol/L 5.3  4.2  4.4    CHLORIDE      98 - 107 " mmol/L 105  102  105    Bicarbonate      21 - 32 mmol/L 25  21  24    Anion Gap      10 - 20 mmol/L 13  16  13    Blood Urea Nitrogen      6 - 23 mg/dL 13  15  18    Creatinine      0.50 - 1.30 mg/dL 1.11  1.13  1.17    EGFR      >60 mL/min/1.73m*2 70  68  65    Calcium      8.6 - 10.6 mg/dL 9.5  8.9  8.9    Protime      9.8 - 12.8 seconds 11.9      INR      0.9 - 1.1  1.1      aPTT      27 - 38 seconds 30      MAGNESIUM      1.60 - 2.40 mg/dL  2.02  2.24       Scheduled medications  acetaminophen, 650 mg, oral, q6h  heparin (porcine), 5,000 Units, subcutaneous, q8h  ipratropium-albuteroL, 3 mL, nebulization, q6h  lidocaine, 1 patch, transdermal, Daily  methocarbamol, 500 mg, oral, q8h SKYLER  metoprolol tartrate, 25 mg, oral, BID  pantoprazole, 20 mg, oral, BID AC  sennosides-docusate sodium, 2 tablet, oral, BID      Continuous medications     PRN medications  PRN medications: ALPRAZolam, naloxone, naloxone, oxyCODONE, oxyCODONE, oxygen    Imaging:   AM CXR reviewed. Expected post op changes and chest tube placement. No clinically significant pneumothorax. No formal report at this time.     1/23/24 report  1. Interval increase in size of right-sided pneumothorax, now  moderate in size, with tiny basilar component, with a right apical  chest tube in place, when compared to prior same day radiograph.  2. Similar postsurgical changes of right upper lobectomy and right  lower superior segmentectomy, with similar elevation of the right  hemidiaphragm. Mild right-greater-than-left basilar atelectasis.      Assessment:  Tao Renee is a 73 y.o. male status post Bronchoscopy, robotic right upper lobe lobectomy, mediastinal lymph node dissection, right lower lobe superior segmental resection for a recurrent right upper lobe lung cancer with Dr. Bronson on 01/22/2024.  Ongoing airleak from chest tube.    Plan:  Neurology: post operative pain, hx 2-3 ETOH drinks daily   -continue scheduled tylenol. PRN oxycodone  - add robaxin  and lidocaine patches. I am hestitant to add NSAIDS with slight (baseline) elevation of his creat 1.1.  -Bowel regimen available for constipation secondary to pain medication   -Out of bed to the chair throughout the day  -Encourage ambulation as tolerated    Cardiovascular: h/o HTN, HLD, CAD,  -Continue telemetry  -cont Q4h VS  -continue metoprolol 25mg BID for post operative arrhythmia prophylaxis   -hold home regimen of Lisinopril in immediate post-op setting  -Replace electrolytes as needed for K >4, Mg >2    Pulmonology: post op atelectasis, prev smoker-quit 2002.  chest tube management - +air leak  -Encourage incentive spirometer use every hour  -Continue pulmonary hygiene  -Maintain chest tube to -20 vm dc   -Obtain daily CXR  -Monitor and record chest tube drainage every shift    Gastrointestinal: h/o GERD  -Regular diet as tolerated  -Zofran available for nausea  -scheduled colace, PRN bowel regimen  -cont PPI    Genitourinary: h/o BPH  - voiding quantity sufficient  -Continue to monitor daily electrolytes with routine BMPs   -monitor I's and O's closely    Infectious Disease: afebrile; no leukocytosis  -Continue to trend daily temperatures and WBC count to monitor for signs of post-operative infection   -Monitor surgical incisions for signs of infection   -Perioperative antibiotics completed     Hematology/DVT Prophylaxis:   -Continue subcutaneous heparin and SCDs, early ambulation:   -Monitor for signs of acute blood loss  -Trend CBCs     Psych: h/o anxiety  -resume home topirmate  - continue  home Alprazolam    Disposition:  -Plan for discharge to home once CT removed and pt medically stable  -cont to assess home needs     Patient seen and examined by this provider, d/w Dr. Bronson.    Marcelle Hopson, APRN-CNP  Thoracic Surgery Pager [phone]

## 2024-01-25 ENCOUNTER — APPOINTMENT (OUTPATIENT)
Dept: RADIOLOGY | Facility: HOSPITAL | Age: 74
DRG: 164 | End: 2024-01-25
Payer: MEDICARE

## 2024-01-25 LAB
ANION GAP SERPL CALC-SCNC: 11 MMOL/L (ref 10–20)
BUN SERPL-MCNC: 14 MG/DL (ref 6–23)
CALCIUM SERPL-MCNC: 8.7 MG/DL (ref 8.6–10.6)
CHLORIDE SERPL-SCNC: 105 MMOL/L (ref 98–107)
CO2 SERPL-SCNC: 24 MMOL/L (ref 21–32)
CREAT SERPL-MCNC: 1.07 MG/DL (ref 0.5–1.3)
EGFRCR SERPLBLD CKD-EPI 2021: 73 ML/MIN/1.73M*2
ERYTHROCYTE [DISTWIDTH] IN BLOOD BY AUTOMATED COUNT: 13.7 % (ref 11.5–14.5)
GLUCOSE SERPL-MCNC: 99 MG/DL (ref 74–99)
HCT VFR BLD AUTO: 42.4 % (ref 41–52)
HGB BLD-MCNC: 13.6 G/DL (ref 13.5–17.5)
MAGNESIUM SERPL-MCNC: 2.07 MG/DL (ref 1.6–2.4)
MCH RBC QN AUTO: 28.8 PG (ref 26–34)
MCHC RBC AUTO-ENTMCNC: 32.1 G/DL (ref 32–36)
MCV RBC AUTO: 90 FL (ref 80–100)
NRBC BLD-RTO: 0 /100 WBCS (ref 0–0)
PLATELET # BLD AUTO: 214 X10*3/UL (ref 150–450)
POTASSIUM SERPL-SCNC: 4.3 MMOL/L (ref 3.5–5.3)
RBC # BLD AUTO: 4.73 X10*6/UL (ref 4.5–5.9)
SODIUM SERPL-SCNC: 136 MMOL/L (ref 136–145)
WBC # BLD AUTO: 11.1 X10*3/UL (ref 4.4–11.3)

## 2024-01-25 PROCEDURE — 71045 X-RAY EXAM CHEST 1 VIEW: CPT

## 2024-01-25 PROCEDURE — 2500000005 HC RX 250 GENERAL PHARMACY W/O HCPCS: Performed by: NURSE PRACTITIONER

## 2024-01-25 PROCEDURE — 36415 COLL VENOUS BLD VENIPUNCTURE: CPT | Performed by: STUDENT IN AN ORGANIZED HEALTH CARE EDUCATION/TRAINING PROGRAM

## 2024-01-25 PROCEDURE — 2500000004 HC RX 250 GENERAL PHARMACY W/ HCPCS (ALT 636 FOR OP/ED): Performed by: NURSE PRACTITIONER

## 2024-01-25 PROCEDURE — 2500000002 HC RX 250 W HCPCS SELF ADMINISTERED DRUGS (ALT 637 FOR MEDICARE OP, ALT 636 FOR OP/ED): Performed by: STUDENT IN AN ORGANIZED HEALTH CARE EDUCATION/TRAINING PROGRAM

## 2024-01-25 PROCEDURE — 85027 COMPLETE CBC AUTOMATED: CPT | Performed by: STUDENT IN AN ORGANIZED HEALTH CARE EDUCATION/TRAINING PROGRAM

## 2024-01-25 PROCEDURE — 71045 X-RAY EXAM CHEST 1 VIEW: CPT | Performed by: RADIOLOGY

## 2024-01-25 PROCEDURE — 80048 BASIC METABOLIC PNL TOTAL CA: CPT | Performed by: STUDENT IN AN ORGANIZED HEALTH CARE EDUCATION/TRAINING PROGRAM

## 2024-01-25 PROCEDURE — 2500000004 HC RX 250 GENERAL PHARMACY W/ HCPCS (ALT 636 FOR OP/ED): Performed by: STUDENT IN AN ORGANIZED HEALTH CARE EDUCATION/TRAINING PROGRAM

## 2024-01-25 PROCEDURE — 99232 SBSQ HOSP IP/OBS MODERATE 35: CPT | Performed by: NURSE PRACTITIONER

## 2024-01-25 PROCEDURE — 1200000002 HC GENERAL ROOM WITH TELEMETRY DAILY

## 2024-01-25 PROCEDURE — 2500000001 HC RX 250 WO HCPCS SELF ADMINISTERED DRUGS (ALT 637 FOR MEDICARE OP): Performed by: NURSE PRACTITIONER

## 2024-01-25 PROCEDURE — 2500000001 HC RX 250 WO HCPCS SELF ADMINISTERED DRUGS (ALT 637 FOR MEDICARE OP): Performed by: PHYSICIAN ASSISTANT

## 2024-01-25 PROCEDURE — 2500000001 HC RX 250 WO HCPCS SELF ADMINISTERED DRUGS (ALT 637 FOR MEDICARE OP): Performed by: STUDENT IN AN ORGANIZED HEALTH CARE EDUCATION/TRAINING PROGRAM

## 2024-01-25 PROCEDURE — 83735 ASSAY OF MAGNESIUM: CPT | Performed by: STUDENT IN AN ORGANIZED HEALTH CARE EDUCATION/TRAINING PROGRAM

## 2024-01-25 PROCEDURE — 94640 AIRWAY INHALATION TREATMENT: CPT

## 2024-01-25 RX ORDER — BISACODYL 10 MG/1
10 SUPPOSITORY RECTAL ONCE
Status: COMPLETED | OUTPATIENT
Start: 2024-01-25 | End: 2024-01-25

## 2024-01-25 RX ADMIN — METHOCARBAMOL TABLETS 500 MG: 500 TABLET, COATED ORAL at 14:12

## 2024-01-25 RX ADMIN — SENNOSIDES AND DOCUSATE SODIUM 2 TABLET: 8.6; 5 TABLET ORAL at 08:57

## 2024-01-25 RX ADMIN — ALPRAZOLAM 0.5 MG: 0.25 TABLET ORAL at 01:21

## 2024-01-25 RX ADMIN — IPRATROPIUM BROMIDE AND ALBUTEROL SULFATE 3 ML: .5; 3 SOLUTION RESPIRATORY (INHALATION) at 13:56

## 2024-01-25 RX ADMIN — PANTOPRAZOLE SODIUM 20 MG: 20 TABLET, DELAYED RELEASE ORAL at 08:00

## 2024-01-25 RX ADMIN — ACETAMINOPHEN 650 MG: 325 TABLET ORAL at 12:18

## 2024-01-25 RX ADMIN — IPRATROPIUM BROMIDE AND ALBUTEROL SULFATE 3 ML: .5; 3 SOLUTION RESPIRATORY (INHALATION) at 03:39

## 2024-01-25 RX ADMIN — IPRATROPIUM BROMIDE AND ALBUTEROL SULFATE 3 ML: .5; 3 SOLUTION RESPIRATORY (INHALATION) at 08:44

## 2024-01-25 RX ADMIN — IPRATROPIUM BROMIDE AND ALBUTEROL SULFATE 3 ML: .5; 3 SOLUTION RESPIRATORY (INHALATION) at 20:56

## 2024-01-25 RX ADMIN — METHOCARBAMOL TABLETS 500 MG: 500 TABLET, COATED ORAL at 05:37

## 2024-01-25 RX ADMIN — HEPARIN SODIUM 5000 UNITS: 5000 INJECTION INTRAVENOUS; SUBCUTANEOUS at 18:01

## 2024-01-25 RX ADMIN — HEPARIN SODIUM 5000 UNITS: 5000 INJECTION INTRAVENOUS; SUBCUTANEOUS at 01:21

## 2024-01-25 RX ADMIN — BISACODYL 10 MG: 10 SUPPOSITORY RECTAL at 10:53

## 2024-01-25 RX ADMIN — ALPRAZOLAM 0.5 MG: 0.25 TABLET ORAL at 22:25

## 2024-01-25 RX ADMIN — METHOCARBAMOL TABLETS 500 MG: 500 TABLET, COATED ORAL at 22:25

## 2024-01-25 RX ADMIN — ACETAMINOPHEN 650 MG: 325 TABLET ORAL at 18:02

## 2024-01-25 RX ADMIN — ACETAMINOPHEN 650 MG: 325 TABLET ORAL at 04:03

## 2024-01-25 RX ADMIN — LIDOCAINE 2 PATCH: 4 PATCH TOPICAL at 08:57

## 2024-01-25 RX ADMIN — METOPROLOL TARTRATE 25 MG: 25 TABLET, FILM COATED ORAL at 20:58

## 2024-01-25 RX ADMIN — OXYCODONE HYDROCHLORIDE 5 MG: 5 TABLET ORAL at 22:25

## 2024-01-25 RX ADMIN — PANTOPRAZOLE SODIUM 20 MG: 20 TABLET, DELAYED RELEASE ORAL at 15:32

## 2024-01-25 RX ADMIN — SENNOSIDES AND DOCUSATE SODIUM 2 TABLET: 8.6; 5 TABLET ORAL at 20:57

## 2024-01-25 RX ADMIN — METOPROLOL TARTRATE 25 MG: 25 TABLET, FILM COATED ORAL at 08:57

## 2024-01-25 RX ADMIN — OXYCODONE HYDROCHLORIDE 5 MG: 5 TABLET ORAL at 17:30

## 2024-01-25 RX ADMIN — HEPARIN SODIUM 5000 UNITS: 5000 INJECTION INTRAVENOUS; SUBCUTANEOUS at 10:20

## 2024-01-25 RX ADMIN — TOPIRAMATE 12.5 MG: 25 TABLET, FILM COATED ORAL at 10:00

## 2024-01-25 ASSESSMENT — COGNITIVE AND FUNCTIONAL STATUS - GENERAL
DAILY ACTIVITIY SCORE: 24
MOBILITY SCORE: 24

## 2024-01-25 ASSESSMENT — PAIN SCALES - GENERAL
PAINLEVEL_OUTOF10: 0 - NO PAIN
PAINLEVEL_OUTOF10: 0 - NO PAIN
PAINLEVEL_OUTOF10: 3
PAINLEVEL_OUTOF10: 6
PAINLEVEL_OUTOF10: 4
PAINLEVEL_OUTOF10: 6

## 2024-01-25 ASSESSMENT — PAIN DESCRIPTION - LOCATION
LOCATION: CHEST
LOCATION: INCISION
LOCATION: INCISION

## 2024-01-25 ASSESSMENT — PAIN - FUNCTIONAL ASSESSMENT
PAIN_FUNCTIONAL_ASSESSMENT: 0-10

## 2024-01-25 ASSESSMENT — PAIN DESCRIPTION - DESCRIPTORS
DESCRIPTORS: ACHING
DESCRIPTORS: ACHING

## 2024-01-25 ASSESSMENT — PAIN DESCRIPTION - ORIENTATION
ORIENTATION: RIGHT
ORIENTATION: RIGHT

## 2024-01-25 NOTE — PROGRESS NOTES
"Thoracic Surgery Progress Note  1/25/2024    Tao Renee is a 73 y.o. male 3 Days Post-Op status post Bronchoscopy, robotic right upper lobe lobectomy, mediastinal lymph node dissection, right lower lobe superior segmental resection for a recurrent right upper lobe lung cancer with Dr. [place] on 01/22/2024.     Overnight issues: failed waterseal trial on 1/22, chest tube remains to -20 cm sx.  Slightly improved air leak.     Physical Exam:  General: He is a pleasant male currently in no distress, sitting in the chair   /66 (BP Location: Left arm, Patient Position: Lying)   Pulse 104   Temp 35.5 °C (95.9 °F) (Temporal)   Resp 18   Ht 1.905 m (6' 3\")   Wt 113 kg (248 lb 5.6 oz)   SpO2 93%   BMI 31.04 kg/m²    Body mass index is 31.04 kg/m².   HEENT: Normocephalic and atraumatic.   NECK: Trach midline. No JVD.   CHEST: Breathing comfortably on room air. Chest tube with 250 ml sero-sang drainage over past 24 hours, +air leak, maintained to WS  HEART: Regular rate and rhythm. NSR with rate in 80s per tele review.   ABDOMEN: Soft, ND, nontender, +BS , + flatus, - BM since surgery   NEUROLOGIC: Alert and oriented. Grossly intact.   EXTREMITIES: Moves all extremities equally. No lower extremity edema. No calf tenderness.     Diagnostics:   Component      Latest Ref Rng 1/9/2024 1/23/2024 1/24/2024   WBC      4.4 - 11.3 x10*3/uL 9.2  15.1 (H)  11.9 (H)    nRBC      0.0 - 0.0 /100 WBCs 0.0  0.0  0.0    RBC      4.50 - 5.90 x10*6/uL 5.81  4.92  4.80    HEMOGLOBIN      13.5 - 17.5 g/dL 16.7  14.5  13.7    HEMATOCRIT      41.0 - 52.0 % 51.3  45.0  44.0    MCV      80 - 100 fL 88  92  92    MCH      26.0 - 34.0 pg 28.7  29.5  28.5    MCHC      32.0 - 36.0 g/dL 32.6  32.2  31.1 (L)    RED CELL DISTRIBUTION WIDTH      11.5 - 14.5 % 13.5  13.6  13.7    Platelets      150 - 450 x10*3/uL 249  232  207    GLUCOSE      74 - 99 mg/dL 93  100 (H)  89    SODIUM      136 - 145 mmol/L 138  135 (L)  138    POTASSIUM      3.5 - " 5.3 mmol/L 5.3  4.2  4.4    CHLORIDE      98 - 107 mmol/L 105  102  105    Bicarbonate      21 - 32 mmol/L 25  21  24    Anion Gap      10 - 20 mmol/L 13  16  13    Blood Urea Nitrogen      6 - 23 mg/dL 13  15  18    Creatinine      0.50 - 1.30 mg/dL 1.11  1.13  1.17    EGFR      >60 mL/min/1.73m*2 70  68  65    Calcium      8.6 - 10.6 mg/dL 9.5  8.9  8.9    Protime      9.8 - 12.8 seconds 11.9      INR      0.9 - 1.1  1.1      aPTT      27 - 38 seconds 30      MAGNESIUM      1.60 - 2.40 mg/dL  2.02  2.24       Scheduled medications  acetaminophen, 650 mg, oral, q6h  bisacodyl, 10 mg, rectal, Once  heparin (porcine), 5,000 Units, subcutaneous, q8h  ipratropium-albuteroL, 3 mL, nebulization, q6h  lidocaine, 2 patch, transdermal, Daily  methocarbamol, 500 mg, oral, q8h SKYLER  metoprolol tartrate, 25 mg, oral, BID  pantoprazole, 20 mg, oral, BID AC  sennosides-docusate sodium, 2 tablet, oral, BID  topiramate, 12.5 mg, oral, Daily      Continuous medications     PRN medications  PRN medications: ALPRAZolam, naloxone, naloxone, oxyCODONE, oxyCODONE, oxygen    Results for orders placed or performed during the hospital encounter of 01/22/24 (from the past 24 hour(s))   CBC   Result Value Ref Range    WBC 11.1 4.4 - 11.3 x10*3/uL    nRBC 0.0 0.0 - 0.0 /100 WBCs    RBC 4.73 4.50 - 5.90 x10*6/uL    Hemoglobin 13.6 13.5 - 17.5 g/dL    Hematocrit 42.4 41.0 - 52.0 %    MCV 90 80 - 100 fL    MCH 28.8 26.0 - 34.0 pg    MCHC 32.1 32.0 - 36.0 g/dL    RDW 13.7 11.5 - 14.5 %    Platelets 214 150 - 450 x10*3/uL   Basic metabolic panel   Result Value Ref Range    Glucose 99 74 - 99 mg/dL    Sodium 136 136 - 145 mmol/L    Potassium 4.3 3.5 - 5.3 mmol/L    Chloride 105 98 - 107 mmol/L    Bicarbonate 24 21 - 32 mmol/L    Anion Gap 11 10 - 20 mmol/L    Urea Nitrogen 14 6 - 23 mg/dL    Creatinine 1.07 0.50 - 1.30 mg/dL    eGFR 73 >60 mL/min/1.73m*2    Calcium 8.7 8.6 - 10.6 mg/dL   Magnesium   Result Value Ref Range    Magnesium 2.07 1.60 -  2.40 mg/dL       XR chest 1 view 01/24/2024    Narrative  Interpreted By:  Sharan Escobar and Ritchie Brandon  STUDY:  XR CHEST 1 VIEW;  1/24/2024 3:48 am    INDICATION:  Signs/Symptoms:Evaluate for pneumothorax s/p right upper lobectomy  and right lower superior segmentectomy.    COMPARISON:  Chest x-ray 01/23/2024 11:42 a.m.    ACCESSION NUMBER(S):  [id number]    ORDERING CLINICIAN:  JOSI ROMERO    FINDINGS:  AP radiograph of the chest was provided.    Stable postsurgical changes of right upper lobe right lower superior  segmentectomy. Unchanged position of surgical drain projecting over  the right lung apex.    CARDIOMEDIASTINAL SILHOUETTE:  Cardiomediastinal silhouette is stable in size and configuration.    LUNGS:  Decreased size of previously seen moderate-sized pneumothorax, now  small/apical in appearance. The left lung is unremarkable in  appearance. There is persistent elevation of the right hemidiaphragm  with right basilar atelectasis. No new pleural effusion    ABDOMEN:  No remarkable upper abdominal findings.    BONES:  No acute osseous changes.    Impression  1.  Decreased size of right-sided pneumothorax, now small/apical with  similar positioning of right-sided chest tube.  2. Similar postsurgical changes right upper lobectomy/right lower  superior segmentectomy with persistent elevation of the right  hemidiaphragm and basilar atelectasis.    I personally reviewed the images/study and I agree with the findings  as stated by resident Arnoldo Anaya. This study was interpreted  at [facility], [place], [place].    MACRO:  None    Signed by: Sharan Escobar 1/24/2024 3:32 PM  Dictation workstation:   [id number]    1/25/24 CXR  Persistent R pneumothorax     Assessment:  Tao Renee is a 73 y.o. male status post Bronchoscopy, robotic right upper lobe lobectomy, mediastinal lymph node dissection, right lower lobe superior segmental resection for a recurrent  right upper lobe lung cancer with Dr. [place] on 01/22/2024.  Ongoing airleak from chest tube. Failed water-seal trial beginning of the week.     Plan:  Neurology: post operative pain, hx 2-3 ETOH drinks daily   -Good pain control, continue scheduled Tylenol and Oxycodone as needed, monitor effectiveness and adjust dose as needed   -Continue Robaxin and Lidocaine patches  -Bowel regimen available for constipation secondary to pain medication , Bisacodyl suppository today   -Out of bed to the chair throughout the day  -Encourage ambulation as tolerated      Cardiovascular: h/o HTN, HLD, CAD on Lisinopril 2.5 mg at home   Normotensive, NSR on telemetry   -Continue telemetry  -Continue  Q4h VS  -Continue metoprolol 25mg BID for post operative arrhythmia prophylaxis   -Hold home regimen of Lisinopril in immediate post-op setting  -Replace electrolytes as needed for K >4, Mg >2    Pulmonology: post op atelectasis, prev smoker-quit 2002.  chest tube management - +air leak  -Encourage incentive spirometer use every hour  -Continue pulmonary hygiene  -Right chest tube - trial of decrease suction of - 10 cm, slightly improved air leak    -Obtain daily CXR  -Monitor and record chest tube drainage every shift  -Await pathology results     Gastrointestinal: h/o GERD  -Regular diet as tolerated  -Zofran available for nausea  -Scheduled colace, PRN bowel regimen, Bisacodyl suppository today   -Continue PPI    Genitourinary: h/o BPH  -Voiding quantity sufficient  -Continue to monitor daily electrolytes with routine BMPs   -Monitor I's and O's closely    Infectious Disease: afebrile; no leukocytosis  -Continue to trend daily temperatures and WBC count to monitor for signs of post-operative infection   -Monitor surgical incisions for signs of infection   -Perioperative antibiotics completed     Hematology/DVT Prophylaxis:   -Continue subcutaneous heparin and SCDs, early ambulation:   -Monitor for signs of acute blood loss  -Trend CBCs      Psych: h/o anxiety  -Home regimen of Topamax 12.5 gm and Alprazolam 0.5 as needed , continue home regimen     Disposition:  -Plan for discharge to home once CT removed and patient is medically stable, may consider discharge with CT if persistent air leak, in that case he will need home nursing care on discharge   -Continue  to assess home needs     Patient seen and examined by this provider, d/w with an attending physician Dr. [place].    KRISTYN Cunningham-CNP  Thoracic Surgery Pager [phone]

## 2024-01-25 NOTE — PROGRESS NOTES
"Tao Renee is a 73 y.o. male on day 3 of admission presenting with Adenocarcinoma of right lung (CMS/HCC).  Transitional Care Coordination Progress Note:   Patient discussed during interdisciplinary rounds.   Team members present: (TCC, Thoracic- NP)   Plan per Medical/Surgical team: (Thoracic surgery- chest tube air leak)   Discharge disposition: (home no needs)   Status- In patient  Payer-Medicare A/B, AARP  Potential Barriers: (chest tube removal   ADOD: (1-2 days)   .Galileo Joel RN [facility] [phone]         Physical Exam    Last Recorded Vitals  Blood pressure 104/66, pulse 104, temperature 35.5 °C (95.9 °F), temperature source Temporal, resp. rate 18, height 1.905 m (6' 3\"), weight 113 kg (248 lb 5.6 oz), SpO2 93 %.  Intake/Output last 3 Shifts:  I/O last 3 completed shifts:  In: 840 (7.5 mL/kg) [P.O.:840]  Out: 2450 (21.7 mL/kg) [Urine:2025 (0.5 mL/kg/hr); Chest Tube:425]  Weight: 112.6 kg         Assessment/Plan   Principal Problem:    Adenocarcinoma of right lung (CMS/HCC)  Active Problems:    Gastroesophageal reflux disease    SAURABH (obstructive sleep apnea)              Galileo Joel RN      "

## 2024-01-26 ENCOUNTER — APPOINTMENT (OUTPATIENT)
Dept: RADIOLOGY | Facility: HOSPITAL | Age: 74
DRG: 164 | End: 2024-01-26
Payer: MEDICARE

## 2024-01-26 LAB
ANION GAP SERPL CALC-SCNC: 11 MMOL/L (ref 10–20)
BUN SERPL-MCNC: 13 MG/DL (ref 6–23)
CALCIUM SERPL-MCNC: 9 MG/DL (ref 8.6–10.6)
CHLORIDE SERPL-SCNC: 106 MMOL/L (ref 98–107)
CO2 SERPL-SCNC: 25 MMOL/L (ref 21–32)
CREAT SERPL-MCNC: 0.98 MG/DL (ref 0.5–1.3)
EGFRCR SERPLBLD CKD-EPI 2021: 81 ML/MIN/1.73M*2
ERYTHROCYTE [DISTWIDTH] IN BLOOD BY AUTOMATED COUNT: 13.6 % (ref 11.5–14.5)
GLUCOSE SERPL-MCNC: 95 MG/DL (ref 74–99)
HCT VFR BLD AUTO: 41.7 % (ref 41–52)
HGB BLD-MCNC: 13.1 G/DL (ref 13.5–17.5)
MAGNESIUM SERPL-MCNC: 2.14 MG/DL (ref 1.6–2.4)
MCH RBC QN AUTO: 28.9 PG (ref 26–34)
MCHC RBC AUTO-ENTMCNC: 31.4 G/DL (ref 32–36)
MCV RBC AUTO: 92 FL (ref 80–100)
NRBC BLD-RTO: 0 /100 WBCS (ref 0–0)
PLATELET # BLD AUTO: 200 X10*3/UL (ref 150–450)
POTASSIUM SERPL-SCNC: 4.3 MMOL/L (ref 3.5–5.3)
RBC # BLD AUTO: 4.53 X10*6/UL (ref 4.5–5.9)
SODIUM SERPL-SCNC: 138 MMOL/L (ref 136–145)
WBC # BLD AUTO: 8.9 X10*3/UL (ref 4.4–11.3)

## 2024-01-26 PROCEDURE — 2500000004 HC RX 250 GENERAL PHARMACY W/ HCPCS (ALT 636 FOR OP/ED): Performed by: NURSE PRACTITIONER

## 2024-01-26 PROCEDURE — 2500000001 HC RX 250 WO HCPCS SELF ADMINISTERED DRUGS (ALT 637 FOR MEDICARE OP): Performed by: STUDENT IN AN ORGANIZED HEALTH CARE EDUCATION/TRAINING PROGRAM

## 2024-01-26 PROCEDURE — 71045 X-RAY EXAM CHEST 1 VIEW: CPT

## 2024-01-26 PROCEDURE — 2500000004 HC RX 250 GENERAL PHARMACY W/ HCPCS (ALT 636 FOR OP/ED): Performed by: STUDENT IN AN ORGANIZED HEALTH CARE EDUCATION/TRAINING PROGRAM

## 2024-01-26 PROCEDURE — 82435 ASSAY OF BLOOD CHLORIDE: CPT | Performed by: PHYSICIAN ASSISTANT

## 2024-01-26 PROCEDURE — 2500000001 HC RX 250 WO HCPCS SELF ADMINISTERED DRUGS (ALT 637 FOR MEDICARE OP): Performed by: PHYSICIAN ASSISTANT

## 2024-01-26 PROCEDURE — 2500000001 HC RX 250 WO HCPCS SELF ADMINISTERED DRUGS (ALT 637 FOR MEDICARE OP): Performed by: NURSE PRACTITIONER

## 2024-01-26 PROCEDURE — 36415 COLL VENOUS BLD VENIPUNCTURE: CPT | Performed by: PHYSICIAN ASSISTANT

## 2024-01-26 PROCEDURE — 99232 SBSQ HOSP IP/OBS MODERATE 35: CPT | Performed by: NURSE PRACTITIONER

## 2024-01-26 PROCEDURE — 83735 ASSAY OF MAGNESIUM: CPT | Performed by: PHYSICIAN ASSISTANT

## 2024-01-26 PROCEDURE — 71045 X-RAY EXAM CHEST 1 VIEW: CPT | Performed by: RADIOLOGY

## 2024-01-26 PROCEDURE — 94640 AIRWAY INHALATION TREATMENT: CPT

## 2024-01-26 PROCEDURE — 1200000002 HC GENERAL ROOM WITH TELEMETRY DAILY

## 2024-01-26 PROCEDURE — 85027 COMPLETE CBC AUTOMATED: CPT | Performed by: PHYSICIAN ASSISTANT

## 2024-01-26 PROCEDURE — 2500000005 HC RX 250 GENERAL PHARMACY W/O HCPCS: Performed by: NURSE PRACTITIONER

## 2024-01-26 PROCEDURE — 2500000002 HC RX 250 W HCPCS SELF ADMINISTERED DRUGS (ALT 637 FOR MEDICARE OP, ALT 636 FOR OP/ED): Performed by: STUDENT IN AN ORGANIZED HEALTH CARE EDUCATION/TRAINING PROGRAM

## 2024-01-26 RX ADMIN — METOPROLOL TARTRATE 25 MG: 25 TABLET, FILM COATED ORAL at 20:09

## 2024-01-26 RX ADMIN — TOPIRAMATE 12.5 MG: 25 TABLET, FILM COATED ORAL at 09:02

## 2024-01-26 RX ADMIN — HEPARIN SODIUM 5000 UNITS: 5000 INJECTION INTRAVENOUS; SUBCUTANEOUS at 17:47

## 2024-01-26 RX ADMIN — IPRATROPIUM BROMIDE AND ALBUTEROL SULFATE 3 ML: .5; 3 SOLUTION RESPIRATORY (INHALATION) at 20:34

## 2024-01-26 RX ADMIN — METHOCARBAMOL TABLETS 500 MG: 500 TABLET, COATED ORAL at 21:33

## 2024-01-26 RX ADMIN — IPRATROPIUM BROMIDE AND ALBUTEROL SULFATE 3 ML: .5; 3 SOLUTION RESPIRATORY (INHALATION) at 15:21

## 2024-01-26 RX ADMIN — ACETAMINOPHEN 650 MG: 325 TABLET ORAL at 09:02

## 2024-01-26 RX ADMIN — PANTOPRAZOLE SODIUM 20 MG: 20 TABLET, DELAYED RELEASE ORAL at 07:24

## 2024-01-26 RX ADMIN — SENNOSIDES AND DOCUSATE SODIUM 2 TABLET: 8.6; 5 TABLET ORAL at 09:02

## 2024-01-26 RX ADMIN — ACETAMINOPHEN 650 MG: 325 TABLET ORAL at 02:44

## 2024-01-26 RX ADMIN — HEPARIN SODIUM 5000 UNITS: 5000 INJECTION INTRAVENOUS; SUBCUTANEOUS at 09:55

## 2024-01-26 RX ADMIN — ALPRAZOLAM 0.5 MG: 0.25 TABLET ORAL at 21:33

## 2024-01-26 RX ADMIN — METHOCARBAMOL TABLETS 500 MG: 500 TABLET, COATED ORAL at 13:11

## 2024-01-26 RX ADMIN — ACETAMINOPHEN 650 MG: 325 TABLET ORAL at 13:11

## 2024-01-26 RX ADMIN — HEPARIN SODIUM 5000 UNITS: 5000 INJECTION INTRAVENOUS; SUBCUTANEOUS at 02:44

## 2024-01-26 RX ADMIN — METHOCARBAMOL TABLETS 500 MG: 500 TABLET, COATED ORAL at 06:33

## 2024-01-26 RX ADMIN — METOPROLOL TARTRATE 25 MG: 25 TABLET, FILM COATED ORAL at 09:02

## 2024-01-26 RX ADMIN — PANTOPRAZOLE SODIUM 20 MG: 20 TABLET, DELAYED RELEASE ORAL at 15:55

## 2024-01-26 RX ADMIN — OXYCODONE HYDROCHLORIDE 5 MG: 5 TABLET ORAL at 04:28

## 2024-01-26 RX ADMIN — ACETAMINOPHEN 650 MG: 325 TABLET ORAL at 17:47

## 2024-01-26 RX ADMIN — LIDOCAINE 2 PATCH: 4 PATCH TOPICAL at 09:04

## 2024-01-26 RX ADMIN — OXYCODONE HYDROCHLORIDE 5 MG: 5 TABLET ORAL at 20:11

## 2024-01-26 RX ADMIN — IPRATROPIUM BROMIDE AND ALBUTEROL SULFATE 3 ML: .5; 3 SOLUTION RESPIRATORY (INHALATION) at 08:55

## 2024-01-26 RX ADMIN — IPRATROPIUM BROMIDE AND ALBUTEROL SULFATE 3 ML: .5; 3 SOLUTION RESPIRATORY (INHALATION) at 02:41

## 2024-01-26 ASSESSMENT — PAIN DESCRIPTION - LOCATION
LOCATION: INCISION
LOCATION: CHEST
LOCATION: INCISION

## 2024-01-26 ASSESSMENT — COGNITIVE AND FUNCTIONAL STATUS - GENERAL
DAILY ACTIVITIY SCORE: 24
MOBILITY SCORE: 24

## 2024-01-26 ASSESSMENT — PAIN SCALES - GENERAL
PAINLEVEL_OUTOF10: 4
PAINLEVEL_OUTOF10: 1
PAINLEVEL_OUTOF10: 4
PAINLEVEL_OUTOF10: 3
PAINLEVEL_OUTOF10: 3

## 2024-01-26 ASSESSMENT — PAIN - FUNCTIONAL ASSESSMENT
PAIN_FUNCTIONAL_ASSESSMENT: 0-10

## 2024-01-26 NOTE — PROGRESS NOTES
"Thoracic Surgery Progress Note  1/26/2024    Tao Renee is a 73 y.o. male 4 Days Post-Op status post Bronchoscopy, robotic right upper lobe lobectomy, mediastinal lymph node dissection, right lower lobe superior segmental resection for a recurrent right upper lobe lung cancer with Dr. [place] on 01/22/2024.     Overnight issues: failed waterseal trial on 1/22, chest tube remained to -10 cm sx overnight with satisfactory imaging, improved airleak this AM.  placed to waterseal on AM rounds.      Physical Exam:  General: He is a pleasant male currently in no distress, seen ambulating the unit and again when sitting up in the chair.   /71   Pulse 80   Temp 36.9 °C (98.4 °F) (Temporal)   Resp 18   Ht 1.905 m (6' 3\")   Wt 113 kg (248 lb 5.6 oz)   SpO2 95%   BMI 31.04 kg/m²    Body mass index is 31.04 kg/m².   HEENT: Normocephalic and atraumatic.   NECK: Trach midline. No JVD.   CHEST: Breathing comfortably on room air. Chest tube with 265 ml sero-sang drainage over past 24 hours, + improving air leak, maintained to WS  HEART: Regular rate and rhythm. NSR with rate in 80s per tele review.   ABDOMEN: Soft, ND, nontender, +BS , + flatus, - BM since surgery   NEUROLOGIC: Alert and oriented. Grossly intact.   EXTREMITIES: Moves all extremities equally. No lower extremity edema. No calf tenderness.     Diagnostics:   Component      Latest Ref Rng 1/9/2024 1/23/2024 1/24/2024   WBC      4.4 - 11.3 x10*3/uL 9.2  15.1 (H)  11.9 (H)    nRBC      0.0 - 0.0 /100 WBCs 0.0  0.0  0.0    RBC      4.50 - 5.90 x10*6/uL 5.81  4.92  4.80    HEMOGLOBIN      13.5 - 17.5 g/dL 16.7  14.5  13.7    HEMATOCRIT      41.0 - 52.0 % 51.3  45.0  44.0    MCV      80 - 100 fL 88  92  92    MCH      26.0 - 34.0 pg 28.7  29.5  28.5    MCHC      32.0 - 36.0 g/dL 32.6  32.2  31.1 (L)    RED CELL DISTRIBUTION WIDTH      11.5 - 14.5 % 13.5  13.6  13.7    Platelets      150 - 450 x10*3/uL 249  232  207    GLUCOSE      74 - 99 mg/dL 93  100 (H)  89 "    SODIUM      136 - 145 mmol/L 138  135 (L)  138    POTASSIUM      3.5 - 5.3 mmol/L 5.3  4.2  4.4    CHLORIDE      98 - 107 mmol/L 105  102  105    Bicarbonate      21 - 32 mmol/L 25  21  24    Anion Gap      10 - 20 mmol/L 13  16  13    Blood Urea Nitrogen      6 - 23 mg/dL 13  15  18    Creatinine      0.50 - 1.30 mg/dL 1.11  1.13  1.17    EGFR      >60 mL/min/1.73m*2 70  68  65    Calcium      8.6 - 10.6 mg/dL 9.5  8.9  8.9    Protime      9.8 - 12.8 seconds 11.9      INR      0.9 - 1.1  1.1      aPTT      27 - 38 seconds 30      MAGNESIUM      1.60 - 2.40 mg/dL  2.02  2.24       Scheduled medications  acetaminophen, 650 mg, oral, q6h  heparin (porcine), 5,000 Units, subcutaneous, q8h  ipratropium-albuteroL, 3 mL, nebulization, q6h  lidocaine, 2 patch, transdermal, Daily  methocarbamol, 500 mg, oral, q8h SKYLER  metoprolol tartrate, 25 mg, oral, BID  pantoprazole, 20 mg, oral, BID AC  sennosides-docusate sodium, 2 tablet, oral, BID  topiramate, 12.5 mg, oral, Daily      Continuous medications     PRN medications  PRN medications: ALPRAZolam, naloxone, naloxone, oxyCODONE, oxyCODONE, oxygen    Component      Latest Ref Rng 1/9/2024 1/23/2024 1/24/2024 1/25/2024 1/26/2024   WBC      4.4 - 11.3 x10*3/uL  15.1 (H)  11.9 (H)  11.1  8.9    nRBC      0.0 - 0.0 /100 WBCs  0.0  0.0  0.0  0.0    RBC      4.50 - 5.90 x10*6/uL  4.92  4.80  4.73  4.53    HEMOGLOBIN      13.5 - 17.5 g/dL  14.5  13.7  13.6  13.1 (L)    HEMATOCRIT      41.0 - 52.0 %  45.0  44.0  42.4  41.7    MCV      80 - 100 fL  92  92  90  92    MCH      26.0 - 34.0 pg  29.5  28.5  28.8  28.9    MCHC      32.0 - 36.0 g/dL  32.2  31.1 (L)  32.1  31.4 (L)    RED CELL DISTRIBUTION WIDTH      11.5 - 14.5 %  13.6  13.7  13.7  13.6    Platelets      150 - 450 x10*3/uL  232  207  214  200    GLUCOSE      74 - 99 mg/dL  100 (H)  89  99  95    SODIUM      136 - 145 mmol/L  135 (L)  138  136  138    POTASSIUM      3.5 - 5.3 mmol/L  4.2  4.4  4.3  4.3    CHLORIDE      98  - 107 mmol/L  102  105  105  106    Bicarbonate      21 - 32 mmol/L  21  24  24  25    Anion Gap      10 - 20 mmol/L  16  13  11  11    Blood Urea Nitrogen      6 - 23 mg/dL  15  18  14  13    Creatinine      0.50 - 1.30 mg/dL  1.13  1.17  1.07  0.98    EGFR      >60 mL/min/1.73m*2  68  65  73  81    Calcium      8.6 - 10.6 mg/dL  8.9  8.9  8.7  9.0    Hemoglobin A1C      see below % 5.6        Estimated Average Glucose      Not Established mg/dL 114        MAGNESIUM      1.60 - 2.40 mg/dL  2.02  2.24  2.07  2.14      Imaging:  Serial AM CXR reviewed. Expected post op changes and chest tube placement. No clinically significant pneumothorax. No formal report at this time.       Assessment:  Tao Renee is a 73 y.o. male status post Bronchoscopy, robotic right upper lobe lobectomy, mediastinal lymph node dissection, right lower lobe superior segmental resection for a recurrent right upper lobe lung cancer with Dr. [place] on 01/22/2024.  Ongoing airleak from chest tube. Failed water-seal trial beginning of the week, placed again to WS this am.     Plan:  Neurology: post operative pain, hx 2-3 ETOH drinks daily   -Good pain control, continue scheduled Tylenol and Oxycodone as needed, monitor effectiveness and adjust dose as needed   -Continue Robaxin and Lidocaine patches  -Bowel regimen available for constipation secondary to pain medication   -Out of bed to the chair throughout the day  -Encourage ambulation as tolerated      Cardiovascular: h/o HTN, HLD, CAD on Lisinopril 2.5 mg at home   Normotensive, NSR on telemetry   -Continue telemetry  -Continue  Q4h VS  -Continue metoprolol 25mg BID for post operative arrhythmia prophylaxis   -Hold home regimen of Lisinopril in immediate post-op setting  -Replace electrolytes as needed for K >4, Mg >2    Pulmonology: post op atelectasis, prev smoker-quit 2002.  chest tube management - +air leak  -Encourage incentive spirometer use every hour  -Continue pulmonary  hygiene  -Right chest tube -10cm sx overnight, watersealed on AM rounds. CXR this PM  -Obtain daily CXR  -Monitor and record chest tube drainage every shift  -Await pathology results     Gastrointestinal: h/o GERD  -Regular diet as tolerated  -Zofran available for nausea  -Scheduled colace, PRN bowel regimen, Bisacodyl suppository today   -Continue PPI    Genitourinary: h/o BPH  -Voiding quantity sufficient  -Continue to monitor daily electrolytes with routine BMPs   -Monitor I's and O's closely    Infectious Disease: afebrile; no leukocytosis  -Continue to trend daily temperatures and WBC count to monitor for signs of post-operative infection   -Monitor surgical incisions for signs of infection   -Perioperative antibiotics completed     Hematology/DVT Prophylaxis:   -Continue subcutaneous heparin and SCDs, early ambulation:   -Monitor for signs of acute blood loss  -Trend CBCs     Psych: h/o anxiety  -Home regimen of Topamax 12.5 gm and Alprazolam 0.5 as needed , continue home regimen     Disposition:  -Plan for discharge to home once CT removed and patient is medically stable, may consider discharge with CT if persistent air leak, in that case he will need home nursing care on discharge   -Continue  to assess home needs     Patient seen and examined by this provider, d/w with an attending physician Dr. [place].    KRISTYN Marcos-CNP  Thoracic Surgery Pager [phone]

## 2024-01-27 ENCOUNTER — APPOINTMENT (OUTPATIENT)
Dept: RADIOLOGY | Facility: HOSPITAL | Age: 74
DRG: 164 | End: 2024-01-27
Payer: MEDICARE

## 2024-01-27 PROCEDURE — 2500000001 HC RX 250 WO HCPCS SELF ADMINISTERED DRUGS (ALT 637 FOR MEDICARE OP): Performed by: PHYSICIAN ASSISTANT

## 2024-01-27 PROCEDURE — 2500000005 HC RX 250 GENERAL PHARMACY W/O HCPCS: Performed by: NURSE PRACTITIONER

## 2024-01-27 PROCEDURE — 2500000004 HC RX 250 GENERAL PHARMACY W/ HCPCS (ALT 636 FOR OP/ED): Performed by: STUDENT IN AN ORGANIZED HEALTH CARE EDUCATION/TRAINING PROGRAM

## 2024-01-27 PROCEDURE — 2500000001 HC RX 250 WO HCPCS SELF ADMINISTERED DRUGS (ALT 637 FOR MEDICARE OP): Performed by: NURSE PRACTITIONER

## 2024-01-27 PROCEDURE — 71045 X-RAY EXAM CHEST 1 VIEW: CPT | Performed by: RADIOLOGY

## 2024-01-27 PROCEDURE — 2500000001 HC RX 250 WO HCPCS SELF ADMINISTERED DRUGS (ALT 637 FOR MEDICARE OP): Performed by: STUDENT IN AN ORGANIZED HEALTH CARE EDUCATION/TRAINING PROGRAM

## 2024-01-27 PROCEDURE — 2500000002 HC RX 250 W HCPCS SELF ADMINISTERED DRUGS (ALT 637 FOR MEDICARE OP, ALT 636 FOR OP/ED): Performed by: STUDENT IN AN ORGANIZED HEALTH CARE EDUCATION/TRAINING PROGRAM

## 2024-01-27 PROCEDURE — 1200000002 HC GENERAL ROOM WITH TELEMETRY DAILY

## 2024-01-27 PROCEDURE — 71045 X-RAY EXAM CHEST 1 VIEW: CPT

## 2024-01-27 PROCEDURE — 2500000004 HC RX 250 GENERAL PHARMACY W/ HCPCS (ALT 636 FOR OP/ED): Performed by: NURSE PRACTITIONER

## 2024-01-27 PROCEDURE — 94640 AIRWAY INHALATION TREATMENT: CPT

## 2024-01-27 RX ADMIN — METHOCARBAMOL TABLETS 500 MG: 500 TABLET, COATED ORAL at 06:00

## 2024-01-27 RX ADMIN — TOPIRAMATE 12.5 MG: 25 TABLET, FILM COATED ORAL at 08:21

## 2024-01-27 RX ADMIN — LIDOCAINE 2 PATCH: 4 PATCH TOPICAL at 08:23

## 2024-01-27 RX ADMIN — ACETAMINOPHEN 650 MG: 325 TABLET ORAL at 18:11

## 2024-01-27 RX ADMIN — ALPRAZOLAM 0.5 MG: 0.25 TABLET ORAL at 22:49

## 2024-01-27 RX ADMIN — METHOCARBAMOL TABLETS 500 MG: 500 TABLET, COATED ORAL at 13:10

## 2024-01-27 RX ADMIN — IPRATROPIUM BROMIDE AND ALBUTEROL SULFATE 3 ML: .5; 3 SOLUTION RESPIRATORY (INHALATION) at 01:42

## 2024-01-27 RX ADMIN — IPRATROPIUM BROMIDE AND ALBUTEROL SULFATE 3 ML: .5; 3 SOLUTION RESPIRATORY (INHALATION) at 09:14

## 2024-01-27 RX ADMIN — ACETAMINOPHEN 650 MG: 325 TABLET ORAL at 08:21

## 2024-01-27 RX ADMIN — METHOCARBAMOL TABLETS 500 MG: 500 TABLET, COATED ORAL at 21:10

## 2024-01-27 RX ADMIN — OXYCODONE HYDROCHLORIDE 5 MG: 5 TABLET ORAL at 01:52

## 2024-01-27 RX ADMIN — HEPARIN SODIUM 5000 UNITS: 5000 INJECTION INTRAVENOUS; SUBCUTANEOUS at 01:52

## 2024-01-27 RX ADMIN — SENNOSIDES AND DOCUSATE SODIUM 2 TABLET: 8.6; 5 TABLET ORAL at 08:22

## 2024-01-27 RX ADMIN — METOPROLOL TARTRATE 25 MG: 25 TABLET, FILM COATED ORAL at 21:10

## 2024-01-27 RX ADMIN — ACETAMINOPHEN 650 MG: 325 TABLET ORAL at 13:10

## 2024-01-27 RX ADMIN — HEPARIN SODIUM 5000 UNITS: 5000 INJECTION INTRAVENOUS; SUBCUTANEOUS at 18:11

## 2024-01-27 RX ADMIN — PANTOPRAZOLE SODIUM 20 MG: 20 TABLET, DELAYED RELEASE ORAL at 15:07

## 2024-01-27 RX ADMIN — SENNOSIDES AND DOCUSATE SODIUM 2 TABLET: 8.6; 5 TABLET ORAL at 21:10

## 2024-01-27 RX ADMIN — HEPARIN SODIUM 5000 UNITS: 5000 INJECTION INTRAVENOUS; SUBCUTANEOUS at 10:26

## 2024-01-27 RX ADMIN — METOPROLOL TARTRATE 25 MG: 25 TABLET, FILM COATED ORAL at 08:21

## 2024-01-27 RX ADMIN — OXYCODONE HYDROCHLORIDE 5 MG: 5 TABLET ORAL at 17:07

## 2024-01-27 RX ADMIN — PANTOPRAZOLE SODIUM 20 MG: 20 TABLET, DELAYED RELEASE ORAL at 08:21

## 2024-01-27 RX ADMIN — IPRATROPIUM BROMIDE AND ALBUTEROL SULFATE 3 ML: .5; 3 SOLUTION RESPIRATORY (INHALATION) at 21:15

## 2024-01-27 RX ADMIN — IPRATROPIUM BROMIDE AND ALBUTEROL SULFATE 3 ML: .5; 3 SOLUTION RESPIRATORY (INHALATION) at 15:11

## 2024-01-27 RX ADMIN — OXYCODONE HYDROCHLORIDE 5 MG: 5 TABLET ORAL at 22:49

## 2024-01-27 RX ADMIN — ACETAMINOPHEN 650 MG: 325 TABLET ORAL at 01:52

## 2024-01-27 ASSESSMENT — PAIN SCALES - GENERAL
PAINLEVEL_OUTOF10: 3
PAINLEVEL_OUTOF10: 4
PAINLEVEL_OUTOF10: 0 - NO PAIN
PAINLEVEL_OUTOF10: 4
PAINLEVEL_OUTOF10: 5 - MODERATE PAIN
PAINLEVEL_OUTOF10: 6

## 2024-01-27 ASSESSMENT — PAIN - FUNCTIONAL ASSESSMENT
PAIN_FUNCTIONAL_ASSESSMENT: 0-10

## 2024-01-27 ASSESSMENT — COGNITIVE AND FUNCTIONAL STATUS - GENERAL
MOBILITY SCORE: 24
DAILY ACTIVITIY SCORE: 24
DAILY ACTIVITIY SCORE: 24
MOBILITY SCORE: 24

## 2024-01-27 ASSESSMENT — PAIN DESCRIPTION - DESCRIPTORS
DESCRIPTORS: ACHING
DESCRIPTORS: ACHING

## 2024-01-27 ASSESSMENT — PAIN DESCRIPTION - LOCATION: LOCATION: INCISION

## 2024-01-28 ENCOUNTER — APPOINTMENT (OUTPATIENT)
Dept: RADIOLOGY | Facility: HOSPITAL | Age: 74
DRG: 164 | End: 2024-01-28
Payer: MEDICARE

## 2024-01-28 LAB
ALBUMIN SERPL BCP-MCNC: 3.1 G/DL (ref 3.4–5)
ANION GAP SERPL CALC-SCNC: 13 MMOL/L (ref 10–20)
BUN SERPL-MCNC: 14 MG/DL (ref 6–23)
CALCIUM SERPL-MCNC: 8.8 MG/DL (ref 8.6–10.6)
CHLORIDE SERPL-SCNC: 106 MMOL/L (ref 98–107)
CO2 SERPL-SCNC: 21 MMOL/L (ref 21–32)
CREAT SERPL-MCNC: 0.99 MG/DL (ref 0.5–1.3)
EGFRCR SERPLBLD CKD-EPI 2021: 80 ML/MIN/1.73M*2
ERYTHROCYTE [DISTWIDTH] IN BLOOD BY AUTOMATED COUNT: 13.2 % (ref 11.5–14.5)
GLUCOSE SERPL-MCNC: 91 MG/DL (ref 74–99)
HCT VFR BLD AUTO: 39.4 % (ref 41–52)
HGB BLD-MCNC: 12.7 G/DL (ref 13.5–17.5)
MAGNESIUM SERPL-MCNC: 2.07 MG/DL (ref 1.6–2.4)
MCH RBC QN AUTO: 28.4 PG (ref 26–34)
MCHC RBC AUTO-ENTMCNC: 32.2 G/DL (ref 32–36)
MCV RBC AUTO: 88 FL (ref 80–100)
NRBC BLD-RTO: 0 /100 WBCS (ref 0–0)
PHOSPHATE SERPL-MCNC: 3.2 MG/DL (ref 2.5–4.9)
PLATELET # BLD AUTO: 241 X10*3/UL (ref 150–450)
POTASSIUM SERPL-SCNC: 4.1 MMOL/L (ref 3.5–5.3)
RBC # BLD AUTO: 4.47 X10*6/UL (ref 4.5–5.9)
SODIUM SERPL-SCNC: 136 MMOL/L (ref 136–145)
WBC # BLD AUTO: 8.5 X10*3/UL (ref 4.4–11.3)

## 2024-01-28 PROCEDURE — 71045 X-RAY EXAM CHEST 1 VIEW: CPT

## 2024-01-28 PROCEDURE — 1200000002 HC GENERAL ROOM WITH TELEMETRY DAILY

## 2024-01-28 PROCEDURE — 85027 COMPLETE CBC AUTOMATED: CPT | Performed by: STUDENT IN AN ORGANIZED HEALTH CARE EDUCATION/TRAINING PROGRAM

## 2024-01-28 PROCEDURE — 2500000001 HC RX 250 WO HCPCS SELF ADMINISTERED DRUGS (ALT 637 FOR MEDICARE OP): Performed by: PHYSICIAN ASSISTANT

## 2024-01-28 PROCEDURE — 2500000001 HC RX 250 WO HCPCS SELF ADMINISTERED DRUGS (ALT 637 FOR MEDICARE OP): Performed by: STUDENT IN AN ORGANIZED HEALTH CARE EDUCATION/TRAINING PROGRAM

## 2024-01-28 PROCEDURE — 80069 RENAL FUNCTION PANEL: CPT | Performed by: STUDENT IN AN ORGANIZED HEALTH CARE EDUCATION/TRAINING PROGRAM

## 2024-01-28 PROCEDURE — 2500000002 HC RX 250 W HCPCS SELF ADMINISTERED DRUGS (ALT 637 FOR MEDICARE OP, ALT 636 FOR OP/ED): Performed by: STUDENT IN AN ORGANIZED HEALTH CARE EDUCATION/TRAINING PROGRAM

## 2024-01-28 PROCEDURE — 2500000004 HC RX 250 GENERAL PHARMACY W/ HCPCS (ALT 636 FOR OP/ED): Performed by: STUDENT IN AN ORGANIZED HEALTH CARE EDUCATION/TRAINING PROGRAM

## 2024-01-28 PROCEDURE — 94640 AIRWAY INHALATION TREATMENT: CPT

## 2024-01-28 PROCEDURE — 36415 COLL VENOUS BLD VENIPUNCTURE: CPT | Performed by: STUDENT IN AN ORGANIZED HEALTH CARE EDUCATION/TRAINING PROGRAM

## 2024-01-28 PROCEDURE — 2500000001 HC RX 250 WO HCPCS SELF ADMINISTERED DRUGS (ALT 637 FOR MEDICARE OP): Performed by: NURSE PRACTITIONER

## 2024-01-28 PROCEDURE — 2500000005 HC RX 250 GENERAL PHARMACY W/O HCPCS: Performed by: NURSE PRACTITIONER

## 2024-01-28 PROCEDURE — 2500000004 HC RX 250 GENERAL PHARMACY W/ HCPCS (ALT 636 FOR OP/ED): Performed by: NURSE PRACTITIONER

## 2024-01-28 PROCEDURE — 83735 ASSAY OF MAGNESIUM: CPT | Performed by: STUDENT IN AN ORGANIZED HEALTH CARE EDUCATION/TRAINING PROGRAM

## 2024-01-28 RX ADMIN — PANTOPRAZOLE SODIUM 20 MG: 20 TABLET, DELAYED RELEASE ORAL at 06:10

## 2024-01-28 RX ADMIN — IPRATROPIUM BROMIDE AND ALBUTEROL SULFATE 3 ML: .5; 3 SOLUTION RESPIRATORY (INHALATION) at 14:37

## 2024-01-28 RX ADMIN — HEPARIN SODIUM 5000 UNITS: 5000 INJECTION INTRAVENOUS; SUBCUTANEOUS at 01:30

## 2024-01-28 RX ADMIN — PANTOPRAZOLE SODIUM 20 MG: 20 TABLET, DELAYED RELEASE ORAL at 15:00

## 2024-01-28 RX ADMIN — LIDOCAINE 2 PATCH: 4 PATCH TOPICAL at 08:06

## 2024-01-28 RX ADMIN — ACETAMINOPHEN 650 MG: 325 TABLET ORAL at 12:16

## 2024-01-28 RX ADMIN — IPRATROPIUM BROMIDE AND ALBUTEROL SULFATE 3 ML: .5; 3 SOLUTION RESPIRATORY (INHALATION) at 03:09

## 2024-01-28 RX ADMIN — SENNOSIDES AND DOCUSATE SODIUM 2 TABLET: 8.6; 5 TABLET ORAL at 21:02

## 2024-01-28 RX ADMIN — IPRATROPIUM BROMIDE AND ALBUTEROL SULFATE 3 ML: .5; 3 SOLUTION RESPIRATORY (INHALATION) at 20:38

## 2024-01-28 RX ADMIN — METHOCARBAMOL TABLETS 500 MG: 500 TABLET, COATED ORAL at 21:01

## 2024-01-28 RX ADMIN — ACETAMINOPHEN 650 MG: 325 TABLET ORAL at 06:10

## 2024-01-28 RX ADMIN — TOPIRAMATE 12.5 MG: 25 TABLET, FILM COATED ORAL at 08:06

## 2024-01-28 RX ADMIN — ACETAMINOPHEN 650 MG: 325 TABLET ORAL at 01:30

## 2024-01-28 RX ADMIN — HEPARIN SODIUM 5000 UNITS: 5000 INJECTION INTRAVENOUS; SUBCUTANEOUS at 10:58

## 2024-01-28 RX ADMIN — SENNOSIDES AND DOCUSATE SODIUM 2 TABLET: 8.6; 5 TABLET ORAL at 08:06

## 2024-01-28 RX ADMIN — METOPROLOL TARTRATE 25 MG: 25 TABLET, FILM COATED ORAL at 21:02

## 2024-01-28 RX ADMIN — METHOCARBAMOL TABLETS 500 MG: 500 TABLET, COATED ORAL at 06:10

## 2024-01-28 RX ADMIN — METOPROLOL TARTRATE 25 MG: 25 TABLET, FILM COATED ORAL at 08:06

## 2024-01-28 RX ADMIN — HEPARIN SODIUM 5000 UNITS: 5000 INJECTION INTRAVENOUS; SUBCUTANEOUS at 17:57

## 2024-01-28 RX ADMIN — IPRATROPIUM BROMIDE AND ALBUTEROL SULFATE 3 ML: .5; 3 SOLUTION RESPIRATORY (INHALATION) at 08:53

## 2024-01-28 RX ADMIN — METHOCARBAMOL TABLETS 500 MG: 500 TABLET, COATED ORAL at 14:59

## 2024-01-28 RX ADMIN — ALPRAZOLAM 0.5 MG: 0.25 TABLET ORAL at 22:24

## 2024-01-28 RX ADMIN — ACETAMINOPHEN 650 MG: 325 TABLET ORAL at 17:57

## 2024-01-28 ASSESSMENT — COGNITIVE AND FUNCTIONAL STATUS - GENERAL
MOBILITY SCORE: 24
MOBILITY SCORE: 24
DAILY ACTIVITIY SCORE: 24
DAILY ACTIVITIY SCORE: 24

## 2024-01-28 ASSESSMENT — PAIN - FUNCTIONAL ASSESSMENT
PAIN_FUNCTIONAL_ASSESSMENT: 0-10
PAIN_FUNCTIONAL_ASSESSMENT: 0-10

## 2024-01-28 ASSESSMENT — PAIN SCALES - GENERAL
PAINLEVEL_OUTOF10: 4
PAINLEVEL_OUTOF10: 0 - NO PAIN
PAINLEVEL_OUTOF10: 0 - NO PAIN

## 2024-01-28 NOTE — PROGRESS NOTES
"Tao Renee is a 73 y.o. male on day 5 of admission presenting with Adenocarcinoma of right lung (CMS/HCC).    Subjective   Patient is feeling well this morning  He is breathing comfortably on room air  He denies fevers chills shortness of breath  He is in good spirits despite airleak and anticipating staying at least another day with this in the hospital  Chest tube output 120 cc last 24 hours       Objective     Physical Exam  HEENT: Normocephalic and atraumatic.   NECK: Trach midline. No JVD.   CHEST: Breathing comfortably on room air. Chest tube 120cc serous drainage over past 24 hours, + improving air leak, maintained to WS  HEART: Regular rate and rhythm. NSR with rate in 80s per tele review.   ABDOMEN: Soft, ND, nontender  NEUROLOGIC: Alert and oriented. Grossly intact.   EXTREMITIES: Moves all extremities equally. No lower extremity edema. No calf tenderness.     Last Recorded Vitals  Blood pressure 128/62, pulse 85, temperature 37.3 °C (99.1 °F), temperature source Temporal, resp. rate 18, height 1.905 m (6' 3\"), weight 111 kg (244 lb 11.4 oz), SpO2 93 %.  Intake/Output last 3 Shifts:  I/O last 3 completed shifts:  In: 1080 (9.7 mL/kg) [P.O.:1080]  Out: 2260 (20.4 mL/kg) [Urine:2000 (0.5 mL/kg/hr); Chest Tube:260]  Weight: 111 kg     Relevant Results  Scheduled medications  acetaminophen, 650 mg, oral, q6h  heparin (porcine), 5,000 Units, subcutaneous, q8h  ipratropium-albuteroL, 3 mL, nebulization, q6h  lidocaine, 2 patch, transdermal, Daily  methocarbamol, 500 mg, oral, q8h SKYLER  metoprolol tartrate, 25 mg, oral, BID  pantoprazole, 20 mg, oral, BID AC  sennosides-docusate sodium, 2 tablet, oral, BID  topiramate, 12.5 mg, oral, Daily      Continuous medications     PRN medications  PRN medications: ALPRAZolam, naloxone, naloxone, oxyCODONE, oxyCODONE, oxygen  No results found for this or any previous visit (from the past 24 hour(s)).  XR chest 1 view    Result Date: 1/27/2024  Interpreted By:  Mayo, " Van, STUDY: XR CHEST 1 VIEW;  1/27/2024 5:17 am   INDICATION: Signs/Symptoms:Evaluate for pneumothorax s/p right upper lobectomy and right lower superior segmentectomy.   COMPARISON: 01/26/2024.   ACCESSION NUMBER(S): [id number]   ORDERING CLINICIAN: JOSI ROMERO   FINDINGS: Right-sided chest tube in place.   CARDIOMEDIASTINAL SILHOUETTE: Cardiomediastinal silhouette is normal in size and configuration.   LUNGS: Slight interval increase in right-sided apical and basilar pneumothorax. Right retrocardiac atelectasis/effusion.   ABDOMEN: No remarkable upper abdominal findings.   BONES: No acute osseous changes.       1.  Slight interval increase in right-sided pneumothorax. Correlate with air leak.     Signed by: aVn Huber 1/27/2024 5:45 PM Dictation workstation:   [id number]    XR chest 1 view    Result Date: 1/26/2024  Interpreted By:  Sharan Escobar and Ritchie Brandon STUDY: XR CHEST 1 VIEW;  1/26/2024 3:14 pm   INDICATION: Signs/Symptoms:evaluate chest tube to WS.   COMPARISON: Chest x-ray 01/26/2024, 3:45 a.m.   ACCESSION NUMBER(S): [id number]   ORDERING CLINICIAN: SHELLY BA   FINDINGS: AP radiograph of the chest was provided.   Right apical chest tube in similar position compared to prior exam.   CARDIOMEDIASTINAL SILHOUETTE: Cardiomediastinal silhouette is stable in size and configuration.   LUNGS: Improved aeration of the right lung. There is small, stable right apical pneumothorax. Mildly improved consolidation along the right heart border, likely an area of right middle lobe atelectasis. Of the left lung is overall unremarkable in appearance.   ABDOMEN: No remarkable upper abdominal findings.   BONES: No acute osseous changes.       1.  Stable small right apical pneumothorax. 2. Improving consolidation along the right heart border, likely resolving right middle lobe atelectasis.   I personally reviewed the images/study and I agree with the findings as stated by resident Arnoldo RIVAS  Héctor. This study was interpreted at [place], [place].   MACRO: None   Signed by: Sharan Escobar 1/26/2024 3:49 PM Dictation workstation:   [id number]    XR chest 1 view    Result Date: 1/26/2024  Interpreted By:  Sharan Escobar and Ritchie Brandon STUDY: XR CHEST 1 VIEW;  1/26/2024 3:55 am   INDICATION: Signs/Symptoms:Evaluate for pneumothorax s/p right upper lobectomy and right lower superior segmentectomy.   COMPARISON: Chest x-ray 01/25/2024   ACCESSION NUMBER(S): [id number]   ORDERING CLINICIAN: JOSI ROMERO   FINDINGS: AP radiograph of the chest was provided.   Right-sided surgical drain projecting over the right lung apex.   CARDIOMEDIASTINAL SILHOUETTE: Cardiomediastinal silhouette is normal in size and configuration.   LUNGS: Postsurgical changes of right upper lobe segmentectomy. Small pneumothorax at the right lung apex. Mildly improved consolidation along the right heart border, again likely middle lobe atelectasis. The left lung is overall unremarkable in appearance.   ABDOMEN: No remarkable upper abdominal findings.   BONES: No acute osseous changes.       1.  Postsurgical changes of right upper lobe segmentectomy with persistence of a small right apical pneumothorax. Stable position of right apical chest tube. 2. Mildly improved consolidation along the right heart border, likely middle lobe atelectasis.   I personally reviewed the images/study and I agree with the findings as stated by resident Arnoldo Anaya. This study was interpreted at [facility], [place], [place].   MACRO: None   Signed by: Sharan Escobar 1/26/2024 3:45 PM Dictation workstation:   [id number]    XR chest 1 view    Result Date: 1/25/2024  Interpreted By:  Sharan Escobar and Ritchie Brandon STUDY: XR CHEST 1 VIEW;  1/25/2024 6:36 am   INDICATION: Signs/Symptoms:Evaluate for pneumothorax s/p right upper lobectomy and right lower superior  segmentectomy.   COMPARISON: Chest x-ray 01/24/2024   ACCESSION NUMBER(S): [id number]   ORDERING CLINICIAN: JOSI ROMERO   FINDINGS: AP radiograph of the chest was provided.   Right-sided surgical drain projecting over the right lung apex.   CARDIOMEDIASTINAL SILHOUETTE: Cardiomediastinal silhouette is normal in size and configuration.   LUNGS: Postsurgical changes of right upper lobe segmentectomy. Mildly increased right-sided pneumothorax, moderate in size. Left lung is overall unremarkable in appearance. There is increased consolidative opacities along the right heart border likely reflecting atelectasis.   ABDOMEN: No remarkable upper abdominal findings.   BONES: No acute osseous changes.       1.  Increased right-sided pneumothorax, now moderate in size with similar position of right-sided chest tube. 2. Postsurgical changes of right upper lobectomy/right lower superior segmentectomy. There is new density along the right heart border likely increasing atelectasis.   I personally reviewed the images/study and I agree with the findings as stated by resident Arnoldo Anaya. This study was interpreted at [place], [place].   MACRO: None   Signed by: Sharan Escobar 1/25/2024 4:55 PM Dictation workstation:   [id number]    XR chest 1 view    Result Date: 1/24/2024  Interpreted By:  Sharan Escobar and Ritchie Brandon STUDY: XR CHEST 1 VIEW;  1/24/2024 3:48 am   INDICATION: Signs/Symptoms:Evaluate for pneumothorax s/p right upper lobectomy and right lower superior segmentectomy.   COMPARISON: Chest x-ray 01/23/2024 11:42 a.m.   ACCESSION NUMBER(S): [id number]   ORDERING CLINICIAN: JOSI ROMERO   FINDINGS: AP radiograph of the chest was provided.   Stable postsurgical changes of right upper lobe right lower superior segmentectomy. Unchanged position of surgical drain projecting over the right lung apex.   CARDIOMEDIASTINAL SILHOUETTE: Cardiomediastinal silhouette is  stable in size and configuration.   LUNGS: Decreased size of previously seen moderate-sized pneumothorax, now small/apical in appearance. The left lung is unremarkable in appearance. There is persistent elevation of the right hemidiaphragm with right basilar atelectasis. No new pleural effusion   ABDOMEN: No remarkable upper abdominal findings.   BONES: No acute osseous changes.       1.  Decreased size of right-sided pneumothorax, now small/apical with similar positioning of right-sided chest tube. 2. Similar postsurgical changes right upper lobectomy/right lower superior segmentectomy with persistent elevation of the right hemidiaphragm and basilar atelectasis.   I personally reviewed the images/study and I agree with the findings as stated by resident Arnoldo Anyaa. This study was interpreted at [facility], [place], [place].   MACRO: None   Signed by: Sharan Escobar 1/24/2024 3:32 PM Dictation workstation:   [id number]    XR chest 1 view    Result Date: 1/24/2024  Interpreted By:  Sharan Escobar and Bera Kaustav STUDY: XR CHEST 1 VIEW;  1/23/2024 11:42 am   INDICATION: Signs/Symptoms:water seal trial/pneumothorax.   COMPARISON: Chest radiograph dated 01/23/2023 at 3:46 a.m.   ACCESSION NUMBER(S): [id number]   ORDERING CLINICIAN: YOLA HYATT   FINDINGS: AP radiograph of the chest is provided.   Postsurgical changes of right upper lobectomy and right lower superior segmentectomy, with a right apical chest tube similar in location as compared to prior. Subcutaneous emphysema seen within the bilateral neck.   CARDIOMEDIASTINAL SILHOUETTE: The cardiomediastinal silhouette is stable in size and configuration.   LUNGS: Has been interval increase in size of right-sided pneumothorax, now moderate. Probable tiny right basilar pneumothorax. The left lung is essentially clear. No pleural effusion. There is persistent elevation of the right hemidiaphragm, with mild bibasilar  atelectasis.   ABDOMEN: No remarkable upper abdominal findings.   BONES: No acute osseous abnormality.       1. Interval increase in size of right-sided pneumothorax, now moderate in size, with tiny basilar component, with a right apical chest tube in place, when compared to prior same day radiograph. 2. Similar postsurgical changes of right upper lobectomy and right lower superior segmentectomy, with similar elevation of the right hemidiaphragm. Mild right-greater-than-left basilar atelectasis.   I personally reviewed the images/study and I agree with the findings as stated. This study was interpreted at [place], [place].   MACRO: Radiology resident Paris Best discussed the significance and urgency of this critical finding by telephone with  Bela Huber CNP on 1/23/2024 at 6:32 pm.  (**-RCF-**) Findings: See findings.   Signed by: Sharan Escobar 1/24/2024 9:31 AM Dictation workstation:   [id number]    XR chest 1 view    Result Date: 1/23/2024  Interpreted By:  Sharan Escobar and Ritchie Brandon STUDY: XR CHEST 1 VIEW;  1/23/2024 3:46 am   INDICATION: Signs/Symptoms:Evaluate for pneumothorax s/p right upper lobectomy and right lower superior segmentectomy.   COMPARISON: Chest x-ray 01/22/2024   ACCESSION NUMBER(S): [id number]   ORDERING CLINICIAN: JOSI ROMERO   FINDINGS: AP radiograph of the chest was provided.   Postsurgical changes of right upper lobe lobectomy/similar position of surgical drain projecting over the right lung apex.   CARDIOMEDIASTINAL SILHOUETTE: Cardiomediastinal silhouette is normal in size and configuration.   LUNGS: Persistent elevation of the right hemidiaphragm with of the right perihilar atelectasis. Decreased size of previously seen small right apical pneumothorax, now trace. No pleural effusion.   ABDOMEN: No remarkable upper abdominal findings.   BONES: No acute osseous changes.       1. Decreased size of previously seen small  right apical pneumothorax, now trace. 2. Similar elevation of the right hemidiaphragm with right perihilar/basilar atelectasis. 3. Surgical drain as described above in similar position.   I personally reviewed the images/study and I agree with the findings as stated by resident Arnoldo Anaya. This study was interpreted at [place], [place].   MACRO: None   Signed by: Sharan Escobar 1/23/2024 12:27 PM Dictation workstation:   [id number]    XR chest 1 view    Result Date: 1/22/2024  Interpreted By:  Sharan Escobar and Ritchie Brandon STUDY: XR CHEST 1 VIEW;  1/22/2024 4:46 pm   INDICATION: Signs/Symptoms:Evaluate for pneumothorax s/p right upper lobectomy and right lower superior segmentectomy.   COMPARISON: CT of the chest 12/08/2023   ACCESSION NUMBER(S): [id number]   ORDERING CLINICIAN: JOSI ROMERO   FINDINGS: AP radiograph of the chest was provided.   Postsurgical changes of right upper lobe lobectomy with surgical drain projecting over the right lung apex.   CARDIOMEDIASTINAL SILHOUETTE: Cardiomediastinal silhouette is normal in size and configuration.   LUNGS: There is elevation of the right hemidiaphragm, with right perihilar atelectasis. Small right apical pneumothorax. No pleural effusion. Are clear.   ABDOMEN: No remarkable upper abdominal findings.   BONES: No acute osseous changes.       1. Status post right upper lobe lobectomy with small right apical pneumothorax and right sided surgical drain as described above. 2. Elevation of the right hemidiaphragm with right perihilar atelectasis.   I personally reviewed the images/study and I agree with the findings as stated by resident Arnoldo Anaya. This study was interpreted at [place], [place].   MACRO: Arnoldo Anaya discussed the significance and urgency of this critical finding secure chat/epic chat with Dr. JOSI ROMERO on 1/22/2024 at 5:02 pm.   (**-RCF-**) Findings:  See findings.   Signed by: Sharan Escobar 1/22/2024 5:05 PM Dictation workstation:   [id number]    Transthoracic echo (TTE) complete    Result Date: 1/16/2024    [facility], [address], [facility] [address] [phone] and                                 Fax [phone] TRANSTHORACIC ECHOCARDIOGRAM REPORT  Patient Name:      DAHIANA Byrd Physician:    [id number] Colton Heller MD Study Date:        1/16/2024           Ordering Provider:    [id number] COLTON HELLER MRN/PID:           [id number]            Fellow: Accession#:        [id number]        Nurse: Date of Birth/Age: 1950 / 73 years Sonographer:          Grant Phillips BS,                                                              RVT, [facility] Gender:            M                   Additional Staff: Height:            190.50 cm           Admit Date:           1/16/2024 Weight:            111.13 kg           Admission Status:     Outpatient BSA:               2.39 m2             Encounter#:           [id number]                                        Department Location:  [facility] Blood Pressure: 142 /80 mmHg Study Type:    TRANSTHORACIC ECHO (TTE) COMPLETE Diagnosis/ICD: Shortness of breath-R06.02; Encounter for other preprocedural                examination-Z01.818; Thoracic aortic aneurysm, without rupture,                unspecified-I71.20 Indication:    Hypertension, Dyspnea, TAA, PreOp Clearance CPT Code:      Echo Complete w Full Doppler-34032 Patient History: Pertinent History: Cancer, HTN, Chest Pain, Hyperlipidemia and Dyspnea. -                    Thoracic Aortic Aneurysm                    - PreOp Clearance. Study Detail: The following Echo studies were performed: 2D, M-Mode, Doppler and               color flow. Technically challenging study due to body habitus and                prominent lung artifact. The patient was awake.  PHYSICIAN INTERPRETATION: Left Ventricle: The left ventricular systolic function is normal, with an estimated ejection fraction of 60-65%. There are no regional wall motion abnormalities. The left ventricular cavity size is normal. Spectral Doppler shows an impaired relaxation pattern of left ventricular diastolic filling. Left Atrium: The left atrium is normal in size. Right Ventricle: The right ventricle is normal in size. There is normal right ventricular global systolic function. Right Atrium: The right atrium is normal in size. Aortic Valve: The aortic valve appears structurally normal. There is no evidence of aortic valve regurgitation. The peak instantaneous gradient of the aortic valve is 6.1 mmHg. The mean gradient of the aortic valve is 3.0 mmHg. Mitral Valve: The mitral valve is normal in structure. There is trace mitral valve regurgitation. Tricuspid Valve: The tricuspid valve is structurally normal. There is trace tricuspid regurgitation. Pulmonic Valve: The pulmonic valve is structurally normal. There is no indication of pulmonic valve regurgitation. Pericardium: There is a trivial pericardial effusion. Aorta: The aortic root is normal. The ascending thoracic aorta is dilated measuring 4.5 cm at its maximal diameter. In comparison to the previous echocardiogram(s): There are no prior studies on this patient for comparison purposes.  CONCLUSIONS:  1. Left ventricular systolic function is normal with a 60-65% estimated ejection fraction.  2. Spectral Doppler shows an impaired relaxation pattern of left ventricular diastolic filling.  3. The ascending thoracic aorta is dilated measuring 4.5 cm at its maximal diameter.  4. Septated liver cyst appreciated. QUANTITATIVE DATA SUMMARY: 2D MEASUREMENTS:                           Normal Ranges: LAs:           3.77 cm    (2.7-4.0cm) IVSd:          1.57 cm    (0.6-1.1cm) LVPWd:         1.64 cm     (0.6-1.1cm) LVIDd:         3.87 cm    (3.9-5.9cm) LVIDs:         2.99 cm LV Mass Index: 123.9 g/m2 LV % FS        22.8 % LA VOLUME:                               Normal Ranges: LA Vol A4C:        36.2 ml    (22+/-6mL/m2) LA Vol A2C:        45.1 ml LA Vol BP:         40.9 ml LA Vol Index A4C:  15.1 ml/m2 LA Vol Index A2C:  18.9 ml/m2 LA Vol Index BP:   17.1 ml/m2 LA Area A4C:       14.3 cm2 LA Area A2C:       15.8 cm2 LA Major Axis A4C: 4.8 cm LA Major Axis A2C: 4.7 cm LA Vol A4C:        33.2 ml LA Vol A2C:        42.9 ml RA VOLUME BY A/L METHOD:                               Normal Ranges: RA Vol A4C:        28.9 ml    (8.3-19.5ml) RA Vol Index A4C:  12.1 ml/m2 RA Area A4C:       12.5 cm2 RA Major Axis A4C: 4.6 cm M-MODE MEASUREMENTS:                           Normal Ranges: Ao Root:       4.10 cm    (2.0-3.7cm) AoV Exc:       2.50 cm    (1.5-2.5cm) LAs:           2.73 cm    (2.7-4.0cm) IVSd:          1.65 cm    (0.6-1.1cm) LVPWd:         1.41 cm    (0.6-1.1cm) LVIDd:         7.10 cm    (3.9-5.9cm) LVIDs:         4.43 cm LV Mass Index: 240.2 g/m2 LV % FS        37.6 % AORTA MEASUREMENTS:                      Normal Ranges: AoV Exc:     2.50 cm (1.5-2.5cm) Ao Sinus, d: 3.70 cm (2.1-3.5cm) Ao STJ, d:   3.40 cm (1.7-3.4cm) Asc Ao, d:   4.50 cm (2.1-3.4cm) LV SYSTOLIC FUNCTION BY 2D PLANIMETRY (MOD):                     Normal Ranges: EF-A4C View: 65.8 % (>=55%) EF-A2C View: 65.0 % EF-Biplane:  63.8 % LV DIASTOLIC FUNCTION:                           Normal Ranges: MV Peak E:    0.57 m/s    (0.7-1.2 m/s) MV Peak A:    0.96 m/s    (0.42-0.7 m/s) E/A Ratio:    0.60        (1.0-2.2) MV lateral e' 0.04 m/s MV medial e'  0.04 m/s MV A Dur:     143.02 msec MITRAL VALVE:                 Normal Ranges: MV DT: 211 msec (150-240msec) AORTIC VALVE:                                   Normal Ranges: AoV Vmax:                1.24 m/s (<=1.7m/s) AoV Peak P.1 mmHg (<20mmHg) AoV Mean PG:             3.0 mmHg (1.7-11.5mmHg)  LVOT Max Nav:            0.99 m/s (<=1.1m/s) AoV VTI:                 23.59 cm (18-25cm) LVOT VTI:                21.53 cm LVOT Diameter:           2.29 cm  (1.8-2.4cm) AoV Area, VTI:           3.75 cm2 (2.5-5.5cm2) AoV Area,Vmax:           3.29 cm2 (2.5-4.5cm2) AoV Dimensionless Index: 0.91  RIGHT VENTRICLE: RV Basal 2.90 cm RV Mid   2.20 cm RV Major 7.2 cm TAPSE:   13.0 mm RV s'    0.00 m/s AORTA: Asc Ao Diam 4.67 cm  [id number] Torin Beasley MD Electronically signed on 1/16/2024 at 4:19:25 PM  ** Final (Updated) **     ECG 12 lead (Clinic Performed)    Result Date: 1/10/2024  Sinus rhythm with incomplete right bundle branch block morphology.                          Assessment/Plan   Principal Problem:    Adenocarcinoma of right lung (CMS/HCC)  Active Problems:    Gastroesophageal reflux disease    SAURABH (obstructive sleep apnea)    Tao Renee is a 73 y.o. male status post Bronchoscopy, robotic right upper lobe lobectomy, mediastinal lymph node dissection, right lower lobe superior segmental resection for a recurrent right upper lobe lung cancer with Dr. Bronson on 01/22/2024.  Ongoing airleak from chest tube. Failed water-seal trial beginning of the week, still with airleak, stable on waterseal.     Plan:  Neurology: post operative pain, hx 2-3 ETOH drinks daily   -Good pain control, continue scheduled Tylenol and Oxycodone as needed, monitor effectiveness and adjust dose as needed   -Continue Robaxin and Lidocaine patches  -Bowel regimen available for constipation secondary to pain medication   -Out of bed to the chair throughout the day  -Encourage ambulation as tolerated        Cardiovascular: h/o HTN, HLD, CAD on Lisinopril 2.5 mg at home   Normotensive, NSR on telemetry   -Continue telemetry  -Continue  Q4h VS  -Continue metoprolol 25mg BID for post operative arrhythmia prophylaxis   -Hold home regimen of Lisinopril in immediate post-op setting  -Replace electrolytes as needed for K >4, Mg >2     Pulmonology:  post op atelectasis, prev smoker-quit 2002.  chest tube management - +air leak  -Encourage incentive spirometer use every hour  -Continue pulmonary hygiene  -Right chest tube - to waterseal, airleak appears to be improving  -Obtain daily CXR  -Monitor and record chest tube drainage every shift  -Await pathology results      Gastrointestinal: h/o GERD  -Regular diet as tolerated  -Zofran available for nausea  -Scheduled colace, PRN bowel regimen, Bisacodyl suppository today   -Continue PPI     Genitourinary: h/o BPH  -Voiding quantity sufficient  -Continue to monitor daily electrolytes with routine BMPs   -Monitor I's and O's closely     Infectious Disease: afebrile; no leukocytosis  -Continue to trend daily temperatures and WBC count to monitor for signs of post-operative infection   -Monitor surgical incisions for signs of infection   -Perioperative antibiotics completed      Hematology/DVT Prophylaxis:   -Continue subcutaneous heparin and SCDs, early ambulation:   -Monitor for signs of acute blood loss  -Trend CBCs      Psych: h/o anxiety  -Home regimen of Topamax 12.5 gm and Alprazolam 0.5 as needed , continue home regimen      Disposition:  -Plan for discharge to home once CT removed and patient is medically stable, may consider discharge with CT if persistent air leak, in that case he will need home nursing care on discharge   -Continue  to assess home needs      Seen and discussed with Dr. Arley MENDIETA spent 35 minutes in the professional and overall care of this patient.      Lalo Herrera MD  General Surgery Resident  Thoracic Surgery [id number]

## 2024-01-28 NOTE — PROGRESS NOTES
"Tao Renee is a 73 y.o. male on day 6 of admission presenting with Adenocarcinoma of right lung (CMS/HCC).    Subjective   Patient is feeling well this morning  He is breathing comfortably on room air  He denies fevers chills shortness of breath  He is in good spirits despite airleak. Discussed the possibility of discharging him with a mini-atrium but he feels more comfortable remaining inpatient at this time.  Chest tube output 140 cc last 24 hours       Objective       HEENT: Normocephalic and atraumatic.   NECK: Trach midline. No JVD.   CHEST: Breathing comfortably on room air. Chest tube 140cc serous drainage over past 24 hours, + air leak, maintained to WS  HEART: Regular rate and rhythm. NSR  ABDOMEN: Soft, ND, nontender  NEUROLOGIC: Alert and oriented. Grossly intact.   EXTREMITIES: Moves all extremities equally. No lower extremity edema. No calf tenderness.     Last Recorded Vitals  Blood pressure 120/64, pulse 81, temperature 36.4 °C (97.5 °F), temperature source Temporal, resp. rate 17, height 1.905 m (6' 3\"), weight 111 kg (244 lb 11.4 oz), SpO2 95 %.  Intake/Output last 3 Shifts:  I/O last 3 completed shifts:  In: 600 (5.4 mL/kg) [P.O.:600]  Out: 1935 (17.4 mL/kg) [Urine:1725 (0.4 mL/kg/hr); Chest Tube:210]  Weight: 111 kg     Relevant Results  Scheduled medications  acetaminophen, 650 mg, oral, q6h  heparin (porcine), 5,000 Units, subcutaneous, q8h  ipratropium-albuteroL, 3 mL, nebulization, q6h  lidocaine, 2 patch, transdermal, Daily  methocarbamol, 500 mg, oral, q8h SKYLER  metoprolol tartrate, 25 mg, oral, BID  pantoprazole, 20 mg, oral, BID AC  sennosides-docusate sodium, 2 tablet, oral, BID  topiramate, 12.5 mg, oral, Daily      Continuous medications     PRN medications  PRN medications: ALPRAZolam, naloxone, naloxone, oxyCODONE, oxyCODONE, oxygen  Results for orders placed or performed during the hospital encounter of 01/22/24 (from the past 24 hour(s))   Magnesium   Result Value Ref Range    " Magnesium 2.07 1.60 - 2.40 mg/dL   Renal Function Panel   Result Value Ref Range    Glucose 91 74 - 99 mg/dL    Sodium 136 136 - 145 mmol/L    Potassium 4.1 3.5 - 5.3 mmol/L    Chloride 106 98 - 107 mmol/L    Bicarbonate 21 21 - 32 mmol/L    Anion Gap 13 10 - 20 mmol/L    Urea Nitrogen 14 6 - 23 mg/dL    Creatinine 0.99 0.50 - 1.30 mg/dL    eGFR 80 >60 mL/min/1.73m*2    Calcium 8.8 8.6 - 10.6 mg/dL    Phosphorus 3.2 2.5 - 4.9 mg/dL    Albumin 3.1 (L) 3.4 - 5.0 g/dL   CBC   Result Value Ref Range    WBC 8.5 4.4 - 11.3 x10*3/uL    nRBC 0.0 0.0 - 0.0 /100 WBCs    RBC 4.47 (L) 4.50 - 5.90 x10*6/uL    Hemoglobin 12.7 (L) 13.5 - 17.5 g/dL    Hematocrit 39.4 (L) 41.0 - 52.0 %    MCV 88 80 - 100 fL    MCH 28.4 26.0 - 34.0 pg    MCHC 32.2 32.0 - 36.0 g/dL    RDW 13.2 11.5 - 14.5 %    Platelets 241 150 - 450 x10*3/uL               CXR 1/28  Impression:     1.  Right-sided chest tube in place with continued right-sided  Pneumothorax; similar in size. Correlate with chest tube function.       Assessment/Plan   Principal Problem:    Adenocarcinoma of right lung (CMS/HCC)  Active Problems:    Gastroesophageal reflux disease    SAURABH (obstructive sleep apnea)    Tao Renee is a 73 y.o. male status post Bronchoscopy, robotic right upper lobe lobectomy, mediastinal lymph node dissection, right lower lobe superior segmental resection for a recurrent right upper lobe lung cancer with Dr. Bronson on 01/22/2024.  Ongoing airleak from chest tube. Failed water-seal trial beginning of the week, still with airleak, stable on waterseal.     Plan:  Neurology: post operative pain, hx 2-3 ETOH drinks daily   -Good pain control, continue scheduled Tylenol and Oxycodone as needed, monitor effectiveness and adjust dose as needed   -Continue Robaxin and Lidocaine patches  -Bowel regimen available for constipation secondary to pain medication   -Out of bed to the chair throughout the day  -Encourage ambulation as tolerated     Cardiovascular: h/o  HTN, HLD, CAD on Lisinopril 2.5 mg at home   Normotensive, NSR on telemetry   -Continue telemetry  -Continue  Q4h VS  -Continue metoprolol 25mg BID for post operative arrhythmia prophylaxis   -Hold home regimen of Lisinopril in immediate post-op setting  -Replace electrolytes as needed for K >4, Mg >2     Pulmonology: post op atelectasis, prev smoker-quit 2002.  chest tube management - +air leak  -Encourage incentive spirometer use every hour  -Continue pulmonary hygiene  -Right chest tube - to waterseal, airleak appears to be improving  -Obtain daily CXR  -Monitor and record chest tube drainage every shift  -Await pathology results      Gastrointestinal: h/o GERD  -Regular diet as tolerated  -Zofran available for nausea  -Scheduled colace, PRN bowel regimen, Bisacodyl suppository today   -Continue PPI     Genitourinary: h/o BPH  -Voiding quantity sufficient  -Continue to monitor daily electrolytes with routine BMPs   -Monitor I's and O's closely     Infectious Disease: afebrile; no leukocytosis  -Continue to trend daily temperatures and WBC count to monitor for signs of post-operative infection   -Monitor surgical incisions for signs of infection   -Perioperative antibiotics completed      Hematology/DVT Prophylaxis:   -Continue subcutaneous heparin and SCDs, early ambulation:   -Monitor for signs of acute blood loss  -Trend CBCs      Psych: h/o anxiety  -Home regimen of Topamax 12.5 gm and Alprazolam 0.5 as needed , continue home regimen      Disposition:  -Plan for discharge to home once CT removed and patient is medically stable, may consider discharge with CT if persistent air leak, in that case he will need home nursing care on discharge   -Continue  to assess home needs      Seen and discussed with Dr. Arley Banks MD  General Surgery Resident  Thoracic Surgery [id number]

## 2024-01-28 NOTE — CARE PLAN
Problem: Pain  Goal: Takes deep breaths with improved pain control throughout the shift  Outcome: Progressing  Goal: Turns in bed with improved pain control throughout the shift  Outcome: Progressing  Goal: Walks with improved pain control throughout the shift  Outcome: Progressing  Goal: Performs ADL's with improved pain control throughout shift  Outcome: Progressing  Goal: Participates in PT with improved pain control throughout the shift  Outcome: Progressing  Goal: Free from opioid side effects throughout the shift  Outcome: Progressing  Goal: Free from acute confusion related to pain meds throughout the shift  Outcome: Progressing     Problem: Pain - Adult  Goal: Verbalizes/displays adequate comfort level or baseline comfort level  Outcome: Progressing     Problem: Safety - Adult  Goal: Free from fall injury  Outcome: Progressing     Problem: Discharge Planning  Goal: Discharge to home or other facility with appropriate resources  Outcome: Progressing     Problem: Chronic Conditions and Co-morbidities  Goal: Patient's chronic conditions and co-morbidity symptoms are monitored and maintained or improved  Outcome: Progressing   The patient's goals for the shift include      The clinical goals for the shift include Get a minimum of 4 hours of sleep

## 2024-01-29 ENCOUNTER — APPOINTMENT (OUTPATIENT)
Dept: RADIOLOGY | Facility: HOSPITAL | Age: 74
DRG: 164 | End: 2024-01-29
Payer: MEDICARE

## 2024-01-29 VITALS
OXYGEN SATURATION: 100 % | RESPIRATION RATE: 16 BRPM | SYSTOLIC BLOOD PRESSURE: 166 MMHG | TEMPERATURE: 98.1 F | HEIGHT: 75 IN | BODY MASS INDEX: 30.24 KG/M2 | WEIGHT: 243.2 LBS | DIASTOLIC BLOOD PRESSURE: 69 MMHG | HEART RATE: 86 BPM

## 2024-01-29 PROCEDURE — 71045 X-RAY EXAM CHEST 1 VIEW: CPT

## 2024-01-29 PROCEDURE — 71045 X-RAY EXAM CHEST 1 VIEW: CPT | Performed by: RADIOLOGY

## 2024-01-29 PROCEDURE — 2500000005 HC RX 250 GENERAL PHARMACY W/O HCPCS: Performed by: NURSE PRACTITIONER

## 2024-01-29 PROCEDURE — 94640 AIRWAY INHALATION TREATMENT: CPT

## 2024-01-29 PROCEDURE — 2500000001 HC RX 250 WO HCPCS SELF ADMINISTERED DRUGS (ALT 637 FOR MEDICARE OP): Performed by: STUDENT IN AN ORGANIZED HEALTH CARE EDUCATION/TRAINING PROGRAM

## 2024-01-29 PROCEDURE — 2500000004 HC RX 250 GENERAL PHARMACY W/ HCPCS (ALT 636 FOR OP/ED): Performed by: NURSE PRACTITIONER

## 2024-01-29 PROCEDURE — 2500000001 HC RX 250 WO HCPCS SELF ADMINISTERED DRUGS (ALT 637 FOR MEDICARE OP): Performed by: NURSE PRACTITIONER

## 2024-01-29 PROCEDURE — 2500000001 HC RX 250 WO HCPCS SELF ADMINISTERED DRUGS (ALT 637 FOR MEDICARE OP): Performed by: PHYSICIAN ASSISTANT

## 2024-01-29 PROCEDURE — 2500000004 HC RX 250 GENERAL PHARMACY W/ HCPCS (ALT 636 FOR OP/ED): Performed by: STUDENT IN AN ORGANIZED HEALTH CARE EDUCATION/TRAINING PROGRAM

## 2024-01-29 PROCEDURE — 99232 SBSQ HOSP IP/OBS MODERATE 35: CPT | Performed by: NURSE PRACTITIONER

## 2024-01-29 PROCEDURE — 2500000002 HC RX 250 W HCPCS SELF ADMINISTERED DRUGS (ALT 637 FOR MEDICARE OP, ALT 636 FOR OP/ED): Performed by: STUDENT IN AN ORGANIZED HEALTH CARE EDUCATION/TRAINING PROGRAM

## 2024-01-29 RX ORDER — OXYCODONE HYDROCHLORIDE 5 MG/1
5 TABLET ORAL EVERY 6 HOURS PRN
Qty: 20 TABLET | Refills: 0 | Status: SHIPPED | OUTPATIENT
Start: 2024-01-29

## 2024-01-29 RX ORDER — ACETAMINOPHEN 325 MG/1
650 TABLET ORAL EVERY 6 HOURS
Start: 2024-01-29 | End: 2024-06-06 | Stop reason: WASHOUT

## 2024-01-29 RX ADMIN — METOPROLOL TARTRATE 25 MG: 25 TABLET, FILM COATED ORAL at 08:57

## 2024-01-29 RX ADMIN — IPRATROPIUM BROMIDE AND ALBUTEROL SULFATE 3 ML: .5; 3 SOLUTION RESPIRATORY (INHALATION) at 14:02

## 2024-01-29 RX ADMIN — METHOCARBAMOL TABLETS 500 MG: 500 TABLET, COATED ORAL at 13:58

## 2024-01-29 RX ADMIN — ACETAMINOPHEN 650 MG: 325 TABLET ORAL at 02:52

## 2024-01-29 RX ADMIN — IPRATROPIUM BROMIDE AND ALBUTEROL SULFATE 3 ML: .5; 3 SOLUTION RESPIRATORY (INHALATION) at 08:26

## 2024-01-29 RX ADMIN — TOPIRAMATE 12.5 MG: 25 TABLET, FILM COATED ORAL at 08:57

## 2024-01-29 RX ADMIN — SENNOSIDES AND DOCUSATE SODIUM 2 TABLET: 8.6; 5 TABLET ORAL at 08:57

## 2024-01-29 RX ADMIN — HEPARIN SODIUM 5000 UNITS: 5000 INJECTION INTRAVENOUS; SUBCUTANEOUS at 10:45

## 2024-01-29 RX ADMIN — HEPARIN SODIUM 5000 UNITS: 5000 INJECTION INTRAVENOUS; SUBCUTANEOUS at 02:51

## 2024-01-29 RX ADMIN — PANTOPRAZOLE SODIUM 20 MG: 20 TABLET, DELAYED RELEASE ORAL at 08:57

## 2024-01-29 RX ADMIN — IPRATROPIUM BROMIDE AND ALBUTEROL SULFATE 3 ML: .5; 3 SOLUTION RESPIRATORY (INHALATION) at 03:15

## 2024-01-29 RX ADMIN — ACETAMINOPHEN 650 MG: 325 TABLET ORAL at 10:46

## 2024-01-29 RX ADMIN — LIDOCAINE 2 PATCH: 4 PATCH TOPICAL at 08:56

## 2024-01-29 RX ADMIN — METHOCARBAMOL TABLETS 500 MG: 500 TABLET, COATED ORAL at 06:03

## 2024-01-29 NOTE — HOSPITAL COURSE
Tao JUANA Renee is a 73 y.o. male status post Bronchoscopy, robotic right upper lobe lobectomy, mediastinal lymph node dissection, right lower lobe superior segmental resection for a recurrent right upper lobe lung cancer with Dr. Bronson on 01/22/2024.  Hospitalization prolonged by persistent airleak.  Chest tube removed on pod #7 after 4 hour clamp trial.

## 2024-01-29 NOTE — PROGRESS NOTES
Spiritual Care Visit    Clinical Encounter Type  Visited With: Patient  Routine Visit: Introduction  Continue Visiting: Yes  Surgical Visit: Post-op    Muslim Encounters  Muslim Needs: Prayer         Sacramental Encounters  Other Sacrament: P&B    Patient received a prayer and blessing by Fr. Torin Hutchins,  [place] .

## 2024-01-29 NOTE — PROGRESS NOTES
"Tao Renee is a 73 y.o. male on day 7 of admission presenting with Adenocarcinoma of right lung (CMS/HCC).    Transitional Care Coordination Progress Note:   Patient discussed during interdisciplinary rounds.   Team members present: (TCC, Thoracic- NP- PRABHU)   Plan per Medical/Surgical team: (Thoracic surgery- chest tube management)   Discharge disposition: (home no needs)   Status- In patient   Payer- Medicare A/B, [employer]  Potential Barriers: (Chest tube removal)   ADOD: 1-2 days)   .Galileo Joel RN [facility] [phone]       Physical Exam    Last Recorded Vitals  Blood pressure 142/76, pulse 86, temperature 36.4 °C (97.5 °F), temperature source Temporal, resp. rate 16, height 1.905 m (6' 3\"), weight 110 kg (243 lb 3.2 oz), SpO2 98 %.  Intake/Output last 3 Shifts:  I/O last 3 completed shifts:  In: 840 (7.6 mL/kg) [P.O.:840]  Out: 1840 (16.7 mL/kg) [Urine:1650 (0.4 mL/kg/hr); Chest Tube:190]  Weight: 110.3 kg         Assessment/Plan   Principal Problem:    Adenocarcinoma of right lung (CMS/HCC)  Active Problems:    Gastroesophageal reflux disease    SAURABH (obstructive sleep apnea)              Galileo Joel RN      "

## 2024-01-29 NOTE — DISCHARGE SUMMARY
"Discharge Diagnosis  Adenocarcinoma of right lung (CMS/HCC)    Issues Requiring Follow-Up  Final pathology    Test Results Pending At Discharge  Pending Labs       Order Current Status    Focused Solid Tumor Assay In process    Focused Solid Tumor Assay In process    Focused Solid Tumor Assay In process    Focused Solid Tumor Assay In process    Surgical Pathology Exam In process            Hospital Course  Tao Renee is a 73 y.o. male status post Bronchoscopy, robotic right upper lobe lobectomy, mediastinal lymph node dissection, right lower lobe superior segmental resection for a recurrent right upper lobe lung cancer with Dr. Bronson on 01/22/2024.  Hospitalization prolonged by persistent airleak.  Chest tube removed on pod #7 after 4 hour clamp trial.     Serial CXR satisfactory.     At the time of discharge, his pain was controlled on an oral pain regimen, He was breathing comfortably on room air.  Hewas ambulating independently.  He was tolerating a regular diet without nausea. He was voiding and moving bowels regularly.      The patient was educated on post operative activity restrictions, dietary guidelines, and pain management. He will follow up with Dr. Bronson in the outpatient setting in two weeks with a CXR just prior to this visit. The patient has been instructed to add an OTC stool softeners or laxative while taking oral analgesia.  Deep breathing, coughing, and walking at home encouraged.      Pertinent Physical Exam At Time of Discharge  General: He is a pleasant male currently in no distress, seen sitting up in bed.  /72   Pulse 74   Temp 36.6 °C (97.9 °F) (Temporal)   Resp 18   Ht 1.905 m (6' 3\")   Wt 110 kg (243 lb 3.2 oz)   SpO2 94%   BMI 30.40 kg/m²    Body mass index is 30.4 kg/m².   HEENT: Normocephalic and atraumatic.   NECK: Trach midline. No JVD.   CHEST: Breathing comfortably on room air. Chest tube with 150 ml sero-sang drainage over past 24 hours, + rush of air with valsalva " that ceases, then no reproducible leak after valsalva.  Chest tube clamped.   HEART: Regular rate and rhythm. NSR with rate in 80s per tele review.   ABDOMEN: Soft, ND, nontender, +BS , + flatus, + BM   NEUROLOGIC: Alert and oriented. Grossly intact.   EXTREMITIES: Moves all extremities equally. No lower extremity edema. No calf tenderness.     Home Medications     Medication List      START taking these medications     acetaminophen 325 mg tablet; Commonly known as: Tylenol; Take 2 tablets   (650 mg) by mouth every 6 hours.   oxyCODONE 5 mg immediate release tablet; Commonly known as: Roxicodone;   Take 1 tablet (5 mg) by mouth every 6 hours if needed (pain).     CONTINUE taking these medications     ALPRAZolam 0.5 mg tablet; Commonly known as: Xanax; Take 1 tablet (0.5   mg) by mouth as needed at bedtime for anxiety.   cyanocobalamin 1,000 mcg tablet; Commonly known as: Vitamin B-12   Flonase Allergy Relief 50 mcg/actuation nasal spray; Generic drug:   fluticasone   lisinopril 2.5 mg tablet; Take 1 tablet (2.5 mg) by mouth once daily.   NON FORMULARY   sildenafil 25 mg tablet; Commonly known as: Viagra   thiamine 100 mg tablet; Commonly known as: Vitamin B-1   topiramate 25 mg tablet; Commonly known as: Topamax; Take 0.5 tablets   (12.5 mg) by mouth once daily.   VITAMIN B-6 ORAL       Outpatient Follow-Up  Future Appointments   Date Time Provider Department Center   2/7/2024  1:00 PM Stephan Bronson MD [id number] [place]   4/25/2024  2:00 PM Rossi Chou MD [id number] [place]       Marcelle Hopson, APRN-CNP

## 2024-01-29 NOTE — PROGRESS NOTES
"Thoracic Surgery Progress Note  1/29/2024    Tao Renee is a 73 y.o. male 7 Days Post-Op status post Bronchoscopy, robotic right upper lobe lobectomy, mediastinal lymph node dissection, right lower lobe superior segmental resection for a recurrent right upper lobe lung cancer with Dr. Bronson on 01/22/2024.     Overnight issues: no acute issues overnight. Breathing comfortably on room air. CT remains to  with satisfactory serial imaging.      Physical Exam:  General: He is a pleasant male currently in no distress, seen sitting up in bed.  /72   Pulse 74   Temp 36.6 °C (97.9 °F) (Temporal)   Resp 18   Ht 1.905 m (6' 3\")   Wt 110 kg (243 lb 3.2 oz)   SpO2 94%   BMI 30.40 kg/m²    Body mass index is 30.4 kg/m².   HEENT: Normocephalic and atraumatic.   NECK: Trach midline. No JVD.   CHEST: Breathing comfortably on room air. Chest tube with 150 ml sero-sang drainage over past 24 hours, + rush of air with valsalva that ceases, then no reproducible leak after valsalva.  Chest tube clamped.   HEART: Regular rate and rhythm. NSR with rate in 80s per tele review.   ABDOMEN: Soft, ND, nontender, +BS , + flatus, + BM   NEUROLOGIC: Alert and oriented. Grossly intact.   EXTREMITIES: Moves all extremities equally. No lower extremity edema. No calf tenderness.     Diagnostics:   AM labs pending.    Scheduled medications  acetaminophen, 650 mg, oral, q6h  heparin (porcine), 5,000 Units, subcutaneous, q8h  ipratropium-albuteroL, 3 mL, nebulization, q6h  lidocaine, 2 patch, transdermal, Daily  methocarbamol, 500 mg, oral, q8h SKYLER  metoprolol tartrate, 25 mg, oral, BID  pantoprazole, 20 mg, oral, BID AC  sennosides-docusate sodium, 2 tablet, oral, BID  topiramate, 12.5 mg, oral, Daily      Continuous medications     PRN medications  PRN medications: ALPRAZolam, naloxone, naloxone, oxyCODONE, oxyCODONE, oxygen    Imaging:  Serial AM CXR reviewed. Expected post op changes and chest tube placement. No clinically " significant pneumothorax. No formal report at this time.       Assessment:  Tao Renee is a 73 y.o. male status post Bronchoscopy, robotic right upper lobe lobectomy, mediastinal lymph node dissection, right lower lobe superior segmental resection for a recurrent right upper lobe lung cancer with Dr. Bronson on 01/22/2024.  Chest tube clamped on AM rounds.     Plan:  Neurology: post operative pain, hx 2-3 ETOH drinks daily   -Good pain control, continue scheduled Tylenol and Oxycodone as needed, monitor effectiveness and adjust dose as needed   -Continue Robaxin and Lidocaine patches  -Bowel regimen available for constipation secondary to pain medication   -Out of bed to the chair throughout the day  -Encourage ambulation as tolerated      Cardiovascular: h/o HTN, HLD, CAD on Lisinopril 2.5 mg at home   Normotensive, NSR on telemetry   -Continue telemetry  -Continue  Q4h VS  -Continue metoprolol 25mg BID for post operative arrhythmia prophylaxis   -Hold home regimen of Lisinopril in immediate post-op setting  -Replace electrolytes as needed for K >4, Mg >2    Pulmonology: post op atelectasis, prev smoker-quit 2002.  chest tube management - likely resolved air leak  -Encourage incentive spirometer use every hour  -Continue pulmonary hygiene  -Right chest tube clamped on AM rounds--11:30 am CXR  -Obtain daily CXR  -Monitor and record chest tube drainage every shift  -Await pathology results --pending as of 1/29    Gastrointestinal: h/o GERD  -Regular diet as tolerated  -Zofran available for nausea  -Scheduled colace, PRN bowel regimen,  -Continue PPI    Genitourinary: h/o BPH  -Voiding quantity sufficient  -Continue to monitor daily electrolytes with routine BMPs   -Monitor I's and O's closely    Infectious Disease: afebrile; no leukocytosis  -Continue to trend daily temperatures and WBC count to monitor for signs of post-operative infection   -Monitor surgical incisions for signs of infection   -Perioperative  antibiotics completed     Hematology/DVT Prophylaxis:   -Continue subcutaneous heparin and SCDs, early ambulation:   -Monitor for signs of acute blood loss  -Trend CBCs     Psych: h/o anxiety  -Home regimen of Topamax 12.5 gm and Alprazolam 0.5 as needed , continue home regimen     Disposition:  -Plan for discharge to home once CT removed and patient is medically stable, possible as early as this PM pending clamp imaging  -Continue  to assess home needs     Patient seen and examined by this provider, d/w with an attending physician Dr. Bronson.    Marcelle Hopson,  APRN-CNP  Thoracic Surgery Pager [phone]

## 2024-01-29 NOTE — NURSING NOTE
Patient discharged at this time. Patient verbalized understanding of discharge instructions. IV and tele discontinued. Patient has all personal belongings and awaiting ride. Pending transport to [facility]. Ana Alcaraz RN.

## 2024-01-29 NOTE — CARE PLAN
Problem: Pain  Goal: Takes deep breaths with improved pain control throughout the shift  Outcome: Progressing  Goal: Turns in bed with improved pain control throughout the shift  Outcome: Progressing  Goal: Walks with improved pain control throughout the shift  Outcome: Progressing  Goal: Performs ADL's with improved pain control throughout shift  Outcome: Progressing  Goal: Participates in PT with improved pain control throughout the shift  Outcome: Progressing  Goal: Free from opioid side effects throughout the shift  Outcome: Progressing  Goal: Free from acute confusion related to pain meds throughout the shift  Outcome: Progressing     Problem: Safety - Adult  Goal: Free from fall injury  Outcome: Progressing

## 2024-02-02 PROBLEM — S39.012A STRAIN OF LUMBAR REGION: Status: RESOLVED | Noted: 2024-02-02 | Resolved: 2024-02-02

## 2024-02-02 PROBLEM — M47.819 SPONDYLOARTHROPATHY: Status: RESOLVED | Noted: 2023-09-06 | Resolved: 2024-02-02

## 2024-02-02 LAB
ELECTRONICALLY SIGNED BY: NORMAL
ELECTRONICALLY SIGNED BY: NORMAL
FOCUSED SOLID TUMOR DNA/RNA RESULTS: NORMAL
FOCUSED SOLID TUMOR DNA/RNA RESULTS: NORMAL

## 2024-02-05 NOTE — PROGRESS NOTES
"Subjective   Tao Renee  is a 73 y.o. male who presents for evaluation of recurrent right lung cancer status post robotic right upper lobe lobectomy and right lower lobe superior segmental resection on 1/22/24.    Briefly, this is a 72 male who presented to me with a pulmonary nodule. He underwent percutaneous biopsy and was found to have lung cancer. I recommended the patient undergo thoracoscopic lobectomy. This was performed on June 9, 2016 at [facility]. The patient had no intraoperative or postoperative complications.      In October 2019, I was concerned about a right-sided lung nodule which had changed in characteristic. I recommended a percutaneous lung biopsy, which was performed on 10/23/19. This was a difficult procedure and the results suggested only \"atypical\" findings. Subsequent imaging continued to be abnormal, and I recommended repeat percutaneous biopsy, and this was confirmed to be a new lipidic well-differentiated invasive adenocarcinoma. PET/CT 3/5/2020 suggested isolated cancer, and he was referred for ablative radiation therapy given his aversion to repeat surgery. The patient was treated with hypofractionated radiation from 4/21/2020 to 5/11/2020.  He was diagnosed with recurrent cancer in his right upper lung and had a lung nodule in his right lower lung.  I recommended surgical resection.  He was taken to the operating room on 1/22/2024 and underwent robotic right upper lobe lobectomy and right lower lobe superior segmental resection.  Postoperatively had an air leak, but this resolved.    Currently the patient is presenting for post-operative evaluation. They deny the following symptoms: chest pain, shortness of breath at rest, cough, hemoptysis, fevers, chills, and weight loss.  He isn't taking any more pain meds. He is walking well.      He  reports that he quit smoking about 23 years ago. His smoking use included cigarettes. He started smoking about 57 years ago. He has a 68.00 " pack-year smoking history. He has never used smokeless tobacco. He reports current alcohol use of about 6.0 standard drinks of alcohol per week. He reports that he does not use drugs.    Objective   Physical Exam  The patient is well-appearing and in no acute distress. The trachea is midline and there is no crepitus. The lungs were clear to auscultation, there was no dullness to percussion, no fremitus or egophony. There was good effort and excursion. The heart had a regular rate and rhythm. The abdomen was soft, nontender and nondistended. The extremities had no edema. Surgical incisions are healing well.  Diagnostic Studies  Path and CXR pending    Assessment/Plan   Overall, I believe that the patient is recovering appropriately from their recent surgery.     His path is not finalized. This will have a profound implications on the rest of his case.      The patient is recently status post surgical intervention.  I believe they are recovering appropriately.  Their clinical presentation suggest no evidence of postoperative complications. I would also like to get a CXR>     I recommend  await final path . CXR now.     I discussed this in detail with the patient, including a discussion of alternatives. They were comfortable with this approach.     Stephan Bronson MD  [phone]

## 2024-02-07 ENCOUNTER — HOSPITAL ENCOUNTER (OUTPATIENT)
Dept: RADIOLOGY | Facility: HOSPITAL | Age: 74
Discharge: HOME | End: 2024-02-07
Payer: MEDICARE

## 2024-02-07 ENCOUNTER — OFFICE VISIT (OUTPATIENT)
Dept: SURGERY | Facility: CLINIC | Age: 74
End: 2024-02-07
Payer: MEDICARE

## 2024-02-07 VITALS
RESPIRATION RATE: 20 BRPM | BODY MASS INDEX: 29.86 KG/M2 | WEIGHT: 240.2 LBS | HEIGHT: 75 IN | DIASTOLIC BLOOD PRESSURE: 88 MMHG | SYSTOLIC BLOOD PRESSURE: 116 MMHG | OXYGEN SATURATION: 98 % | HEART RATE: 109 BPM | TEMPERATURE: 97.9 F

## 2024-02-07 DIAGNOSIS — C34.90 MALIGNANT NEOPLASM OF LUNG, UNSPECIFIED LATERALITY, UNSPECIFIED PART OF LUNG (MULTI): ICD-10-CM

## 2024-02-07 DIAGNOSIS — C34.90 MALIGNANT NEOPLASM OF LUNG, UNSPECIFIED LATERALITY, UNSPECIFIED PART OF LUNG (MULTI): Primary | ICD-10-CM

## 2024-02-07 LAB
LAB AP ASR DISCLAIMER: NORMAL
LABORATORY COMMENT REPORT: NORMAL
PATH REPORT.COMMENTS IMP SPEC: NORMAL
PATH REPORT.FINAL DX SPEC: NORMAL
PATH REPORT.GROSS SPEC: NORMAL
PATH REPORT.RELEVANT HX SPEC: NORMAL
PATH REPORT.TOTAL CANCER: NORMAL
PATHOLOGY SYNOPTIC REPORT: NORMAL

## 2024-02-07 PROCEDURE — 3079F DIAST BP 80-89 MM HG: CPT | Performed by: THORACIC SURGERY (CARDIOTHORACIC VASCULAR SURGERY)

## 2024-02-07 PROCEDURE — 1159F MED LIST DOCD IN RCRD: CPT | Performed by: THORACIC SURGERY (CARDIOTHORACIC VASCULAR SURGERY)

## 2024-02-07 PROCEDURE — 71046 X-RAY EXAM CHEST 2 VIEWS: CPT | Performed by: RADIOLOGY

## 2024-02-07 PROCEDURE — 3074F SYST BP LT 130 MM HG: CPT | Performed by: THORACIC SURGERY (CARDIOTHORACIC VASCULAR SURGERY)

## 2024-02-07 PROCEDURE — 1036F TOBACCO NON-USER: CPT | Performed by: THORACIC SURGERY (CARDIOTHORACIC VASCULAR SURGERY)

## 2024-02-07 PROCEDURE — 1111F DSCHRG MED/CURRENT MED MERGE: CPT | Performed by: THORACIC SURGERY (CARDIOTHORACIC VASCULAR SURGERY)

## 2024-02-07 PROCEDURE — 1125F AMNT PAIN NOTED PAIN PRSNT: CPT | Performed by: THORACIC SURGERY (CARDIOTHORACIC VASCULAR SURGERY)

## 2024-02-07 PROCEDURE — 71046 X-RAY EXAM CHEST 2 VIEWS: CPT

## 2024-02-07 PROCEDURE — 99024 POSTOP FOLLOW-UP VISIT: CPT | Performed by: THORACIC SURGERY (CARDIOTHORACIC VASCULAR SURGERY)

## 2024-02-07 ASSESSMENT — ENCOUNTER SYMPTOMS
OCCASIONAL FEELINGS OF UNSTEADINESS: 0
DEPRESSION: 0
LOSS OF SENSATION IN FEET: 0

## 2024-02-07 ASSESSMENT — PAIN SCALES - GENERAL: PAINLEVEL: 2

## 2024-02-19 ENCOUNTER — OFFICE VISIT (OUTPATIENT)
Dept: PRIMARY CARE | Facility: CLINIC | Age: 74
End: 2024-02-19
Payer: MEDICARE

## 2024-02-19 VITALS
HEIGHT: 75 IN | BODY MASS INDEX: 29.84 KG/M2 | DIASTOLIC BLOOD PRESSURE: 81 MMHG | SYSTOLIC BLOOD PRESSURE: 149 MMHG | WEIGHT: 240 LBS | OXYGEN SATURATION: 94 % | HEART RATE: 82 BPM

## 2024-02-19 DIAGNOSIS — C34.91 ADENOCARCINOMA OF RIGHT LUNG (MULTI): Primary | ICD-10-CM

## 2024-02-19 DIAGNOSIS — M47.26 OSTEOARTHRITIS OF SPINE WITH RADICULOPATHY, LUMBAR REGION: ICD-10-CM

## 2024-02-19 PROCEDURE — 1125F AMNT PAIN NOTED PAIN PRSNT: CPT | Performed by: FAMILY MEDICINE

## 2024-02-19 PROCEDURE — 3077F SYST BP >= 140 MM HG: CPT | Performed by: FAMILY MEDICINE

## 2024-02-19 PROCEDURE — 1159F MED LIST DOCD IN RCRD: CPT | Performed by: FAMILY MEDICINE

## 2024-02-19 PROCEDURE — 1036F TOBACCO NON-USER: CPT | Performed by: FAMILY MEDICINE

## 2024-02-19 PROCEDURE — 1111F DSCHRG MED/CURRENT MED MERGE: CPT | Performed by: FAMILY MEDICINE

## 2024-02-19 PROCEDURE — 99214 OFFICE O/P EST MOD 30 MIN: CPT | Performed by: FAMILY MEDICINE

## 2024-02-19 PROCEDURE — 3079F DIAST BP 80-89 MM HG: CPT | Performed by: FAMILY MEDICINE

## 2024-02-19 ASSESSMENT — ENCOUNTER SYMPTOMS
COUGH: 1
NEUROLOGICAL NEGATIVE: 1
GASTROINTESTINAL NEGATIVE: 1
CARDIOVASCULAR NEGATIVE: 1
SHORTNESS OF BREATH: 1
FATIGUE: 1

## 2024-02-19 NOTE — PROGRESS NOTES
Subjective   Patient ID: Tao Renee is a 73 y.o. male who presents for Hospital Follow-up.  HPI  Pt with sp surg lobectomy breathing good  Review of Systems   Constitutional:  Positive for fatigue.   HENT: Negative.     Respiratory:  Positive for cough and shortness of breath.    Cardiovascular: Negative.    Gastrointestinal: Negative.    Genitourinary: Negative.    Neurological: Negative.      Doing well at this point no acute process  Objective   Physical Exam  Constitutional:       Appearance: Normal appearance.   HENT:      Head: Normocephalic and atraumatic.      Nose: Nose normal.      Mouth/Throat:      Mouth: Mucous membranes are moist.   Eyes:      Extraocular Movements: Extraocular movements intact.      Pupils: Pupils are equal, round, and reactive to light.   Cardiovascular:      Rate and Rhythm: Normal rate and regular rhythm.   Pulmonary:      Effort: Pulmonary effort is normal.      Breath sounds: Normal breath sounds.   Musculoskeletal:         General: Normal range of motion.      Cervical back: Normal range of motion.   Skin:     General: Skin is warm and dry.   Neurological:      Mental Status: He is alert.         Assessment/Plan            Rubio Tariq DO 02/19/24 10:08 AM

## 2024-02-21 LAB
ELECTRONICALLY SIGNED BY: NORMAL
FOCUSED SOLID TUMOR DNA/RNA RESULTS: NORMAL

## 2024-02-27 ENCOUNTER — TELEPHONE (OUTPATIENT)
Dept: HEMATOLOGY/ONCOLOGY | Facility: HOSPITAL | Age: 74
End: 2024-02-27
Payer: MEDICARE

## 2024-02-27 NOTE — TELEPHONE ENCOUNTER
Patient called to request later appt on Thursday; FUV changed from 0900 to 1620 per his request.  No further questions or concerns at this time.

## 2024-02-29 ENCOUNTER — OFFICE VISIT (OUTPATIENT)
Dept: HEMATOLOGY/ONCOLOGY | Facility: HOSPITAL | Age: 74
End: 2024-02-29
Payer: MEDICARE

## 2024-02-29 ENCOUNTER — ONCOLOGY MEDICATION OUTREACH (OUTPATIENT)
Dept: HEMATOLOGY/ONCOLOGY | Facility: HOSPITAL | Age: 74
End: 2024-02-29
Payer: MEDICARE

## 2024-02-29 ENCOUNTER — APPOINTMENT (OUTPATIENT)
Dept: HEMATOLOGY/ONCOLOGY | Facility: HOSPITAL | Age: 74
End: 2024-02-29
Payer: MEDICARE

## 2024-02-29 VITALS
SYSTOLIC BLOOD PRESSURE: 151 MMHG | WEIGHT: 236.33 LBS | HEART RATE: 89 BPM | TEMPERATURE: 97.7 F | OXYGEN SATURATION: 97 % | BODY MASS INDEX: 29.54 KG/M2 | DIASTOLIC BLOOD PRESSURE: 69 MMHG | RESPIRATION RATE: 20 BRPM

## 2024-02-29 DIAGNOSIS — C34.91 ADENOCARCINOMA OF RIGHT LUNG (MULTI): Primary | ICD-10-CM

## 2024-02-29 PROCEDURE — 1036F TOBACCO NON-USER: CPT | Performed by: INTERNAL MEDICINE

## 2024-02-29 PROCEDURE — 99215 OFFICE O/P EST HI 40 MIN: CPT | Performed by: INTERNAL MEDICINE

## 2024-02-29 PROCEDURE — 3077F SYST BP >= 140 MM HG: CPT | Performed by: INTERNAL MEDICINE

## 2024-02-29 PROCEDURE — 3078F DIAST BP <80 MM HG: CPT | Performed by: INTERNAL MEDICINE

## 2024-02-29 PROCEDURE — 1125F AMNT PAIN NOTED PAIN PRSNT: CPT | Performed by: INTERNAL MEDICINE

## 2024-02-29 PROCEDURE — 1159F MED LIST DOCD IN RCRD: CPT | Performed by: INTERNAL MEDICINE

## 2024-02-29 RX ORDER — HEPARIN 100 UNIT/ML
500 SYRINGE INTRAVENOUS AS NEEDED
Status: CANCELLED | OUTPATIENT
Start: 2024-02-29

## 2024-02-29 RX ORDER — HEPARIN SODIUM,PORCINE/PF 10 UNIT/ML
50 SYRINGE (ML) INTRAVENOUS AS NEEDED
Status: CANCELLED | OUTPATIENT
Start: 2024-02-29

## 2024-02-29 ASSESSMENT — PAIN SCALES - GENERAL: PAINLEVEL: 3

## 2024-02-29 NOTE — PROGRESS NOTES
ONCOLOGY CLINICAL PHARMACY NOTE     Subjective  Tao Renee is a 73 y.o. male with recurrent lung adenocarcinoma s/p RUL lobectomy and RLL superior segmental resection 1/22/24, to be treated with adjuvant chemotherapy and here for education. Patient presents to clinic by himself.         Objective  There were no vitals taken for this visit.  Lab Results   Component Value Date    WBC 8.5 01/28/2024    HGB 12.7 (L) 01/28/2024    HCT 39.4 (L) 01/28/2024    MCV 88 01/28/2024     01/28/2024      Lab Results   Component Value Date    GLUCOSE 91 01/28/2024    CALCIUM 8.8 01/28/2024     01/28/2024    K 4.1 01/28/2024    CO2 21 01/28/2024     01/28/2024    BUN 14 01/28/2024    CREATININE 0.99 01/28/2024     Lab Results   Component Value Date    ALT 13 11/29/2023    AST 10 11/29/2023    ALKPHOS 66 11/29/2023    BILITOT 0.7 11/29/2023       Allergies  Allergies   Allergen Reactions    Atorvastatin Itching    Azithromycin Diarrhea       Medications  Were medications reconciled this encounter: No     Current Outpatient Medications:     acetaminophen (Tylenol) 325 mg tablet, Take 2 tablets (650 mg) by mouth every 6 hours., Disp: , Rfl:     ALPRAZolam (Xanax) 0.5 mg tablet, Take 1 tablet (0.5 mg) by mouth as needed at bedtime for anxiety., Disp: 30 tablet, Rfl: 1    cyanocobalamin (Vitamin B-12) 1,000 mcg tablet, Take 1 tablet (1,000 mcg) by mouth once daily., Disp: , Rfl:     fluticasone (Flonase Allergy Relief) 50 mcg/actuation nasal spray, Administer 1 spray into each nostril once daily as needed., Disp: , Rfl:     lisinopril 2.5 mg tablet, Take 1 tablet (2.5 mg) by mouth once daily., Disp: 90 tablet, Rfl: 1    NON FORMULARY, Organic bitter apricot kernels 3-4 a day, Disp: , Rfl:     oxyCODONE (Roxicodone) 5 mg immediate release tablet, Take 1 tablet (5 mg) by mouth every 6 hours if needed (pain)., Disp: 20 tablet, Rfl: 0    pyridoxine HCl, vitamin B6, (VITAMIN B-6 ORAL), Take 100 mg by mouth., Disp: ,  Rfl:     sildenafil (Viagra) 25 mg tablet, Take 1 tablet (25 mg) by mouth. 1 hour before needed, Disp: , Rfl:     thiamine 100 mg tablet, Take by mouth., Disp: , Rfl:     topiramate (Topamax) 25 mg tablet, Take 0.5 tablets (12.5 mg) by mouth once daily., Disp: 90 tablet, Rfl: 1    Assessment and Plan  Tao Renee is a 73 y.o. male with recurrent lung adenocarcinoma s/p RUL lobectomy and RLL superior segmental resection 1/22/24, to be treated with adjuvant chemotherapy. Tentative plan for x4 cycles of adjuvant chemotherapy followed by up to 1 year of adjuvant pembrolizumab. Brief information was provided on regimen - per patient request, will think about decision to pursue cisplatin vs. carboplatin.    Chemotherapy (tentative plan):  TBD D1C1 date  Drug interactions identified: None  Dose adjustments (renal/hepatic, toxicities): None currently   Patient was briefly counseled on the following:   Cisplatin  Administration schedule: 75 mg/m2 on Day 1 of every 21-day cycle (4 cycles)  Side effects counseled on: decreased cell counts, nausea, renal toxicity (pre- and post- hydration), ototoxicity, taste changes  Pemetrexed  Administration schedule: 500 mg/m2 on Day 1 of every 21-day cycle (4 cycles)  Side effects counseled on: fatigue, decreased cell counts, rash  Folic acid 1 mg to be released from [facility] pending final decision  B12 1000 mcg IM injection every 3 cycles/9 weeks (first dose on C1D1)  Pembrolizumab  Administration schedule: 200 mg on Day 1 of every 21-day cycle    Supportive Care:  CINV:  Pre-meds day of treatment: Aloxi, dexamethasone, Emend  Prescriptions to be released from [facility] to patient's home pharmacy (pending finalizing of plan): Zyprexa nightly x4 nights starting Day 1, dexamethasone 8 mg daily Days 2-4, Zofran prn (first line), Compazine prn (second line; first line for 2-3 days following Day 1)  Growth Factor Support: None    Instructed patient to alert team and go to Emergency Department  for fevers of at least 100.4 degrees F, or other changes in clinical status. All questions were answered and my contact information was provided to patient. Next clinic follow-up with Dr. Arguelles next week (3/7) to discuss treatment planning/decision.     Sherri Michael [facility]  Clinical Pharmacist - Thoracic

## 2024-03-04 NOTE — PROGRESS NOTES
"Subjective   Tao Renee  is a 73 y.o. male who presents for discussion regarding adjuvant treatment of lung cancer    Briefly, this is a 72 male who presented to me with a pulmonary nodule. He underwent percutaneous biopsy and was found to have lung cancer. I recommended the patient undergo thoracoscopic lobectomy. This was performed on June 9, 2016 at [facility]. The patient had no intraoperative or postoperative complications.      In October 2019, I was concerned about a right-sided lung nodule which had changed in characteristic. I recommended a percutaneous lung biopsy, which was performed on 10/23/19. This was a difficult procedure and the results suggested only \"atypical\" findings. Subsequent imaging continued to be abnormal, and I recommended repeat percutaneous biopsy, and this was confirmed to be a new lipidic well-differentiated invasive adenocarcinoma. PET/CT 3/5/2020 suggested isolated cancer, and he was referred for ablative radiation therapy given his aversion to repeat surgery. The patient was treated with hypofractionated radiation from 4/21/2020 to 5/11/2020.  He was diagnosed with recurrent cancer in his right upper lung and had a lung nodule in his right lower lung.  I recommended surgical resection.  He was taken to the operating room on 1/22/2024 and underwent robotic right upper lobe lobectomy and right lower lobe superior segmental resection.  Based on his pathology result, medical oncology is recommending adjuvant systemic treatment.    Objective   Physical Exam  Phone visit  Diagnostic Studies  I reviewed the note from Dr. Arguelles    Assessment/Plan   Overall, I believe that the patient is doing well.     We had a long discussion about adjuvant treatment. I believe that this is beneficial.     I recommend  consideration of adjuvant treatment. If he chooses not to receive treatment, then I would offer a CT in 6 months.     I discussed this in detail with the patient, including a " discussion of alternatives. They were comfortable with this approach.     Time 12 min    Stephan Bronson MD  [phone]

## 2024-03-06 ENCOUNTER — HOSPITAL ENCOUNTER (OUTPATIENT)
Dept: RADIOLOGY | Facility: HOSPITAL | Age: 74
Discharge: HOME | End: 2024-03-06
Payer: MEDICARE

## 2024-03-06 ENCOUNTER — TELEMEDICINE (OUTPATIENT)
Dept: SURGERY | Facility: CLINIC | Age: 74
End: 2024-03-06
Payer: MEDICARE

## 2024-03-06 DIAGNOSIS — C34.91 ADENOCARCINOMA OF RIGHT LUNG (MULTI): Primary | ICD-10-CM

## 2024-03-06 DIAGNOSIS — C34.91 ADENOCARCINOMA OF RIGHT LUNG (MULTI): ICD-10-CM

## 2024-03-06 PROCEDURE — 1036F TOBACCO NON-USER: CPT | Performed by: THORACIC SURGERY (CARDIOTHORACIC VASCULAR SURGERY)

## 2024-03-06 PROCEDURE — 70553 MRI BRAIN STEM W/O & W/DYE: CPT

## 2024-03-06 PROCEDURE — 1159F MED LIST DOCD IN RCRD: CPT | Performed by: THORACIC SURGERY (CARDIOTHORACIC VASCULAR SURGERY)

## 2024-03-06 PROCEDURE — 2550000001 HC RX 255 CONTRASTS: Performed by: INTERNAL MEDICINE

## 2024-03-06 PROCEDURE — A9575 INJ GADOTERATE MEGLUMI 0.1ML: HCPCS | Performed by: INTERNAL MEDICINE

## 2024-03-06 PROCEDURE — 70553 MRI BRAIN STEM W/O & W/DYE: CPT | Performed by: RADIOLOGY

## 2024-03-06 PROCEDURE — 1125F AMNT PAIN NOTED PAIN PRSNT: CPT | Performed by: THORACIC SURGERY (CARDIOTHORACIC VASCULAR SURGERY)

## 2024-03-06 RX ORDER — METHOCARBAMOL 500 MG/1
TABLET, FILM COATED ORAL EVERY 8 HOURS
COMMUNITY
Start: 2024-01-24 | End: 2024-03-06 | Stop reason: ALTCHOICE

## 2024-03-06 RX ORDER — GADOTERATE MEGLUMINE 376.9 MG/ML
20 INJECTION INTRAVENOUS
Status: COMPLETED | OUTPATIENT
Start: 2024-03-06 | End: 2024-03-06

## 2024-03-06 RX ADMIN — GADOTERATE MEGLUMINE 20 ML: 376.9 INJECTION INTRAVENOUS at 16:41

## 2024-03-06 ASSESSMENT — ENCOUNTER SYMPTOMS
OCCASIONAL FEELINGS OF UNSTEADINESS: 0
DEPRESSION: 0
LOSS OF SENSATION IN FEET: 1

## 2024-03-07 ENCOUNTER — TELEMEDICINE (OUTPATIENT)
Dept: HEMATOLOGY/ONCOLOGY | Facility: HOSPITAL | Age: 74
End: 2024-03-07
Payer: MEDICARE

## 2024-03-07 DIAGNOSIS — C34.81 CANCER OF OVERLAPPING SITES OF RIGHT LUNG (MULTI): Primary | ICD-10-CM

## 2024-03-07 PROCEDURE — 1159F MED LIST DOCD IN RCRD: CPT | Performed by: INTERNAL MEDICINE

## 2024-03-07 PROCEDURE — 99214 OFFICE O/P EST MOD 30 MIN: CPT | Performed by: INTERNAL MEDICINE

## 2024-03-07 PROCEDURE — 1125F AMNT PAIN NOTED PAIN PRSNT: CPT | Performed by: INTERNAL MEDICINE

## 2024-03-07 PROCEDURE — 1036F TOBACCO NON-USER: CPT | Performed by: INTERNAL MEDICINE

## 2024-03-07 NOTE — PROGRESS NOTES
Patient ID: Tao Renee is a 73 y.o. male    Primary Care Provider: Rubio Tariq DO    DIAGNOSIS AND STAGING  Multifocal adenocarcinoma of the lung -  Three different genomic profiles seen in specimens from 2020 and 2024  In 2024, 2 tumors in different lobes (RUL and RLL) harboring a KRAS G12C mutation (compatible with mpT4 pN0 M0).       SITES OF DISEASE  RUL and RLL      MOLECULAR GENOMICS    From 02/27/2020 biopsy of RUL:  PD-L1 TPS 10%     MICROSATELLITE STATUS: Microsatellite Stable (NAYA)     DISEASE ASSOCIATED GENOMIC FINDINGS:  CDKN2A p.Y327Fli*8 (NM_000077: c.332dupG)  CTNNB1 p.S37F (NM_001904: c.110C>T), subclonal  EGFR p.G719D (NM_005228: c.2156G>A)  EGFR p.S768I (NM_005228: c.2303G>T)       INTERPRETATION AND POTENTIAL THERAPEUTIC OPTIONS:  EGFR p.G719D and p.S768I   FDA RECOMMENDATIONS: Afatinib (NSCLC)    From RUL lobectomy and RLL wedge resection in 01/2024:   Specimen: FFPE, [id number] H7 (1.2 cm adenocarcinoma RUL)   Estimated Tumor Content: 20%  PD-L1 TPS 90%     MICROSATELLITE STATUS: Cannot Be Determined.     DISEASE ASSOCIATED GENOMIC FINDINGS:   KRAS p.G12C (NM_033360 c.34G>T)     INTERPRETATION AND POTENTIAL THERAPEUTIC OPTIONS:  KRAS p.G12C  FDA RECOMMENDATIONS (Advanced Non Small Cell Lung Cancer):  adagrasib (Subsequent)  sotorasib (Subsequent)Specimen: FFPE, [id number] H7    Specimen:  FFPE, [id number] [id number]  (1.4 cm adenocarcinoma)  (PD-L1 TPS < 1%     MICROSATELLITE STATUS: Microsatellite Instability-High (MSI-H) is NOT DETECTED.  DISEASE ASSOCIATED GENOMIC FINDINGS: Not Detected.    Specimen: FFPE, [id number] G5 (2.4 cm adenocarcinoma)  Estimated Tumor Content: 30%     MICROSATELLITE STATUS: Microsatellite Instability-High (MSI-H) is NOT DETECTED.     DISEASE ASSOCIATED GENOMIC FINDINGS:   KRAS p.G12C (NM_033360 c.34G>T)     INTERPRETATION AND POTENTIAL THERAPEUTIC OPTIONS:  KRAS p.G12C  FDA RECOMMENDATIONS (Advanced Non Small Cell Lung Cancer):  adagrasib (Subsequent)  sotorasib  (Subsequent)       PRIOR THERAPIES  2016: RML lobectomy for nB1dqL8 well differentiated adenocarcinoma  2020: Hypofractionated RT (60 Gy over 15f) to the RUL due to close proximity to esophagus/trachea  01/22/24: right VATS for RUL lobectomy and RLL superior segmentectomy      CURRENT THERAPY  Recommended 4 cycles of adjuvant platinum/pemetrexed followed by 1 year of consolidative pembrolizumab     CURRENT ONCOLOGICAL PROBLEMS  None      HISTORY OF PRESENT ILLNESS  This is a former 60 pack-year smoker (quit 2001) who underwent a RML lobectomy on 06/09/2016 for pT1bN0 well differentiated adenocarcinoma. In 2020 he developed a new RUL nodule that was biopsied and found to be a new lepidic well differentiated invasive adenocarcinoma. A PET scan obtained on 03/05/20 showed no evidence of extra-thoracic disease and he was treated with hypofractionated radiation from 4/21/2020 to 5/11/2020.     He continued to be followed by his surgeon, Dr. Stephan Bronson. Most recently he noted a change in the previously treated RUL adenocarcinoma with RT and a bronchoscopy was performed on 12/08/23:  Final Cytological Interpretation   A. LUNG FINE NEEDLE ASPIRATION RIGHT LOWER LOBE - RLL nodule                Nondiagnostic specimen due to insufficient cellularity                Blood and bronchial cells.  B. BRONCHIAL BRUSH RIGHT LOWER LOBE                 Nondiagnostic specimen due to insufficient cellularity                Blood and bronchial cells.  C. LYMPH NODE 4 R PULMONARY FINE NEEDLE ASPIRATION                 Malignant cells derived from well differentiated adenocarcinoma  Molecular testing has been ordered and results will be issued in a separate report.  The cell block contains high tumor cellularity representing roughly 30% of all nucleated cells.   D. LYMPH NODE 7 PULMONARY FINE NEEDLE ASPIRATION                 No malignant cells identified                Lymphoid sample  E. LYMPH NODE 11 Rs PULMONARY FINE NEEDLE  ASPIRATION                 No malignant cells identified                Lymphoid sample  F. LUNG FINE NEEDLE ASPIRATION RIGHT UPPER LOBE - RUL Nodule                Rare cluster of atypical cells, favor squamous metaplasia.                Lymphoid sample                     Case was presented at [facility] on 12/23/23 and recommended to proceed with surgical resection.    On 01/22/24, underwent a VAT with RUL lobectomy and RLL superior segmentectomy.     FINAL DIAGNOSIS   A. LYMPH NODE, LEVEL 9; BIOPSY:   -- One lymph node, negative for metastasis (0/1)     B. LYMPH NODE, LEVEL 7; BIOPSY:   -- One lymph node, negative for metastasis (0/1)     C. LYMPH NODE, LEVEL 11RS; BIOPSY:   -- One lymph node, negative for metastasis (0/1)     D. LYMPH NODE, LEVEL 10; BIOPSY:   -- One lymph node, negative for metastasis (0/1)     E. LYMPH NODE, LEVEL 11RI; BIOPSY:   -- One lymph node, negative for metastasis (0/1)     F. LYMPH NODE, LEVEL 12; BIOPSY:    -- One lymph node, negative for metastasis (0/1)     G. LUNG, RIGHT LOWER LOBE; SUPERIOR SEGMENTECTOMY:   -- Adenocarcinoma, 90% acinar and 10% lepidic patterns.  -- Tumor size: 2.7 cm in greatest dimension  -- No lymphovascular or pleural invasion is identified  -- Margin is free of tumor (but less than 1 mm far from the tumor)  NGS showed a KRAS G12C mutation - PD-L1 TPS 90%     H. LUNG, RIGHT UPPER LOBE; LOBECTOMY:   Three separate foci of adenocarcinomas:     Tumor#1              -- Adenocarcinoma, acinar 60% and lepidic 40% patterns              -- Tumor size: 1.0 cm in greatest dimension              -- No lymphovascular or pleural invasion identified  H4 - NGS not performed - PD-L1 TPS < 1%     Tumor#2              -- Adenocarcinoma, papillary 60%, acinar 30%, micropapillary 5%, and lepidic 5% patterns              -- Tumor size:1.4 cm in greatest dimension              -- No lymphovascular or pleural invasion identified  H5 NGS showed no disease relevant alterations - PD-L1 TPS <  1%     Tumor#3              -- Adenocarcinoma, acinar 50%, papillary %30 and lepidic 20% patterns              -- Tumor size: 1.2 cm in greatest dimension              -- No lymphovascular or pleural invasion identified  H7 showed a RGEKM36V mutation - PD-L1 TPS < 1%     -- Resection margins are free of tumor  -- Twelve lymph nodes, negative for metastasis (0/13)     I. LYMPH NODE, LEVEL 4R; BIOPSY:   -- One lymph node, negative for metastasis (0/1)        03/01/24: seen after VATS procedure and final path with genomics - recommended to proceed with 4 cycles of carboplatin/pemetrexed followed by 1 year of adjuvant pembrolizumab     03/06/24: Brain MRI shows no ICM      PAST MEDICAL HISTORY  Diverticulitis     SURGICAL HISTORY  RML lobectomy in 2016   Right VATS and RUL lobectomy and RLL wedge resection in 01/2024     SOCIAL HISTORY  Smoked 2 PPD x30 years, quit in 2001.   Drinks 2-3 nights a week. No illicit drug use.   Born in [place], now lives in [place]. Worked in a steel mill, retired in 2001. Worked at a [employer] and works at [employer] (formerly [employer]). Lives alone, 2 brothers and 1 sisters all local. No kids. Confucianist.      FAMILY HISTORY  No history of cancer     CURRENT MEDS REVIEWED       ALLERGIES REVIEWED        SUBJECTIVE:  Verbal consent obtained for this virtual visit -   He states he was back to the [place] over the past week   Still fatigued but making a lot of progress and shopping and cooking for himself   He spoke to friends and also Dr. Bronson and has made a decision to proceed with adjuvant chemotherapy but would like to be treated closer to home.    A 13 point review of systems was performed, with significant findings documented above in subjective history.    OBJECTIVE:  There were no vitals filed for this visit.     There is no height or weight on file to calculate BSA.     Wt Readings from Last 5 Encounters:   02/29/24 107 kg (236 lb 5.3 oz)   02/19/24 109 kg (240 lb)  "  02/07/24 109 kg (240 lb 3.2 oz)   01/29/24 110 kg (243 lb 3.2 oz)   01/16/24 111 kg (245 lb)       ECOGSCORE: 1    Physical Exam  Constitutional:       Appearance: Normal appearance.   HENT:      Head: Normocephalic and atraumatic.   Eyes:      General: No scleral icterus.     Extraocular Movements: Extraocular movements intact.      Conjunctiva/sclera: Conjunctivae normal.   Cardiovascular:      Rate and Rhythm: Normal rate and regular rhythm.   Pulmonary:      Effort: Pulmonary effort is normal.      Breath sounds: Normal breath sounds.   Abdominal:      Palpations: Abdomen is soft. There is no mass.      Tenderness: There is no abdominal tenderness. There is no guarding.   Musculoskeletal:      Right lower leg: No edema.      Left lower leg: No edema.   Skin:     General: Skin is warm.      Findings: No erythema or rash.   Neurological:      General: No focal deficit present.      Mental Status: He is alert and oriented to person, place, and time.      Motor: No weakness.   Psychiatric:         Mood and Affect: Mood normal.         Behavior: Behavior normal.         Thought Content: Thought content normal.         Judgment: Judgment normal.        Diagnostic Results   Results:  Labs:  Lab Results   Component Value Date    WBC 8.5 01/28/2024    HGB 12.7 (L) 01/28/2024    HCT 39.4 (L) 01/28/2024    MCV 88 01/28/2024     01/28/2024      Lab Results   Component Value Date    NEUTROABS 6.56 01/05/2020        Lab Results   Component Value Date    GLUCOSE 91 01/28/2024    CALCIUM 8.8 01/28/2024     01/28/2024    K 4.1 01/28/2024    CO2 21 01/28/2024     01/28/2024    BUN 14 01/28/2024    CREATININE 0.99 01/28/2024    MG 2.07 01/28/2024     Lab Results   Component Value Date    ALT 13 11/29/2023    AST 10 11/29/2023    ALKPHOS 66 11/29/2023    BILITOT 0.7 11/29/2023    BILIDIR 0.0 11/04/2022      No results found for: \"ACTH\", \"CORTISOL\", \"TSH\", \"FREET4\"      No images are attached to the encounter or " orders placed in the encounter.     Assessment/Plan   Mr. Tao Renee is a 73 y.o. male with history cigarette smoking and of multifocal adenocarcinoma of the lung -   He underwent a RML lobectomy on 06/09/2016 for pT1bN0 well differentiated adenocarcinoma. In 2020 he developed a new RUL nodule that was biopsied and found to be a new lepidic well differentiated invasive adenocarcinoma, with non-classic EGFR mutation. He was treated with hypofractionated radiation from 4/21/2020 to 5/11/2020.   Most recently developed new and worsening nodules in RUL and RLL and underwent a RUL lobectomy and RLL superior segmentectomy on 01/22/24. While the genomic profile of these tumors differed from prior in 2020, indicating distinct neoplasms, two of the nodules, one on the RUL and the RLL nodule shared a QIECN93I mutation, which is compatible with mpT4 N0 disease.   I had a long discussion with the patient today regarding the potential use of adjuvant systemic therapy consisting of 4 cycles of chemotherapy followed by one year of adjuvant immunotherapy. The largest tumor in the RLL has PD-L1 TPS 90%.   His brain MRI showed no ICM and he has made a decision to proceed with adjuvant chemotherapy followed by immunotherapy.  He would like to be treated closer to home and I have already reached out to Dr. Alexander to get this coordinated there.   I will be available to help in the future with any new questions or concerns regarding his care.      This note was created with the assistance of a speech recognition program.  Although the intention is to generate a document that actually reflects the content of the visit, it is possible that some mistakes occur and may not be corrected by the time of completion of this note.        Argenis Arguelles MD, MS  Thoracic Medical Oncology   [address] [place] [address]  Phone: [phone]

## 2024-03-13 ENCOUNTER — OFFICE VISIT (OUTPATIENT)
Dept: HEMATOLOGY/ONCOLOGY | Facility: CLINIC | Age: 74
End: 2024-03-13
Payer: MEDICARE

## 2024-03-13 VITALS
HEIGHT: 75 IN | BODY MASS INDEX: 29.33 KG/M2 | SYSTOLIC BLOOD PRESSURE: 151 MMHG | WEIGHT: 235.89 LBS | OXYGEN SATURATION: 97 % | DIASTOLIC BLOOD PRESSURE: 87 MMHG | RESPIRATION RATE: 20 BRPM | TEMPERATURE: 97.9 F | HEART RATE: 96 BPM

## 2024-03-13 DIAGNOSIS — C34.91 ADENOCARCINOMA OF RIGHT LUNG (MULTI): Primary | ICD-10-CM

## 2024-03-13 PROCEDURE — 99215 OFFICE O/P EST HI 40 MIN: CPT | Performed by: INTERNAL MEDICINE

## 2024-03-13 PROCEDURE — 99205 OFFICE O/P NEW HI 60 MIN: CPT | Performed by: INTERNAL MEDICINE

## 2024-03-13 PROCEDURE — 3077F SYST BP >= 140 MM HG: CPT | Performed by: INTERNAL MEDICINE

## 2024-03-13 PROCEDURE — 1126F AMNT PAIN NOTED NONE PRSNT: CPT | Performed by: INTERNAL MEDICINE

## 2024-03-13 PROCEDURE — 3079F DIAST BP 80-89 MM HG: CPT | Performed by: INTERNAL MEDICINE

## 2024-03-13 PROCEDURE — 1159F MED LIST DOCD IN RCRD: CPT | Performed by: INTERNAL MEDICINE

## 2024-03-13 PROCEDURE — 1157F ADVNC CARE PLAN IN RCRD: CPT | Performed by: INTERNAL MEDICINE

## 2024-03-13 PROCEDURE — 1036F TOBACCO NON-USER: CPT | Performed by: INTERNAL MEDICINE

## 2024-03-13 RX ORDER — ALBUTEROL SULFATE 0.83 MG/ML
3 SOLUTION RESPIRATORY (INHALATION) AS NEEDED
Status: CANCELLED | OUTPATIENT
Start: 2024-04-05

## 2024-03-13 RX ORDER — EPINEPHRINE 0.3 MG/.3ML
0.3 INJECTION SUBCUTANEOUS EVERY 5 MIN PRN
Status: CANCELLED | OUTPATIENT
Start: 2024-04-05

## 2024-03-13 RX ORDER — PROCHLORPERAZINE MALEATE 5 MG
10 TABLET ORAL EVERY 6 HOURS PRN
Status: CANCELLED | OUTPATIENT
Start: 2024-04-05

## 2024-03-13 RX ORDER — FAMOTIDINE 10 MG/ML
20 INJECTION INTRAVENOUS ONCE AS NEEDED
Status: CANCELLED | OUTPATIENT
Start: 2024-04-05

## 2024-03-13 RX ORDER — PALONOSETRON 0.05 MG/ML
0.25 INJECTION, SOLUTION INTRAVENOUS ONCE
Status: CANCELLED | OUTPATIENT
Start: 2024-04-05

## 2024-03-13 RX ORDER — CYANOCOBALAMIN 1000 UG/ML
1000 INJECTION, SOLUTION INTRAMUSCULAR; SUBCUTANEOUS ONCE
Status: CANCELLED | OUTPATIENT
Start: 2024-04-05

## 2024-03-13 RX ORDER — DIPHENHYDRAMINE HYDROCHLORIDE 50 MG/ML
50 INJECTION INTRAMUSCULAR; INTRAVENOUS AS NEEDED
Status: CANCELLED | OUTPATIENT
Start: 2024-04-05

## 2024-03-13 RX ORDER — PROCHLORPERAZINE EDISYLATE 5 MG/ML
10 INJECTION INTRAMUSCULAR; INTRAVENOUS EVERY 6 HOURS PRN
Status: CANCELLED | OUTPATIENT
Start: 2024-04-05

## 2024-03-13 ASSESSMENT — PAIN SCALES - GENERAL: PAINLEVEL: 0-NO PAIN

## 2024-03-13 NOTE — PROGRESS NOTES
Patient ID: : Tao Renee            Primary Care Provider: Rubio Tariq DO    LOCATION:  [facility]    REFERRING PHYSICIAN:  Dr. 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        a.  Initial diagnosis in June 2016.  S/p right middle lobe lobectomy. stage I (pT1bN0 ) (PDL-1 ~10%).         b.  New RUL lesion in 2020.  S/p local radiation therapy in April 2020.          c.  New RUL/RLL lesions noted on routine screening scans in December 2023         d.  S/p RUL lobectomy and RLL wedge resection in 01/2024 (KRAS G12C mutation/PDL-1 ~90%)        e.  Adjuvant therapy with platinum/pemetrexed x 4 cycles f/by consolidative pembrolizumab beginning Mar 2024    CHIEF COMPLAINTS:  Patient is in the clinic today for follow-up of lung cancer and for continuation of treatment and management of therapy related effects.    HISTORY:  Tao Renee is a 73 y.o. male with history of recurrent lung cancer.  He was initially diagnosed with right lung cancer in June 2016 and is s/p right middle lobe lobectomy with postoperative pathology results suggestive of stage I (pT1bN0 ) well-differentiated adenocarcinoma.  In 2020 he developed a new RUL nodule that was biopsied and found to be a new lepidic well differentiated invasive adenocarcinoma, with non-classic EGFR mutation.   A PET scan obtained on 03/05/20 showed no evidence of extra-thoracic disease and he was treated with hypofractionated radiation from 4/21/2020 to 5/11/2020.  During all this time he was followed closely by Dr. Bronson in CT surgery clinic and was noted to have new and worsening nodules in RUL and RLL in December 2023 and underwent a RUL lobectomy and RLL superior segmentectomy on 01/22/24.  Three different lesions were resected. While the genomic profile of these tumors differed from prior in 2020, indicating distinct neoplasms, two of the nodules, one on  "the RUL and the RLL nodule shared a [id number] mutation, which is compatible with mpT4 N0 disease.  One of the lesion had had a PD-L1 expression of 90%, others were negative.  He was evaluated by Dr. Arguelles in thoracic oncology clinic and has been advised adjuvant chemotherapy with carboplatin/Alimta x 4 cycles followed by 1 year of adjuvant immunotherapy.      INTERVAL HISTORY:  Patient denies any new complaints.  Recovering well from the surgery.  Still has some minimal chest discomfort and occasional cough.  No history of nausea, vomiting, fever, night sweats, diarrhea, rash, anorexia or weight loss. No recent changes in medications.    PAST MEDICAL HISTORY:  1.  Recurrent lung cancer as detailed above.  2.  Coronary artery disease  3.  Hyperlipidemia  4.  Anxiety  5.  Thoracic aortic aneurysm  6.  BPH  7.  GERD  8.  Sleep apnea  9.  History of bowel resection for diverticular disease in  and colonic polypectomy.    SOCIAL HISTORY:  He is single and lives alone in [place].  Quit smoking in .  1 to 2 pack a day for 30 years prior smoking history.  Admit to social alcohol intake.  As per patient he was a heavy drinker but has cut down considerably.  He is retired and used to work in steel mill.  Currently work part-time at [employer] in [place] [employer].  Born and raised in [place].    FAMILY HISTORY:  Father  at age 92 from C. difficile related complication.  Mother  at age 89 from lower extremity vascular issues.  3 siblings all alive and well.  He does not have any children.  No other family history of bleeding, clotting or malignant disorders in the family history.    REVIEW OF SYSTEMS:   Pertinent finding as per the history above.  All other systems have been reviewed and generally negative and noncontributory.    VITAL SIGNS  BSA: 2.38 meters squared  /87   Pulse 96   Temp 36.6 °C (97.9 °F) (Temporal)   Resp 20   Ht 1.9 m (6' 2.8\")   Wt 107 kg (235 lb " 14.3 oz)   SpO2 97%   BMI 29.64 kg/m²     PHYSICAL EXAMINATION:  Detailed physical examination not done.    LAB DATA:  CBC and metabolic profile was unremarkable on 1/28/2024 with baseline hemoglobin of 12.7 and creatinine of 0.9.    RADIOLOGICAL DATA:  MRI brain was unremarkable on 3/6/2024.  CT chest results from December 2023 were reviewed and have been detailed in the history above.    PATHOLOGY DATA:  Surgical Pathology Exam: [id number]   A. LYMPH NODE, LEVEL 9; BIOPSY: -- One lymph node, negative for metastasis (0/1)  B. LYMPH NODE, LEVEL 7; BIOPSY:  -- One lymph node, negative for metastasis (0/1)  C. LYMPH NODE, LEVEL 11RS; BIOPSY:  -- One lymph node, negative for metastasis (0/1)  D. LYMPH NODE, LEVEL 10; BIOPSY:  -- One lymph node, negative for metastasis (0/1)  E. LYMPH NODE, LEVEL 11RI; BIOPSY:  -- One lymph node, negative for metastasis (0/1)  F. LYMPH NODE, LEVEL 12; BIOPSY:   -- One lymph node, negative for metastasis (0/1)  G. LUNG, RIGHT LOWER LOBE; SUPERIOR SEGMENTECTOMY:   -- Adenocarcinoma, 90% acinar and 10% lepidic patterns.  -- Tumor size: 2.7 cm in greatest dimension  -- No lymphovascular or pleural invasion is identified  -- Margin is free of tumor (but less than 1 mm far from the tumor)     H. LUNG, RIGHT UPPER LOBE; LOBECTOMY:   Three separate foci of adenocarcinomas:  Tumor#1              -- Adenocarcinoma, acinar 60% and lepidic 40% patterns              -- Tumor size: 1.0 cm in greatest dimension              -- No lymphovascular or pleural invasion identified  Tumor#2              -- Adenocarcinoma, papillary 60%, acinar 30%, micropapillary 5%, and lepidic 5% patterns              -- Tumor size:1.4 cm in greatest dimension              -- No lymphovascular or pleural invasion identified  Tumor#3              -- Adenocarcinoma, acinar 50%, papillary %30 and lepidic 20% patterns              -- Tumor size: 1.2 cm in greatest dimension              -- No lymphovascular or pleural  invasion identified   -- Resection margins are free of tumor  -- Twelve lymph nodes, negative for metastasis (0/13)     I. LYMPH NODE, LEVEL 4R; BIOPSY:   -- One lymph node, negative for metastasis (0/1)  PD-L1 expression  Block used:  H4 Interpretation: Negative  Tumor Proportion Score (TPS): <1%  Block used:  H5 Interpretation: Negative Tumor Proportion Score (TPS): <1%  Block used:  H7 Interpretation: Positive Tumor Proportion Score (TPS): 90%  Block used:  G5 Interpretation: Negative Tumor Proportion Score (TPS): <1%    Specimen: FFPE, [id number] [id number]   DISEASE ASSOCIATED GENOMIC FINDINGS:   KRAS p.G12C (NM_033360 c.34G>T)  Note: The current specimen [id number], 'LUNG, RIGHT UPPER LOBE, Nodule #3' shares the same KRAS G12C mutation as the previous specimen, [id number] G5, 'LUNG, RIGHT LOWER LOBE' collected on 01/22/2024.     Specimen: FFPE, [id number] [id number]  Estimated Tumor Content: 40%  MICROSATELLITE STATUS: Microsatellite Instability-High (MSI-H) is NOT DETECTED.  DISEASE ASSOCIATED GENOMIC FINDINGS: Not Detected.    Specimen: FFPE, [id number] G5  Estimated Tumor Content: 30%  MICROSATELLITE STATUS: Microsatellite Instability-High (MSI-H) is NOT DETECTED.  DISEASE ASSOCIATED GENOMIC FINDINGS:   KRAS p.G12C (NM_033360 c.34G>T)  Note: The genomic findings in the current specimen 'LUNG, RIGHT UPPER LOBE' are different than those detected in previously tested specimen, [id number] ([id number]), from the 'RIGHT LUNG' collected on 02/27/2020. If consistent with other clinicopathological findings, the dissimilarity in genomic findings supports that the two lesions are different in origin.    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  Please refer to the details of initial   presentation and management as outlined above.  In summary, was initially diagnosed with right lung cancer in June 2016 and is s/p right middle lobe lobectomy with postoperative pathology results suggestive  of stage I (pT1bN0 ) well-differentiated adenocarcinoma.  In 2020 he developed a new RUL nodule that was biopsied and found to be a new lepidic well differentiated invasive adenocarcinoma, with non-classic EGFR mutation.   A PET scan obtained on 03/05/20 showed no evidence of extra-thoracic disease and he was treated with hypofractionated radiation from 4/21/2020 to 5/11/2020.  During all this time he was followed closely by Dr. Bronson in CT surgery clinic and was noted to have new and worsening nodules in RUL and RLL in December 2023 and underwent a RUL lobectomy and RLL superior segmentectomy on 01/22/24.  Three different lesions were resected. While the genomic profile of these tumors differed from prior in 2020, indicating distinct neoplasms, two of the nodules, one on the RUL and the RLL nodule shared a [id number] mutation, which is compatible with mpT4 N0 disease.  One of the lesion had had a PD-L1 expression of 90%, others were negative.  He was evaluated by Dr. Arguelles in thoracic oncology clinic and has been advised adjuvant chemotherapy with carboplatin/Alimta x 4 cycles followed by 1 year of adjuvant immunotherapy.     Long discussion with the patient and he is explained about the tumor diagnosis, the staging, prognosis and different treatment and management options.  I reviewed the role and benefit of adjuvant chemotherapy and cutting down the chances of recurrence and improving the survival.  After initial reluctance he is agreeable for trial of chemotherapy and we will try to get him started on carboplatin/Alimta combination in the next few weeks.  Probable side effects of myelosuppression, weakness, fatigue, nephrotoxicity's etc. were explained to him in detail.  We will recheck the scans after 4 cycles and thereafter will start him on maintenance immunotherapy with pembrolizumab for total of 1 year.      2.  Follow-up.  He will return to clinic in few weeks.  A total of 60 minutes were spent in evaluation  - performing physical examination, history taking, review of the previous extensive records/information and formulating current and future management plan. Majority of the time was spend in consultation and discussion.    This note has been transcribed using Dragon voice recognition system and there is a possibility of unintentional typing misprints.

## 2024-03-14 ENCOUNTER — OFFICE VISIT (OUTPATIENT)
Dept: PRIMARY CARE | Facility: CLINIC | Age: 74
End: 2024-03-14
Payer: MEDICARE

## 2024-03-14 VITALS
HEART RATE: 88 BPM | WEIGHT: 240 LBS | SYSTOLIC BLOOD PRESSURE: 146 MMHG | HEIGHT: 75 IN | OXYGEN SATURATION: 98 % | BODY MASS INDEX: 29.84 KG/M2 | DIASTOLIC BLOOD PRESSURE: 81 MMHG

## 2024-03-14 DIAGNOSIS — J40 BRONCHITIS: Primary | ICD-10-CM

## 2024-03-14 DIAGNOSIS — F41.9 ANXIETY: ICD-10-CM

## 2024-03-14 DIAGNOSIS — C34.91 ADENOCARCINOMA OF RIGHT LUNG (MULTI): ICD-10-CM

## 2024-03-14 DIAGNOSIS — I10 HYPERTENSION, UNSPECIFIED TYPE: ICD-10-CM

## 2024-03-14 PROCEDURE — 3077F SYST BP >= 140 MM HG: CPT | Performed by: FAMILY MEDICINE

## 2024-03-14 PROCEDURE — 1159F MED LIST DOCD IN RCRD: CPT | Performed by: FAMILY MEDICINE

## 2024-03-14 PROCEDURE — 99215 OFFICE O/P EST HI 40 MIN: CPT | Performed by: FAMILY MEDICINE

## 2024-03-14 PROCEDURE — 1036F TOBACCO NON-USER: CPT | Performed by: FAMILY MEDICINE

## 2024-03-14 PROCEDURE — 3079F DIAST BP 80-89 MM HG: CPT | Performed by: FAMILY MEDICINE

## 2024-03-14 RX ORDER — ALPRAZOLAM 0.5 MG/1
0.5 TABLET ORAL NIGHTLY PRN
Qty: 30 TABLET | Refills: 1 | Status: SHIPPED | OUTPATIENT
Start: 2024-03-14

## 2024-03-14 RX ORDER — DOXYCYCLINE 100 MG/1
100 CAPSULE ORAL 2 TIMES DAILY
Qty: 14 CAPSULE | Refills: 1 | Status: SHIPPED | OUTPATIENT
Start: 2024-03-14 | End: 2024-03-28

## 2024-03-14 NOTE — PROGRESS NOTES
Subjective   Patient ID: Tao Renee is a 73 y.o. male who presents for Cyst (Right elbow).  HPI  I can do anything with it is small in nature not causing him any problems and he is got chemotherapy coming up for lung cancer.    Patient recently was hospitalized with lung cancer had a surgical procedure removed for cancer also has a history of some COPD.  This point we talked at length about initiating chemotherapy I think at this point I think he is relatively healthy as not really tried the chemotherapy so I told him can always stop it if it starts to bother him we can take it from there.  Review of Systems   Constitutional: Negative.    HENT: Negative.     Respiratory:  Positive for shortness of breath. Negative for apnea, cough and choking.    Cardiovascular: Negative.    Gastrointestinal: Negative.    Genitourinary: Negative.    Musculoskeletal: Negative.    Neurological: Negative.        Objective   Physical Exam  General no acute process no icterus well-hydrated alert active oriented    HEENT normocephalic no palpable tenderness eyes pupils equal reactive light and accommodation extraocular muscles intact no icterus and/or erythema ears benign external auditory canal no gross deformities nose no discharge drainage erythema bleeding throat no erythema.    Heart regular rate and rhythm without S3-S4 or murmur    Lungs clear to auscultation x2 no rales or rhonchi diminished pulmonary sounds    Abdomen soft nontender nondistended no palpable masses no organomegaly splenomegaly.    Integument no rash no lumps bumps or concerning lesions.    Neurologic no tics tremors or seizures no decreased range of motion or ataxia.    Musculoskeletal good range of motion no gross abnormalities noted  Assessment/Plan            Rubio Tariq,  03/14/24 1:15 PM

## 2024-03-18 ENCOUNTER — APPOINTMENT (OUTPATIENT)
Dept: PRIMARY CARE | Facility: CLINIC | Age: 74
End: 2024-03-18
Payer: MEDICARE

## 2024-03-23 ASSESSMENT — ENCOUNTER SYMPTOMS
GASTROINTESTINAL NEGATIVE: 1
COUGH: 0
CHOKING: 0
MUSCULOSKELETAL NEGATIVE: 1
NEUROLOGICAL NEGATIVE: 1
CONSTITUTIONAL NEGATIVE: 1
APNEA: 0
SHORTNESS OF BREATH: 1
CARDIOVASCULAR NEGATIVE: 1

## 2024-03-26 ENCOUNTER — TELEPHONE (OUTPATIENT)
Dept: HEMATOLOGY/ONCOLOGY | Facility: CLINIC | Age: 74
End: 2024-03-26
Payer: MEDICARE

## 2024-03-26 NOTE — TELEPHONE ENCOUNTER
Phoned patient that treatment is approved by insurance. Will come on 4.3 or 4.4 for lab work and start treatment on 4.5 @ 0900.

## 2024-04-03 ENCOUNTER — LAB (OUTPATIENT)
Dept: LAB | Facility: CLINIC | Age: 74
End: 2024-04-03
Payer: MEDICARE

## 2024-04-03 DIAGNOSIS — C34.91 ADENOCARCINOMA OF RIGHT LUNG (MULTI): ICD-10-CM

## 2024-04-03 LAB
ALBUMIN SERPL BCP-MCNC: 3.7 G/DL (ref 3.4–5)
ALP SERPL-CCNC: 65 U/L (ref 33–136)
ALT SERPL W P-5'-P-CCNC: 11 U/L (ref 10–52)
ANION GAP SERPL CALC-SCNC: 13 MMOL/L (ref 10–20)
AST SERPL W P-5'-P-CCNC: 7 U/L (ref 9–39)
BASOPHILS # BLD AUTO: 0.05 X10*3/UL (ref 0–0.1)
BASOPHILS NFR BLD AUTO: 0.5 %
BILIRUB SERPL-MCNC: 0.7 MG/DL (ref 0–1.2)
BUN SERPL-MCNC: 11 MG/DL (ref 6–23)
CALCIUM SERPL-MCNC: 9 MG/DL (ref 8.6–10.3)
CHLORIDE SERPL-SCNC: 105 MMOL/L (ref 98–107)
CO2 SERPL-SCNC: 24 MMOL/L (ref 21–32)
CREAT SERPL-MCNC: 1.1 MG/DL (ref 0.5–1.3)
EGFRCR SERPLBLD CKD-EPI 2021: 70 ML/MIN/1.73M*2
EOSINOPHIL # BLD AUTO: 0.13 X10*3/UL (ref 0–0.4)
EOSINOPHIL NFR BLD AUTO: 1.3 %
ERYTHROCYTE [DISTWIDTH] IN BLOOD BY AUTOMATED COUNT: 13.3 % (ref 11.5–14.5)
GLUCOSE SERPL-MCNC: 86 MG/DL (ref 74–99)
HCT VFR BLD AUTO: 47.9 % (ref 41–52)
HGB BLD-MCNC: 15.5 G/DL (ref 13.5–17.5)
IMM GRANULOCYTES # BLD AUTO: 0.03 X10*3/UL (ref 0–0.5)
IMM GRANULOCYTES NFR BLD AUTO: 0.3 % (ref 0–0.9)
LYMPHOCYTES # BLD AUTO: 1.89 X10*3/UL (ref 0.8–3)
LYMPHOCYTES NFR BLD AUTO: 18.9 %
MCH RBC QN AUTO: 28.3 PG (ref 26–34)
MCHC RBC AUTO-ENTMCNC: 32.4 G/DL (ref 32–36)
MCV RBC AUTO: 87 FL (ref 80–100)
MONOCYTES # BLD AUTO: 0.95 X10*3/UL (ref 0.05–0.8)
MONOCYTES NFR BLD AUTO: 9.5 %
NEUTROPHILS # BLD AUTO: 6.93 X10*3/UL (ref 1.6–5.5)
NEUTROPHILS NFR BLD AUTO: 69.5 %
NRBC BLD-RTO: 0 /100 WBCS (ref 0–0)
PLATELET # BLD AUTO: 281 X10*3/UL (ref 150–450)
POTASSIUM SERPL-SCNC: 4.8 MMOL/L (ref 3.5–5.3)
PROT SERPL-MCNC: 6.6 G/DL (ref 6.4–8.2)
RBC # BLD AUTO: 5.48 X10*6/UL (ref 4.5–5.9)
SODIUM SERPL-SCNC: 137 MMOL/L (ref 136–145)
WBC # BLD AUTO: 10 X10*3/UL (ref 4.4–11.3)

## 2024-04-03 PROCEDURE — 80053 COMPREHEN METABOLIC PANEL: CPT | Performed by: INTERNAL MEDICINE

## 2024-04-03 PROCEDURE — 85025 COMPLETE CBC W/AUTO DIFF WBC: CPT | Performed by: INTERNAL MEDICINE

## 2024-04-03 PROCEDURE — 36415 COLL VENOUS BLD VENIPUNCTURE: CPT

## 2024-04-05 ENCOUNTER — INFUSION (OUTPATIENT)
Dept: HEMATOLOGY/ONCOLOGY | Facility: CLINIC | Age: 74
End: 2024-04-05
Payer: MEDICARE

## 2024-04-05 VITALS
BODY MASS INDEX: 30.81 KG/M2 | TEMPERATURE: 97.9 F | HEIGHT: 74 IN | WEIGHT: 240.08 LBS | OXYGEN SATURATION: 97 % | DIASTOLIC BLOOD PRESSURE: 69 MMHG | RESPIRATION RATE: 18 BRPM | SYSTOLIC BLOOD PRESSURE: 126 MMHG | HEART RATE: 70 BPM

## 2024-04-05 DIAGNOSIS — C34.91 ADENOCARCINOMA OF RIGHT LUNG (MULTI): Primary | ICD-10-CM

## 2024-04-05 PROCEDURE — 96367 TX/PROPH/DG ADDL SEQ IV INF: CPT

## 2024-04-05 PROCEDURE — 96413 CHEMO IV INFUSION 1 HR: CPT

## 2024-04-05 PROCEDURE — 96372 THER/PROPH/DIAG INJ SC/IM: CPT

## 2024-04-05 PROCEDURE — 96372 THER/PROPH/DIAG INJ SC/IM: CPT | Mod: 59 | Performed by: INTERNAL MEDICINE

## 2024-04-05 PROCEDURE — 96411 CHEMO IV PUSH ADDL DRUG: CPT

## 2024-04-05 PROCEDURE — 96375 TX/PRO/DX INJ NEW DRUG ADDON: CPT | Mod: INF

## 2024-04-05 PROCEDURE — 2500000004 HC RX 250 GENERAL PHARMACY W/ HCPCS (ALT 636 FOR OP/ED): Performed by: INTERNAL MEDICINE

## 2024-04-05 RX ORDER — HEPARIN SODIUM,PORCINE/PF 10 UNIT/ML
50 SYRINGE (ML) INTRAVENOUS AS NEEDED
Status: CANCELLED | OUTPATIENT
Start: 2024-04-05

## 2024-04-05 RX ORDER — CYANOCOBALAMIN 1000 UG/ML
1000 INJECTION, SOLUTION INTRAMUSCULAR; SUBCUTANEOUS ONCE
Status: COMPLETED | OUTPATIENT
Start: 2024-04-05 | End: 2024-04-05

## 2024-04-05 RX ORDER — PALONOSETRON 0.05 MG/ML
0.25 INJECTION, SOLUTION INTRAVENOUS ONCE
Status: COMPLETED | OUTPATIENT
Start: 2024-04-05 | End: 2024-04-05

## 2024-04-05 RX ORDER — HEPARIN 100 UNIT/ML
500 SYRINGE INTRAVENOUS AS NEEDED
Status: CANCELLED | OUTPATIENT
Start: 2024-04-05

## 2024-04-05 RX ORDER — PROCHLORPERAZINE MALEATE 10 MG
10 TABLET ORAL EVERY 6 HOURS PRN
Qty: 30 TABLET | Refills: 2 | Status: SHIPPED | OUTPATIENT
Start: 2024-04-05

## 2024-04-05 RX ORDER — ONDANSETRON HYDROCHLORIDE 8 MG/1
8 TABLET, FILM COATED ORAL EVERY 8 HOURS PRN
Qty: 30 TABLET | Refills: 2 | Status: SHIPPED | OUTPATIENT
Start: 2024-04-05

## 2024-04-05 RX ADMIN — CYANOCOBALAMIN 1000 MCG: 1000 INJECTION INTRAMUSCULAR; SUBCUTANEOUS at 09:52

## 2024-04-05 RX ADMIN — DEXAMETHASONE SODIUM PHOSPHATE 12 MG: 10 INJECTION, SOLUTION INTRAMUSCULAR; INTRAVENOUS at 10:01

## 2024-04-05 RX ADMIN — CARBOPLATIN 557 MG: 10 INJECTION, SOLUTION INTRAVENOUS at 11:44

## 2024-04-05 RX ADMIN — SODIUM CHLORIDE 150 MG: 9 INJECTION, SOLUTION INTRAVENOUS at 10:24

## 2024-04-05 RX ADMIN — PEMETREXED DISODIUM 1200 MG: 500 INJECTION, POWDER, LYOPHILIZED, FOR SOLUTION INTRAVENOUS at 11:13

## 2024-04-05 RX ADMIN — PALONOSETRON 250 MCG: 0.05 INJECTION, SOLUTION INTRAVENOUS at 09:55

## 2024-04-05 ASSESSMENT — PATIENT HEALTH QUESTIONNAIRE - PHQ9
2. FEELING DOWN, DEPRESSED OR HOPELESS: NOT AT ALL
SUM OF ALL RESPONSES TO PHQ9 QUESTIONS 1 AND 2: 0
1. LITTLE INTEREST OR PLEASURE IN DOING THINGS: NOT AT ALL

## 2024-04-05 ASSESSMENT — PAIN SCALES - GENERAL: PAINLEVEL: 2

## 2024-04-05 NOTE — SIGNIFICANT EVENT
04/05/24 0931   Prechemo Checklist   Has the patient been in the hospital, ED, or urgent care since last date of service No   Chemo/Immuno Consent Signed Yes  (3/13/2024)   Protocol/Indications Verified Yes   Confirmed to previous date/time of medication N/A   Compared to previous dose N/A   All medications are dated accurately Yes   Pregnancy Test Negative Not applicable   Parameters Met Yes   BSA/Weight-Height Verified Yes   Dose Calculations Verified (current, total, cumulative) Yes        sentences/words

## 2024-04-07 DIAGNOSIS — K21.9 GASTROESOPHAGEAL REFLUX DISEASE WITHOUT ESOPHAGITIS: Primary | ICD-10-CM

## 2024-04-07 RX ORDER — PANTOPRAZOLE SODIUM 20 MG/1
20 TABLET, DELAYED RELEASE ORAL
Qty: 30 TABLET | Refills: 11 | Status: SHIPPED | OUTPATIENT
Start: 2024-04-07 | End: 2024-06-06 | Stop reason: WASHOUT

## 2024-04-09 ENCOUNTER — TELEPHONE (OUTPATIENT)
Dept: HEMATOLOGY/ONCOLOGY | Facility: CLINIC | Age: 74
End: 2024-04-09
Payer: MEDICARE

## 2024-04-09 NOTE — TELEPHONE ENCOUNTER
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 Reviewed need to stay covered when he is outside and to not be out between 10am and 2pm. Wear sunscreen when able to. Pt verbalizes understanding and is wearing long sleeves and a hat. Also had a positive reaction to Senokot. Will call with any other concerns.

## 2024-04-11 PROBLEM — I71.20 THORACIC AORTIC ANEURYSM (TAA) (CMS-HCC): Chronic | Status: RESOLVED | Noted: 2023-09-06 | Resolved: 2024-04-11

## 2024-04-11 PROBLEM — R91.1 SOLITARY PULMONARY NODULE: Status: ACTIVE | Noted: 2023-11-29

## 2024-04-11 PROBLEM — K57.92 DIVERTICULITIS: Status: RESOLVED | Noted: 2023-09-06 | Resolved: 2024-04-11

## 2024-04-24 PROBLEM — I71.21 ANEURYSM OF ASCENDING AORTA WITHOUT RUPTURE (CMS-HCC): Status: ACTIVE | Noted: 2023-09-06

## 2024-04-24 PROBLEM — I71.21 ANEURYSM OF ASCENDING AORTA WITHOUT RUPTURE (CMS-HCC): Chronic | Status: ACTIVE | Noted: 2023-09-06

## 2024-04-24 PROBLEM — I10 HTN (HYPERTENSION): Chronic | Status: ACTIVE | Noted: 2023-09-06

## 2024-04-24 RX ORDER — EPINEPHRINE 0.3 MG/.3ML
0.3 INJECTION SUBCUTANEOUS EVERY 5 MIN PRN
Status: CANCELLED | OUTPATIENT
Start: 2024-04-26

## 2024-04-24 RX ORDER — DIPHENHYDRAMINE HYDROCHLORIDE 50 MG/ML
50 INJECTION INTRAMUSCULAR; INTRAVENOUS AS NEEDED
Status: CANCELLED | OUTPATIENT
Start: 2024-04-26

## 2024-04-24 RX ORDER — FAMOTIDINE 10 MG/ML
20 INJECTION INTRAVENOUS ONCE AS NEEDED
Status: CANCELLED | OUTPATIENT
Start: 2024-04-26

## 2024-04-24 RX ORDER — ALBUTEROL SULFATE 0.83 MG/ML
3 SOLUTION RESPIRATORY (INHALATION) AS NEEDED
Status: CANCELLED | OUTPATIENT
Start: 2024-04-26

## 2024-04-24 RX ORDER — PROCHLORPERAZINE EDISYLATE 5 MG/ML
10 INJECTION INTRAMUSCULAR; INTRAVENOUS EVERY 6 HOURS PRN
Status: CANCELLED | OUTPATIENT
Start: 2024-04-26

## 2024-04-24 RX ORDER — PROCHLORPERAZINE MALEATE 10 MG
10 TABLET ORAL EVERY 6 HOURS PRN
Status: CANCELLED | OUTPATIENT
Start: 2024-04-26

## 2024-04-25 ENCOUNTER — LAB (OUTPATIENT)
Dept: LAB | Facility: CLINIC | Age: 74
End: 2024-04-25
Payer: MEDICARE

## 2024-04-25 ENCOUNTER — OFFICE VISIT (OUTPATIENT)
Dept: HEMATOLOGY/ONCOLOGY | Facility: CLINIC | Age: 74
End: 2024-04-25
Payer: MEDICARE

## 2024-04-25 ENCOUNTER — OFFICE VISIT (OUTPATIENT)
Dept: CARDIOLOGY | Facility: CLINIC | Age: 74
End: 2024-04-25
Payer: MEDICARE

## 2024-04-25 VITALS
SYSTOLIC BLOOD PRESSURE: 105 MMHG | HEART RATE: 92 BPM | RESPIRATION RATE: 20 BRPM | DIASTOLIC BLOOD PRESSURE: 58 MMHG | BODY MASS INDEX: 30.27 KG/M2 | HEIGHT: 74 IN | WEIGHT: 235.89 LBS | TEMPERATURE: 98.2 F | OXYGEN SATURATION: 97 %

## 2024-04-25 VITALS
OXYGEN SATURATION: 98 % | WEIGHT: 237 LBS | SYSTOLIC BLOOD PRESSURE: 110 MMHG | DIASTOLIC BLOOD PRESSURE: 68 MMHG | BODY MASS INDEX: 30.1 KG/M2 | HEART RATE: 92 BPM

## 2024-04-25 DIAGNOSIS — I25.10 ARTERIOSCLEROSIS OF CORONARY ARTERY: Primary | Chronic | ICD-10-CM

## 2024-04-25 DIAGNOSIS — I10 PRIMARY HYPERTENSION: Chronic | ICD-10-CM

## 2024-04-25 DIAGNOSIS — C34.91 ADENOCARCINOMA OF RIGHT LUNG (MULTI): Primary | ICD-10-CM

## 2024-04-25 DIAGNOSIS — C34.91 ADENOCARCINOMA OF RIGHT LUNG (MULTI): ICD-10-CM

## 2024-04-25 DIAGNOSIS — I10 HTN (HYPERTENSION): Primary | Chronic | ICD-10-CM

## 2024-04-25 DIAGNOSIS — E78.2 MIXED HYPERLIPIDEMIA: Chronic | ICD-10-CM

## 2024-04-25 LAB
ALBUMIN SERPL BCP-MCNC: 3.7 G/DL (ref 3.4–5)
ALP SERPL-CCNC: 68 U/L (ref 33–136)
ALT SERPL W P-5'-P-CCNC: 18 U/L (ref 10–52)
ANION GAP SERPL CALC-SCNC: 10 MMOL/L (ref 10–20)
AST SERPL W P-5'-P-CCNC: 8 U/L (ref 9–39)
BASOPHILS # BLD AUTO: 0.03 X10*3/UL (ref 0–0.1)
BASOPHILS NFR BLD AUTO: 0.5 %
BILIRUB SERPL-MCNC: 0.6 MG/DL (ref 0–1.2)
BUN SERPL-MCNC: 13 MG/DL (ref 6–23)
CALCIUM SERPL-MCNC: 9 MG/DL (ref 8.6–10.3)
CHLORIDE SERPL-SCNC: 107 MMOL/L (ref 98–107)
CO2 SERPL-SCNC: 24 MMOL/L (ref 21–32)
CREAT SERPL-MCNC: 1.09 MG/DL (ref 0.5–1.3)
EGFRCR SERPLBLD CKD-EPI 2021: 71 ML/MIN/1.73M*2
EOSINOPHIL # BLD AUTO: 0.05 X10*3/UL (ref 0–0.4)
EOSINOPHIL NFR BLD AUTO: 0.9 %
ERYTHROCYTE [DISTWIDTH] IN BLOOD BY AUTOMATED COUNT: 13.9 % (ref 11.5–14.5)
GLUCOSE SERPL-MCNC: 99 MG/DL (ref 74–99)
HCT VFR BLD AUTO: 46.6 % (ref 41–52)
HGB BLD-MCNC: 15.1 G/DL (ref 13.5–17.5)
HOLD SPECIMEN: NORMAL
HOLD SPECIMEN: NORMAL
IMM GRANULOCYTES # BLD AUTO: 0.02 X10*3/UL (ref 0–0.5)
IMM GRANULOCYTES NFR BLD AUTO: 0.4 % (ref 0–0.9)
LYMPHOCYTES # BLD AUTO: 1.46 X10*3/UL (ref 0.8–3)
LYMPHOCYTES NFR BLD AUTO: 25.6 %
MCH RBC QN AUTO: 28.2 PG (ref 26–34)
MCHC RBC AUTO-ENTMCNC: 32.4 G/DL (ref 32–36)
MCV RBC AUTO: 87 FL (ref 80–100)
MONOCYTES # BLD AUTO: 0.71 X10*3/UL (ref 0.05–0.8)
MONOCYTES NFR BLD AUTO: 12.4 %
NEUTROPHILS # BLD AUTO: 3.44 X10*3/UL (ref 1.6–5.5)
NEUTROPHILS NFR BLD AUTO: 60.2 %
NRBC BLD-RTO: 0 /100 WBCS (ref 0–0)
PLATELET # BLD AUTO: 248 X10*3/UL (ref 150–450)
POTASSIUM SERPL-SCNC: 4.6 MMOL/L (ref 3.5–5.3)
PROT SERPL-MCNC: 6.4 G/DL (ref 6.4–8.2)
RBC # BLD AUTO: 5.35 X10*6/UL (ref 4.5–5.9)
SODIUM SERPL-SCNC: 136 MMOL/L (ref 136–145)
WBC # BLD AUTO: 5.7 X10*3/UL (ref 4.4–11.3)

## 2024-04-25 PROCEDURE — 85025 COMPLETE CBC W/AUTO DIFF WBC: CPT | Performed by: INTERNAL MEDICINE

## 2024-04-25 PROCEDURE — 1157F ADVNC CARE PLAN IN RCRD: CPT | Performed by: INTERNAL MEDICINE

## 2024-04-25 PROCEDURE — 1160F RVW MEDS BY RX/DR IN RCRD: CPT | Performed by: INTERNAL MEDICINE

## 2024-04-25 PROCEDURE — 1126F AMNT PAIN NOTED NONE PRSNT: CPT | Performed by: INTERNAL MEDICINE

## 2024-04-25 PROCEDURE — 3074F SYST BP LT 130 MM HG: CPT | Performed by: INTERNAL MEDICINE

## 2024-04-25 PROCEDURE — 99214 OFFICE O/P EST MOD 30 MIN: CPT | Performed by: INTERNAL MEDICINE

## 2024-04-25 PROCEDURE — 36415 COLL VENOUS BLD VENIPUNCTURE: CPT

## 2024-04-25 PROCEDURE — 1159F MED LIST DOCD IN RCRD: CPT | Performed by: INTERNAL MEDICINE

## 2024-04-25 PROCEDURE — 3078F DIAST BP <80 MM HG: CPT | Performed by: INTERNAL MEDICINE

## 2024-04-25 PROCEDURE — 99215 OFFICE O/P EST HI 40 MIN: CPT | Performed by: INTERNAL MEDICINE

## 2024-04-25 PROCEDURE — 84075 ASSAY ALKALINE PHOSPHATASE: CPT | Performed by: INTERNAL MEDICINE

## 2024-04-25 PROCEDURE — 1036F TOBACCO NON-USER: CPT | Performed by: INTERNAL MEDICINE

## 2024-04-25 RX ORDER — ROSUVASTATIN CALCIUM 10 MG/1
10 TABLET, COATED ORAL DAILY
Qty: 30 TABLET | Refills: 11 | Status: SHIPPED | OUTPATIENT
Start: 2024-04-25 | End: 2024-04-26 | Stop reason: DRUGHIGH

## 2024-04-25 ASSESSMENT — PAIN SCALES - GENERAL: PAINLEVEL: 0-NO PAIN

## 2024-04-25 NOTE — PROGRESS NOTES
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        a.  Initial dx in June 2016.  S/p right middle lobe lobectomy. stage I (pT1bN0 ) (PDL-1 ~10%).         b.  New RUL lesion in 2020.  S/p local radiation therapy in April 2020.          c.  New RUL/RLL lesions noted on routine screening scans in December 2023         d.  S/p RUL lobectomy and RLL wedge resection in 01/2024 (KRAS G12C mutation/PDL-1 ~90%)        e.  Adjuvant therapy with platinum/pemetrexed x 4 cycles f/by consolidative pembrolizumab              beginning Mar 2024.    CHIEF COMPLAINTS:  Patient is in the clinic today for follow-up of lung cancer and for continuation of treatment and management of therapy related effects.    HISTORY:  Tao Renee is a 73 y.o. male with history of recurrent lung cancer.  He was initially diagnosed with right lung cancer in June 2016 and is s/p right middle lobe lobectomy with postoperative pathology results suggestive of stage I (pT1bN0 ) well-differentiated adenocarcinoma.  In 2020 he developed a new RUL nodule that was biopsied and found to be a new lepidic well differentiated invasive adenocarcinoma, with non-classic EGFR mutation.   A PET scan obtained on 03/05/20 showed no evidence of extra-thoracic disease and he was treated with hypofractionated radiation from 4/21/2020 to 5/11/2020.  During all this time he was followed closely by Dr. Bronson in CT surgery clinic and was noted to have new and worsening nodules in RUL and RLL in December 2023 and underwent a RUL lobectomy and RLL superior segmentectomy on 01/22/24.  Three different lesions were resected. While the genomic profile of these tumors differed from prior in 2020, indicating distinct neoplasms, two of the nodules, one on the RUL and the RLL nodule shared a  [id number] mutation, which is compatible with mpT4 N0 disease.  One of the lesion had had a PD-L1 expression of 90%, others were negative.  He was evaluated by Dr. Arguelles in thoracic oncology clinic and was adjuvant chemotherapy with carboplatin/Alimta x 4 cycles followed by 1 year of adjuvant immunotherapy.  He is getting the treatment here at [facility] beginning 2024.    INTERVAL HISTORY:  Tolerated the first cycle of chemotherapy relatively well.  Had issues with weakness and fatigue for few days but no nausea or diarrhea etc.  Recovering well from the surgery.  Still has some minimal chest discomfort and occasional cough.  No history of nausea, vomiting, fever, night sweats, diarrhea, rash, anorexia or weight loss. No recent changes in medications.    PAST MEDICAL HISTORY:  1.  Recurrent lung cancer as detailed above.  2.  Coronary artery disease  3.  Hyperlipidemia  4.  Anxiety  5.  Thoracic aortic aneurysm  6.  BPH  7.  GERD  8.  Sleep apnea  9.  History of bowel resection for diverticular disease in  and colonic polypectomy.    SOCIAL HISTORY:  He is single and lives alone in [place].  Quit smoking in .  1 to 2 pack a day for 30 years prior smoking history.  Admit to social alcohol intake.  As per patient he was a heavy drinker but has cut down considerably.  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  Born and raised in [place].    FAMILY HISTORY:  Father  at age 92 from C. difficile related complication.  Mother  at age 89 from lower extremity vascular issues.  3 siblings all alive and well.  He does not have any children.  No other family history of bleeding, clotting or malignant disorders in the family history.    REVIEW OF SYSTEMS:   Pertinent finding as per the history above.  All other systems have been reviewed and generally negative and noncontributory.    VITAL  SIGNS  BSA: There is no height or weight on file to calculate BSA.  There were no vitals taken for this visit.    PHYSICAL EXAMINATION:  Detailed physical examination not done.    LAB DATA:  CBC and metabolic profile from today is unremarkable.    RADIOLOGICAL DATA:  MRI brain was unremarkable on 3/6/2024.  CT chest results from December 2023 were reviewed and have been detailed in the history above.    PATHOLOGY DATA:  Surgical Pathology Exam: [id number]   A. LYMPH NODE, LEVEL 9; BIOPSY: -- One lymph node, negative for metastasis (0/1)  B. LYMPH NODE, LEVEL 7; BIOPSY:  -- One lymph node, negative for metastasis (0/1)  C. LYMPH NODE, LEVEL 11RS; BIOPSY:  -- One lymph node, negative for metastasis (0/1)  D. LYMPH NODE, LEVEL 10; BIOPSY:  -- One lymph node, negative for metastasis (0/1)  E. LYMPH NODE, LEVEL 11RI; BIOPSY:  -- One lymph node, negative for metastasis (0/1)  F. LYMPH NODE, LEVEL 12; BIOPSY:   -- One lymph node, negative for metastasis (0/1)  G. LUNG, RIGHT LOWER LOBE; SUPERIOR SEGMENTECTOMY:   -- Adenocarcinoma, 90% acinar and 10% lepidic patterns.  -- Tumor size: 2.7 cm in greatest dimension  -- No lymphovascular or pleural invasion is identified  -- Margin is free of tumor (but less than 1 mm far from the tumor)     H. LUNG, RIGHT UPPER LOBE; LOBECTOMY:   Three separate foci of adenocarcinomas:  Tumor#1              -- Adenocarcinoma, acinar 60% and lepidic 40% patterns              -- Tumor size: 1.0 cm in greatest dimension              -- No lymphovascular or pleural invasion identified  Tumor#2              -- Adenocarcinoma, papillary 60%, acinar 30%, micropapillary 5%, and lepidic 5% patterns              -- Tumor size:1.4 cm in greatest dimension              -- No lymphovascular or pleural invasion identified  Tumor#3              -- Adenocarcinoma, acinar 50%, papillary %30 and lepidic 20% patterns              -- Tumor size: 1.2 cm in greatest dimension              -- No lymphovascular or  pleural invasion identified   -- Resection margins are free of tumor  -- Twelve lymph nodes, negative for metastasis (0/13)     I. LYMPH NODE, LEVEL 4R; BIOPSY:   -- One lymph node, negative for metastasis (0/1)  PD-L1 expression  Block used:  H4 Interpretation: Negative  Tumor Proportion Score (TPS): <1%  Block used:  H5 Interpretation: Negative Tumor Proportion Score (TPS): <1%  Block used:  H7 Interpretation: Positive Tumor Proportion Score (TPS): 90%  Block used:  G5 Interpretation: Negative Tumor Proportion Score (TPS): <1%    Specimen: FFPE, [id number]   DISEASE ASSOCIATED GENOMIC FINDINGS:   KRAS p.G12C (NM_033360 c.34G>T)  Note: The current specimen [id number], 'LUNG, RIGHT UPPER LOBE, Nodule #3' shares the same KRAS G12C mutation as the previous specimen, [id number] G5, 'LUNG, RIGHT LOWER LOBE' collected on 01/22/2024.     Specimen: FFPE, [id number]  Estimated Tumor Content: 40%  MICROSATELLITE STATUS: Microsatellite Instability-High (MSI-H) is NOT DETECTED.  DISEASE ASSOCIATED GENOMIC FINDINGS: Not Detected.    Specimen: FFPE, [id number]  Estimated Tumor Content: 30%  MICROSATELLITE STATUS: Microsatellite Instability-High (MSI-H) is NOT DETECTED.  DISEASE ASSOCIATED GENOMIC FINDINGS:   KRAS p.G12C (NM_033360 c.34G>T)  Note: The genomic findings in the current specimen 'LUNG, RIGHT UPPER LOBE' are different than those detected in previously tested specimen, [id number] ([id number]), from the 'RIGHT LUNG' collected on 02/27/2020. If consistent with other clinicopathological findings, the dissimilarity in genomic findings supports that the two lesions are different in origin.    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  Please refer to the details  of initial presentation and management as outlined above.  In summary, was initially diagnosed with right lung cancer in June 2016 and is s/p right middle lobe lobectomy with postoperative pathology results  suggestive of stage I (pT1bN0 ) well-differentiated adenocarcinoma.  In 2020 he developed a new RUL nodule that was biopsied and found to be a new lepidic well differentiated invasive adenocarcinoma, with non-classic EGFR mutation.   A PET scan obtained on 03/05/20 showed no evidence of extra-thoracic disease and he was treated with hypofractionated radiation from 4/21/2020 to 5/11/2020.  During all this time he was followed closely by Dr. Bronson in CT surgery clinic and was noted to have new and worsening nodules in RUL and RLL in December 2023 and underwent a RUL lobectomy and RLL superior segmentectomy on 01/22/24.  Three different lesions were resected. While the genomic profile of these tumors differed from prior in 2020, indicating distinct neoplasms, two of the nodules, one on the RUL and the RLL nodule shared a EPNIC48D mutation, which is compatible with mpT4 N0 disease.  One of the lesion had had a PD-L1 expression of 90%, others were negative.  He was evaluated by Dr. Arguelles in thoracic oncology clinic and has been advised adjuvant chemotherapy with carboplatin/Alimta x 4 cycles followed by 1 year of adjuvant immunotherapy.   After initial reluctance he agreed for chemotherapy with carboplatin/Alimta combination beginning April 2024.  He is very apprehensive about trial of Keytruda and probably will not agree for that.      For now he will continue with the chemotherapy at the current dose and schedule.  So far tolerating it well.  Probable side effects of myelosuppression, weakness, fatigue, nephrotoxicity's etc. were explained to him in detail.  We will recheck the scans after 4 cycles and thereafter will review the option of maintenance immunotherapy with pembrolizumab, although as stated above he seems to be very apprehensive about trial of immunotherapy.      2.  Follow-up.  He will return to clinic in 3 weeks.    This note has been transcribed using Dragon voice recognition system and there is a  possibility of unintentional typing misprints.

## 2024-04-25 NOTE — PROGRESS NOTES
Referred by No ref. provider found    HPI I am seeing Jeffrey in the office for the first time in 4 years.  In January he had to have preoperative clearance seen by Dr. Beasley.  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  He underwent a right upper lobectomy.  No cardiac issues with it.  He had some vague left-sided chest tightness and pain.  He is not sure if it is related to the chemotherapy that is receiving, reflux, or if it is musculoskeletal.    Jamie will be over identified in almost    Past Medical History:  Problem List Items Addressed This Visit    None       Past Medical History:   Diagnosis Date    Arteriosclerosis of coronary artery 09/06/2023    Elevated calcium score 18 Agatston units.(25th percentile)    Arthritis     BPH (benign prostatic hyperplasia)     GERD (gastroesophageal reflux disease)     H/O echocardiogram     Last Echo in 2018    HL (hearing loss)     Hyperlipidemia 09/06/2023    Dr. Chou follows    Lung cancer (Multi)     recurrent lung cancer    Shortness of breath     Sleep apnea     Spondyloarthropathy 09/06/2023    Strain of lumbar region 02/02/2024    Thoracic aortic aneurysm (TAA) ([facility]-formerly [facility]) 09/06/2023    3.8 - 4 cm             Past Surgical History:  He has a past surgical history that includes Colonoscopy (10/14/2015); Small intestine surgery (10/14/2015); Other surgical history (06/22/2016); CT guided percutaneous biopsy lung (05/04/2016); CT guided percutaneous biopsy lung (10/23/2019); CT angio abdomen pelvis w and or wo IV IV contrast (01/05/2020); CT guided percutaneous biopsy lung (02/27/2020); Lung lobectomy (Right); and Bronchoscopy.      Social History:  He reports that he quit smoking about 23 years ago. His smoking use included cigarettes. He started smoking about 57 years ago. He has a 68 pack-year smoking history. He has never used smokeless tobacco. He reports current alcohol use of about 6.0 standard drinks of alcohol per week. He reports  that he does not use drugs.    Family History:  Family History   Problem Relation Name Age of Onset    Hypertension Mother      Coronary artery disease Mother      Hypertension Father      Coronary artery disease Father      Heart attack Father          Allergies:  Atorvastatin and Azithromycin    Outpatient Medications:  Current Outpatient Medications   Medication Instructions    acetaminophen (TYLENOL) 650 mg, oral, Every 6 hours    ALPRAZolam (XANAX) 0.5 mg, oral, Nightly PRN    cyanocobalamin (VITAMIN B-12) 1,000 mcg, oral, Daily    fluticasone (Flonase Allergy Relief) 50 mcg/actuation nasal spray 1 spray, Each Nostril, Daily PRN    lisinopril 2.5 mg, oral, Daily    NON FORMULARY Organic bitter apricot kernels 3-4 a day     ondansetron (ZOFRAN) 8 mg, oral, Every 8 hours PRN    oxyCODONE (ROXICODONE) 5 mg, oral, Every 6 hours PRN    pantoprazole (PROTONIX) 20 mg, oral, Daily before breakfast, Do not crush, chew, or split.    prochlorperazine (COMPAZINE) 10 mg, oral, Every 6 hours PRN    pyridoxine HCl, vitamin B6, (VITAMIN B-6 ORAL) 100 mg, oral    sildenafil (VIAGRA) 25 mg, oral, 1 hour before needed    thiamine 100 mg tablet oral    topiramate (TOPAMAX) 12.5 mg, oral, Daily        Last Recorded Vitals:  There were no vitals filed for this visit.    Physical Exam    Physical  Patient is alert and oriented x3.  HEENT is unremarkable mucous members are moist  Neck no JVP no bruits upstrokes are full no thyromegaly  Lungs are clear bilaterally.  No wheezing crackles or rales  Heart regular rhythm normal S1-S2 there is no S3 no murmurs are heard.  Abdomen is soft vessels are positive nontender nondistended no organomegaly no pulsatile masses  Extremities have no edema.  Distal pulses present palpable.  Neuro is grossly nonfocal  Skin has no rashes     Last Labs:  CBC -  Lab Results   Component Value Date    WBC 10.0 04/03/2024    HGB 15.5 04/03/2024    HCT 47.9 04/03/2024    MCV 87 04/03/2024     04/03/2024        CMP -  Lab Results   Component Value Date    CALCIUM 9.0 04/03/2024    PHOS 3.2 01/28/2024    PROT 6.6 04/03/2024    ALBUMIN 3.7 04/03/2024    AST 7 (L) 04/03/2024    ALT 11 04/03/2024    ALKPHOS 65 04/03/2024    BILITOT 0.7 04/03/2024       LIPID PANEL -   Lab Results   Component Value Date    CHOL 196 11/29/2023    HDL 50.3 11/29/2023    CHHDL 3.9 11/29/2023    VLDL 18 11/29/2023    TRIG 88 11/29/2023    NHDL 146 11/29/2023       RENAL FUNCTION PANEL -   Lab Results   Component Value Date    K 4.8 04/03/2024    PHOS 3.2 01/28/2024       Lab Results   Component Value Date    HGBA1C 5.6 01/09/2024     Procedure    Echo 1/16/2024 EF 60 to 65%, DDF, thoracic aorta 4.5 cm    CT [11/2/2020]; Appearance of chest is similar to prior exam. Marginal increase in R suprahilar density / bronchiectasis. Although prob fibrotic, underlying neoplastic cause cannot be excluded.     PET LUNG [3/5/2020]: Hypermetabolic RUL pulm nodule c/w known malignancy recurrence. No ev/of hypermetabolic local or distant mets.     PFT [3/16/2020]: Mild COPD w/mildly reduced DLCO      EX NST [12/3/2019]: 6 min 17 sec (7.40 METs) . . . Normal - ev/for diaphragmatic attenuation artifact. Normal segmental function on gated images with exercise EF 80%.      CT CHEST w/o contrast [10/16/19]: A 1.1 x 1.3 cm mixed ground-glass and solid RUL nodular opacity which appears stable in size but demonstrates an increased solid component compared to prior exam. Percutaneous bx recommended. Multiple addl bilat nodular opacities, stable. S/P right middle lobectomy w/o ev/of local tumor recurrence. Bilat upper lobe predominant emphysematous changes. Mod athero calcifications of thoracic aorta and coronary arteries. Multiple hepatic cysts, stable.      ECHO [11/21//2018]: LVSF normal, EF 60-65%, SD = impaired relax.     CT CHEST (7/18/2018) Interval stability of pulm nodules and area of R-upper lung ground glass attenuation. Mild centrilobular emphysema.      PVR (3/27/2018) Mild femoral popliteal occlusive disease, bi-lat lower extremities.      Chest CTA Triple R/O Â [04/07/2016]: Approx 1.8cm irreg nodular density in RML slightly inc from 1.4cm previously.     Carotid Duplex Â [07/06/2015]: 16-49% stenosis NAVEEN/LICA, nrml flow.     Calcium Score Â [02/19/2010]: Score of 0 Agatston units, multipile low-density lesions in liver otherwise negative study.          Assessment/Plan   1. CAD. Mildly elevated calcium score 15 Agatston units placing him in the 25th percentile.His stress test in 11/2019 was NL.  I have asked for him to restart his baby aspirin.  He is off his simvastatin.  I am asking him to try rosuvastatin 5 mg.  He has been very reluctant to try statins.     2. Hyperlipidemia.  Off statins.  11/29/2023  HDL 50 triglycerides 88.  He was intolerant to simvastatin.  With some urging he is willing to try rosuvastatin 5 mg.  Lipids and CMP 3 months    3. Thoracic aortic aneurysm measuring 3.85 cm.  Echo 1/20/2024 TAA 4.5 cm a formal CTA chest will be done sometime in July.  Echo at the same time to see if there is correlation to measurement of his aorta    4. Right middle lobe lung cancer status post lobectomy..  This was in 2018.  Had a recurrence of adenocarcinoma and underwent a resection of the right upper lobe January 2024.  Getting adjuvant chemotherapy at this time    5. Hypertension.  Blood pressures are well-controlled    6.  Chemotherapy.  An oncologic echo will be done in July.  We also correlate the size of his aorta.    CTA chest and oncologic echo July 2024.  Fasting blood work towards the end of July 2024.  Return to see me August 2024    Rossi Chou MD     Instructions and follow up

## 2024-04-25 NOTE — PATIENT INSTRUCTIONS
1. CAD. Mildly elevated calcium score 15 Agatston units placing him in the 25th percentile.His stress test in 11/2019 was NL.  I have asked for him to restart his baby aspirin.  He is off his simvastatin.  I am asking him to try rosuvastatin 5 mg.  He has been very reluctant to try statins.     2. Hyperlipidemia.  Off statins.  11/29/2023  HDL 50 triglycerides 88.  He was intolerant to simvastatin.  With some urging he is willing to try rosuvastatin 5 mg.  Lipids and CMP 3 months    3. Thoracic aortic aneurysm measuring 3.85 cm.  Echo 1/20/2024 TAA 4.5 cm a formal CTA chest will be done sometime in July.  Echo at the same time to see if there is correlation to measurement of his aorta    4. Right middle lobe lung cancer status post lobectomy..  This was in 2018.  Had a recurrence of adenocarcinoma and underwent a resection of the right upper lobe January 2024.  Getting adjuvant chemotherapy at this time    5. Hypertension.  Blood pressures are well-controlled    6.  Chemotherapy.  An oncologic echo will be done in July.  We also correlate the size of his aorta.    CTA chest and oncologic echo July 2024.  Fasting blood work towards the end of July 2024.  Return to see me August 2024

## 2024-04-26 ENCOUNTER — TELEPHONE (OUTPATIENT)
Dept: CARDIOLOGY | Facility: CLINIC | Age: 74
End: 2024-04-26

## 2024-04-26 ENCOUNTER — INFUSION (OUTPATIENT)
Dept: HEMATOLOGY/ONCOLOGY | Facility: CLINIC | Age: 74
End: 2024-04-26
Payer: MEDICARE

## 2024-04-26 ENCOUNTER — SOCIAL WORK (OUTPATIENT)
Dept: HEMATOLOGY/ONCOLOGY | Facility: CLINIC | Age: 74
End: 2024-04-26
Payer: MEDICARE

## 2024-04-26 VITALS
SYSTOLIC BLOOD PRESSURE: 126 MMHG | HEIGHT: 74 IN | DIASTOLIC BLOOD PRESSURE: 55 MMHG | BODY MASS INDEX: 30.5 KG/M2 | TEMPERATURE: 97.3 F | OXYGEN SATURATION: 98 % | HEART RATE: 68 BPM | RESPIRATION RATE: 18 BRPM | WEIGHT: 237.66 LBS

## 2024-04-26 DIAGNOSIS — E78.2 MIXED HYPERLIPIDEMIA: ICD-10-CM

## 2024-04-26 DIAGNOSIS — C34.91 ADENOCARCINOMA OF RIGHT LUNG (MULTI): Primary | Chronic | ICD-10-CM

## 2024-04-26 DIAGNOSIS — C34.91 ADENOCARCINOMA OF RIGHT LUNG (MULTI): Primary | ICD-10-CM

## 2024-04-26 PROCEDURE — 96367 TX/PROPH/DG ADDL SEQ IV INF: CPT

## 2024-04-26 PROCEDURE — 96375 TX/PRO/DX INJ NEW DRUG ADDON: CPT | Mod: INF

## 2024-04-26 PROCEDURE — 96411 CHEMO IV PUSH ADDL DRUG: CPT

## 2024-04-26 PROCEDURE — 2500000004 HC RX 250 GENERAL PHARMACY W/ HCPCS (ALT 636 FOR OP/ED): Mod: JW | Performed by: INTERNAL MEDICINE

## 2024-04-26 PROCEDURE — 96413 CHEMO IV INFUSION 1 HR: CPT

## 2024-04-26 RX ORDER — HEPARIN 100 UNIT/ML
500 SYRINGE INTRAVENOUS AS NEEDED
Status: CANCELLED | OUTPATIENT
Start: 2024-04-26

## 2024-04-26 RX ORDER — HEPARIN SODIUM,PORCINE/PF 10 UNIT/ML
50 SYRINGE (ML) INTRAVENOUS AS NEEDED
Status: CANCELLED | OUTPATIENT
Start: 2024-04-26

## 2024-04-26 RX ORDER — ROSUVASTATIN CALCIUM 5 MG/1
5 TABLET, COATED ORAL DAILY
COMMUNITY
End: 2024-05-16 | Stop reason: SDUPTHER

## 2024-04-26 RX ORDER — PALONOSETRON 0.05 MG/ML
0.25 INJECTION, SOLUTION INTRAVENOUS ONCE
Status: COMPLETED | OUTPATIENT
Start: 2024-04-26 | End: 2024-04-26

## 2024-04-26 RX ADMIN — SODIUM CHLORIDE 150 MG: 9 INJECTION, SOLUTION INTRAVENOUS at 10:11

## 2024-04-26 RX ADMIN — DEXAMETHASONE SODIUM PHOSPHATE 12 MG: 10 INJECTION, SOLUTION INTRAMUSCULAR; INTRAVENOUS at 10:49

## 2024-04-26 RX ADMIN — PALONOSETRON 250 MCG: 0.05 INJECTION, SOLUTION INTRAVENOUS at 10:08

## 2024-04-26 RX ADMIN — CARBOPLATIN 557 MG: 10 INJECTION, SOLUTION INTRAVENOUS at 11:32

## 2024-04-26 RX ADMIN — PEMETREXED DISODIUM 1200 MG: 500 INJECTION, POWDER, LYOPHILIZED, FOR SOLUTION INTRAVENOUS at 11:09

## 2024-04-26 ASSESSMENT — PAIN SCALES - GENERAL: PAINLEVEL: 3

## 2024-04-26 NOTE — PROGRESS NOTES
1042 IV Emend infused over 30 min, NS flushing IV line.  1215 VSS, pt aria chemo trt today, pt disch amb by himself, being picked up by his brother.

## 2024-04-26 NOTE — TELEPHONE ENCOUNTER
"Per OV note 4/25/24 \"Hyperlipidemia.  Off statins.  11/29/2023  HDL 50 triglycerides 88.  He was intolerant to simvastatin.  With some urging he is willing to try rosuvastatin 5 mg.  Lipids and CMP 3 months\"    Will resend rx for 5mg. Patient advised to cut 10mg pill in half for now. Patient verb understanding.  "

## 2024-04-26 NOTE — PROGRESS NOTES
ONCOLOGY/HEMATOLOGY PSYCHOSOCIAL ASSESSMENT     Demographic Information  Tao Renee  1950  [id number]  Assessment Type:    Date of assessment: 04/26/24  Person(s) present during assessment: Pt  Did patient participate in assessment: yes  If no, why not:  Primary language: English  Interpretive services used: nav  Medical Oncologist: Benjamin  Diagnosis: Adenocarcinoma of R lung                Marital Status / Family / Household  Status:  single  Family composition: lives home alone has two brothers and a sister who are local  Support systems: Siblings and friends  Primary caregiver:  self  Current employment status:   retired  Work history/type of work:   Worked at [employer] until 2001 and then worked at [employer] for 18 yrs but retired due to dx.  Comments:     Environmental Supports  Current living situation:   house  Patient's home environment: lives in one level home but has to go to basement for laundry     Functional Status   Functional status:  Independent   What supports are in place to assist the patient: has a tub bench and bars for safety  What community resources have been offered: TG and the [facility] retreat  Transportation:  Siblings driving pt  Psychosocial risk factors impacting the patient:   Adjustment to dx and treatment     Assistive Devices  Patient has the following equipment: bath tub bench   Patient currently ambulates:  independently with no devices     Finances/Insurance  Primary insurance: Medicare A & B  Secondary insurance: AARP  Patient's current income source:  halfway  Does the patient have any financial concerns:  none mentioned during visit    Comments:      Advance Directives  None on file    Mental Health  Mental health history and status details:  none reported or on file    How does the patient describe their current health status: Pt is AxOx3 and is clear about dx and treatment plan  What does the patient do for enjoyment: Pt was at the [employer] and  also goes to car shows and meets with friends    Depression Screen        Social Determinants of Health     Tobacco Use: Medium Risk (2024)    Patient History     Smoking Tobacco Use: Former     Smokeless Tobacco Use: Never     Passive Exposure: Not on file   Alcohol Use: Not At Risk (2024)    AUDIT-C     Frequency of Alcohol Consumption: Never     Average Number of Drinks: Patient does not drink     Frequency of Binge Drinking: Never   Financial Resource Strain: Low Risk  (2024)    Overall Financial Resource Strain (CARDIA)     Difficulty of Paying Living Expenses: Not hard at all   Food Insecurity: No Food Insecurity (10/11/2023)    Hunger Vital Sign     Worried About Running Out of Food in the Last Year: Never true     Ran Out of Food in the Last Year: Never true   Transportation Needs: No Transportation Needs (2024)    PRAPARE - Transportation     Lack of Transportation (Medical): No     Lack of Transportation (Non-Medical): No   Physical Activity: Not on file   Stress: Not on file   Social Connections: Not on file   Intimate Partner Violence: Not on file   Depression: Not at risk (2024)    PHQ-2     PHQ-2 Score: 0   Housing Stability: Low Risk  (2024)    Housing Stability Vital Sign     Unable to Pay for Housing in the Last Year: No     Number of Places Lived in the Last Year: 1     Unstable Housing in the Last Year: No   Utilities: Not on file   Digital Equity: Not on file   Health Literacy: Not on file       Assessment/Plan    Pt is a 74 y/o male dx with adenocarcinoma of the R  middle lung. Pt had a resection on 24. Pt mentions on the R side only his lower lobe is left. Pt had the upper portion of R lung removed in 2018. At that time he did not have treatment only surgery per pt. Pt will be getting 4 scheduled chemo and then immunotherapy. Pt is against the immunotherapy after his brother in law  while on it. Pt seems to only believe in first hand experiences with  agreeing to what comes next. Provided support to pt letting him know he does not need to make those decisions now but after his 4 treatments are completed. Pt lives at home alone on one level and goes to basement to do laundry currently with no issues. Pt uses a tub bench while bathing to stay safe. Pt is independent with function and self care and drives. Pt just stopped cutting [employer] course recently due to his dx. He was working at [employer] for past 18 years. Introduced Onc KATHERINE supportive role and reviewed TGP and wellbeing retreat programming with him. Left card and let pt know Onc KATHERINE would be in to check on him throughout care and tx to ensure he is coping and doing well emotionally.

## 2024-04-26 NOTE — SIGNIFICANT EVENT

## 2024-04-26 NOTE — TELEPHONE ENCOUNTER
PT HAD APPT YESTERDAY WITH DR. BREEN--4/25/24. WHEN HE TALKED WITH THE DOCTOR HE SAID HE WAS PUTTING HIM ON A LOW DOSE--5MG OF ROSUVASTATIN , BUT WHEN HE PICKED IT UP FROM HIS PHARMACY IT WAS ROSUVASTATIN CALCIUM 10MG TAB 1X A DAY    HE THINKS THE 10 MG MAY BE A MISTAKE, PLEASE CALL HIM TO CONFIRM

## 2024-04-27 RX ORDER — ROSUVASTATIN CALCIUM 5 MG/1
5 TABLET, COATED ORAL DAILY
Qty: 30 TABLET | Refills: 11 | Status: SHIPPED | OUTPATIENT
Start: 2024-04-27 | End: 2025-04-27

## 2024-05-16 ENCOUNTER — OFFICE VISIT (OUTPATIENT)
Dept: HEMATOLOGY/ONCOLOGY | Facility: CLINIC | Age: 74
End: 2024-05-16
Payer: MEDICARE

## 2024-05-16 ENCOUNTER — LAB (OUTPATIENT)
Dept: LAB | Facility: CLINIC | Age: 74
End: 2024-05-16
Payer: MEDICARE

## 2024-05-16 VITALS
HEART RATE: 83 BPM | BODY MASS INDEX: 29.67 KG/M2 | TEMPERATURE: 97.3 F | OXYGEN SATURATION: 97 % | SYSTOLIC BLOOD PRESSURE: 138 MMHG | WEIGHT: 233.69 LBS | RESPIRATION RATE: 20 BRPM | DIASTOLIC BLOOD PRESSURE: 73 MMHG

## 2024-05-16 DIAGNOSIS — C34.91 ADENOCARCINOMA OF RIGHT LUNG (MULTI): Primary | Chronic | ICD-10-CM

## 2024-05-16 DIAGNOSIS — C34.91 ADENOCARCINOMA OF RIGHT LUNG (MULTI): ICD-10-CM

## 2024-05-16 LAB
ALBUMIN SERPL BCP-MCNC: 3.9 G/DL (ref 3.4–5)
ALP SERPL-CCNC: 66 U/L (ref 33–136)
ALT SERPL W P-5'-P-CCNC: 19 U/L (ref 10–52)
ANION GAP SERPL CALC-SCNC: 11 MMOL/L (ref 10–20)
AST SERPL W P-5'-P-CCNC: 9 U/L (ref 9–39)
BASOPHILS # BLD AUTO: 0.02 X10*3/UL (ref 0–0.1)
BASOPHILS NFR BLD AUTO: 0.3 %
BILIRUB SERPL-MCNC: 0.7 MG/DL (ref 0–1.2)
BUN SERPL-MCNC: 10 MG/DL (ref 6–23)
CALCIUM SERPL-MCNC: 9 MG/DL (ref 8.6–10.3)
CHLORIDE SERPL-SCNC: 105 MMOL/L (ref 98–107)
CO2 SERPL-SCNC: 23 MMOL/L (ref 21–32)
CREAT SERPL-MCNC: 1.06 MG/DL (ref 0.5–1.3)
EGFRCR SERPLBLD CKD-EPI 2021: 74 ML/MIN/1.73M*2
EOSINOPHIL # BLD AUTO: 0.06 X10*3/UL (ref 0–0.4)
EOSINOPHIL NFR BLD AUTO: 0.9 %
ERYTHROCYTE [DISTWIDTH] IN BLOOD BY AUTOMATED COUNT: 15.2 % (ref 11.5–14.5)
GLUCOSE SERPL-MCNC: 100 MG/DL (ref 74–99)
HCT VFR BLD AUTO: 44.6 % (ref 41–52)
HGB BLD-MCNC: 14.9 G/DL (ref 13.5–17.5)
IMM GRANULOCYTES # BLD AUTO: 0.04 X10*3/UL (ref 0–0.5)
IMM GRANULOCYTES NFR BLD AUTO: 0.6 % (ref 0–0.9)
LYMPHOCYTES # BLD AUTO: 1.25 X10*3/UL (ref 0.8–3)
LYMPHOCYTES NFR BLD AUTO: 19.8 %
MCH RBC QN AUTO: 29 PG (ref 26–34)
MCHC RBC AUTO-ENTMCNC: 33.4 G/DL (ref 32–36)
MCV RBC AUTO: 87 FL (ref 80–100)
MONOCYTES # BLD AUTO: 0.94 X10*3/UL (ref 0.05–0.8)
MONOCYTES NFR BLD AUTO: 14.9 %
NEUTROPHILS # BLD AUTO: 4.01 X10*3/UL (ref 1.6–5.5)
NEUTROPHILS NFR BLD AUTO: 63.5 %
NRBC BLD-RTO: 0 /100 WBCS (ref 0–0)
PLATELET # BLD AUTO: 218 X10*3/UL (ref 150–450)
POTASSIUM SERPL-SCNC: 4.4 MMOL/L (ref 3.5–5.3)
PROT SERPL-MCNC: 6.9 G/DL (ref 6.4–8.2)
RBC # BLD AUTO: 5.14 X10*6/UL (ref 4.5–5.9)
SODIUM SERPL-SCNC: 135 MMOL/L (ref 136–145)
WBC # BLD AUTO: 6.3 X10*3/UL (ref 4.4–11.3)

## 2024-05-16 PROCEDURE — 99214 OFFICE O/P EST MOD 30 MIN: CPT | Performed by: INTERNAL MEDICINE

## 2024-05-16 PROCEDURE — 1126F AMNT PAIN NOTED NONE PRSNT: CPT | Performed by: INTERNAL MEDICINE

## 2024-05-16 PROCEDURE — 80053 COMPREHEN METABOLIC PANEL: CPT | Performed by: INTERNAL MEDICINE

## 2024-05-16 PROCEDURE — 85025 COMPLETE CBC W/AUTO DIFF WBC: CPT | Performed by: INTERNAL MEDICINE

## 2024-05-16 PROCEDURE — 3075F SYST BP GE 130 - 139MM HG: CPT | Performed by: INTERNAL MEDICINE

## 2024-05-16 PROCEDURE — 1160F RVW MEDS BY RX/DR IN RCRD: CPT | Performed by: INTERNAL MEDICINE

## 2024-05-16 PROCEDURE — 3078F DIAST BP <80 MM HG: CPT | Performed by: INTERNAL MEDICINE

## 2024-05-16 PROCEDURE — 1157F ADVNC CARE PLAN IN RCRD: CPT | Performed by: INTERNAL MEDICINE

## 2024-05-16 PROCEDURE — 1159F MED LIST DOCD IN RCRD: CPT | Performed by: INTERNAL MEDICINE

## 2024-05-16 PROCEDURE — 36415 COLL VENOUS BLD VENIPUNCTURE: CPT

## 2024-05-16 RX ORDER — PROCHLORPERAZINE MALEATE 10 MG
10 TABLET ORAL EVERY 6 HOURS PRN
Status: CANCELLED | OUTPATIENT
Start: 2024-05-17

## 2024-05-16 RX ORDER — PALONOSETRON 0.05 MG/ML
0.25 INJECTION, SOLUTION INTRAVENOUS ONCE
OUTPATIENT
Start: 2024-06-07

## 2024-05-16 RX ORDER — CYANOCOBALAMIN 1000 UG/ML
1000 INJECTION, SOLUTION INTRAMUSCULAR; SUBCUTANEOUS ONCE
OUTPATIENT
Start: 2024-06-07

## 2024-05-16 RX ORDER — DIPHENHYDRAMINE HYDROCHLORIDE 50 MG/ML
50 INJECTION INTRAMUSCULAR; INTRAVENOUS AS NEEDED
OUTPATIENT
Start: 2024-06-07

## 2024-05-16 RX ORDER — ASPIRIN 81 MG/1
81 TABLET ORAL DAILY
COMMUNITY

## 2024-05-16 RX ORDER — PROCHLORPERAZINE EDISYLATE 5 MG/ML
10 INJECTION INTRAMUSCULAR; INTRAVENOUS EVERY 6 HOURS PRN
Status: CANCELLED | OUTPATIENT
Start: 2024-05-17

## 2024-05-16 RX ORDER — PROCHLORPERAZINE EDISYLATE 5 MG/ML
10 INJECTION INTRAMUSCULAR; INTRAVENOUS EVERY 6 HOURS PRN
OUTPATIENT
Start: 2024-06-07

## 2024-05-16 RX ORDER — PROCHLORPERAZINE MALEATE 10 MG
10 TABLET ORAL EVERY 6 HOURS PRN
OUTPATIENT
Start: 2024-06-07

## 2024-05-16 RX ORDER — ALBUTEROL SULFATE 0.83 MG/ML
3 SOLUTION RESPIRATORY (INHALATION) AS NEEDED
OUTPATIENT
Start: 2024-06-07

## 2024-05-16 RX ORDER — FAMOTIDINE 10 MG/ML
20 INJECTION INTRAVENOUS ONCE AS NEEDED
OUTPATIENT
Start: 2024-06-07

## 2024-05-16 RX ORDER — EPINEPHRINE 0.3 MG/.3ML
0.3 INJECTION SUBCUTANEOUS EVERY 5 MIN PRN
OUTPATIENT
Start: 2024-06-07

## 2024-05-16 ASSESSMENT — PAIN SCALES - GENERAL: PAINLEVEL: 0-NO PAIN

## 2024-05-16 NOTE — PROGRESS NOTES
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        a.  Initial dx in June 2016.  S/p right middle lobe lobectomy. stage I (pT1bN0 ) (PDL-1 ~10%).         b.  New RUL lesion in 2020.  S/p local radiation therapy in April 2020.          c.  New RUL/RLL lesions noted on routine screening scans in December 2023         d.  S/p RUL lobectomy & RLL wedge resection in 01/2024 (KRAS G12C mutation/PDL-1 90%)        e.  Adjuvant therapy with platinum/pemetrexed x 4 cycles f/by consolidative pembrolizumab              beginning Mar 2024.    CHIEF COMPLAINTS:  Patient is in the clinic today for follow-up of lung cancer and for continuation of treatment and management of therapy related effects.    HISTORY:  Tao Renee is a 73 y.o. male with history of recurrent lung cancer.  He was initially diagnosed with right lung cancer in June 2016 and is s/p right middle lobe lobectomy with postoperative pathology results suggestive of stage I (pT1bN0 ) well-differentiated adenocarcinoma.  In 2020 he developed a new RUL nodule that was biopsied and found to be a new lepidic well differentiated invasive adenocarcinoma, with non-classic EGFR mutation.   A PET scan obtained on 03/05/20 showed no evidence of extra-thoracic disease and he was treated with hypofractionated radiation from 4/21/2020 to 5/11/2020.  During all this time he was followed closely by Dr. Bronson in CT surgery clinic and was noted to have new and worsening nodules in RUL and RLL in December 2023 and underwent a RUL lobectomy and RLL superior segmentectomy on 01/22/24.  Three different lesions were resected. While the genomic profile of these tumors differed from prior in 2020, indicating distinct neoplasms, two of the nodules, one on the RUL and the RLL nodule shared a  [id number] mutation, which is compatible with mpT4 N0 disease.  One of the lesion had had a PD-L1 expression of 90%, others were negative.  He was evaluated by Dr. Arguelles in thoracic oncology clinic and was adjuvant chemotherapy with carboplatin/Alimta x 4 cycles followed by 1 year of adjuvant immunotherapy.  He is getting the treatment here at [facility] beginning 2024.    INTERVAL HISTORY:  Is tolerating her treatment relatively well.  Mild weakness and fatigue as expected.  He also had few episode of mild nosebleed few days ago.  Still has some minimal chest discomfort and occasional cough.  No history of nausea, vomiting, fever, night sweats, diarrhea, rash, anorexia or weight loss. No recent changes in medications.    PAST MEDICAL HISTORY:  1.  Recurrent lung cancer as detailed above.  2.  Coronary artery disease  3.  Hyperlipidemia  4.  Anxiety  5.  Thoracic aortic aneurysm  6.  BPH  7.  GERD  8.  Sleep apnea  9.  History of bowel resection for diverticular disease in  and colonic polypectomy.    SOCIAL HISTORY:  He is single and lives alone in [place].  Quit smoking in .  1 to 2 pack a day for 30 years prior smoking history.  Admit to social alcohol intake.  As per patient he was a heavy drinker but has cut down considerably.  He is retired and used to work in steel mill.  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  Born and raised in [place].    FAMILY HISTORY:  Father  at age 92 from C. difficile related complication.  Mother  at age 89 from lower extremity vascular issues.  3 siblings all alive and well.  He does not have any children.  No other family history of bleeding, clotting or malignant disorders in the family history.    REVIEW OF SYSTEMS:   Pertinent finding as per the history above.  All other systems have been reviewed and generally negative and noncontributory.    VITAL SIGNS  BSA: 2.36 meters squared  /73    Pulse 83   Temp 36.3 °C (97.3 °F) (Temporal)   Resp 20   Wt 106 kg (233 lb 11 oz)   SpO2 97%   BMI 29.67 kg/m²     PHYSICAL EXAMINATION:  Detailed physical examination not done.    LAB DATA:  CBC and metabolic profile from today is unremarkable.    RADIOLOGICAL DATA:  MRI brain was unremarkable on 3/6/2024.  CT chest results from December 2023 were reviewed and have been detailed in the history above.    PATHOLOGY DATA:  Surgical Pathology Exam: [id number]   A. LYMPH NODE, LEVEL 9; BIOPSY: -- One lymph node, negative for metastasis (0/1)  B. LYMPH NODE, LEVEL 7; BIOPSY:  -- One lymph node, negative for metastasis (0/1)  C. LYMPH NODE, LEVEL 11RS; BIOPSY:  -- One lymph node, negative for metastasis (0/1)  D. LYMPH NODE, LEVEL 10; BIOPSY:  -- One lymph node, negative for metastasis (0/1)  E. LYMPH NODE, LEVEL 11RI; BIOPSY:  -- One lymph node, negative for metastasis (0/1)  F. LYMPH NODE, LEVEL 12; BIOPSY:   -- One lymph node, negative for metastasis (0/1)  G. LUNG, RIGHT LOWER LOBE; SUPERIOR SEGMENTECTOMY:   -- Adenocarcinoma, 90% acinar and 10% lepidic patterns.  -- Tumor size: 2.7 cm in greatest dimension  -- No lymphovascular or pleural invasion is identified  -- Margin is free of tumor (but less than 1 mm far from the tumor)     H. LUNG, RIGHT UPPER LOBE; LOBECTOMY:   Three separate foci of adenocarcinomas:  Tumor#1              -- Adenocarcinoma, acinar 60% and lepidic 40% patterns              -- Tumor size: 1.0 cm in greatest dimension              -- No lymphovascular or pleural invasion identified  Tumor#2              -- Adenocarcinoma, papillary 60%, acinar 30%, micropapillary 5%, and lepidic 5% patterns              -- Tumor size:1.4 cm in greatest dimension              -- No lymphovascular or pleural invasion identified  Tumor#3              -- Adenocarcinoma, acinar 50%, papillary %30 and lepidic 20% patterns              -- Tumor size: 1.2 cm in greatest dimension              -- No  lymphovascular or pleural invasion identified   -- Resection margins are free of tumor  -- Twelve lymph nodes, negative for metastasis (0/13)     I. LYMPH NODE, LEVEL 4R; BIOPSY:   -- One lymph node, negative for metastasis (0/1)  PD-L1 expression  Block used:  H4 Interpretation: Negative  Tumor Proportion Score (TPS): <1%  Block used:  H5 Interpretation: Negative Tumor Proportion Score (TPS): <1%  Block used:  H7 Interpretation: Positive Tumor Proportion Score (TPS): 90%  Block used:  G5 Interpretation: Negative Tumor Proportion Score (TPS): <1%    Specimen: FFPE, [id number] H7   DISEASE ASSOCIATED GENOMIC FINDINGS:   KRAS p.G12C (NM_033360 c.34G>T)  Note: The current specimen [id number], 'LUNG, RIGHT UPPER LOBE, Nodule #3' shares the same KRAS G12C mutation as the previous specimen, [id number] G5, 'LUNG, RIGHT LOWER LOBE' collected on 01/22/2024.     Specimen: FFPE, [id number] H5  Estimated Tumor Content: 40%  MICROSATELLITE STATUS: Microsatellite Instability-High (MSI-H) is NOT DETECTED.  DISEASE ASSOCIATED GENOMIC FINDINGS: Not Detected.    Specimen: FFPE, [id number] G5  Estimated Tumor Content: 30%  MICROSATELLITE STATUS: Microsatellite Instability-High (MSI-H) is NOT DETECTED.  DISEASE ASSOCIATED GENOMIC FINDINGS:   KRAS p.G12C ([id number] c.34G>T)  Note: The genomic findings in the current specimen 'LUNG, RIGHT UPPER LOBE' are different than those detected in previously tested specimen, [id number] ([id number]), from the 'RIGHT LUNG' collected on 02/27/2020. If consistent with other clinicopathological findings, the dissimilarity in genomic findings supports that the two lesions are different in origin.    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  Please refer to the   details of initial presentation and management as outlined above.  In summary, was initially diagnosed with right lung cancer in June 2016 and is s/p right middle lobe lobectomy with postoperative  pathology results suggestive of stage I (pT1bN0 ) well-differentiated adenocarcinoma.  In 2020 he developed a new RUL nodule that was biopsied and found to be a new lepidic well differentiated invasive adenocarcinoma, with non-classic EGFR mutation.   A PET scan obtained on 03/05/20 showed no evidence of extra-thoracic disease and he was treated with hypofractionated radiation from 4/21/2020 to 5/11/2020.  During all this time he was followed closely by Dr. Bronson in CT surgery clinic and was noted to have new and worsening nodules in RUL and RLL in December 2023 and underwent a RUL lobectomy and RLL superior segmentectomy on 01/22/24.  Three different lesions were resected. While the genomic profile of these tumors differed from prior in 2020, indicating distinct neoplasms, two of the nodules, one on the RUL and the RLL nodule shared a [id number] mutation, which is compatible with mpT4 N0 disease.  One of the lesion had had a PD-L1 expression of 90%, others were negative.  He was evaluated by Dr. Arguelles in thoracic oncology clinic and has been advised adjuvant chemotherapy with carboplatin/Alimta x 4 cycles followed by 1 year of adjuvant immunotherapy.   After initial reluctance he agreed for chemotherapy with carboplatin/Alimta combination beginning April 2024.  He is very apprehensive about trial of Keytruda and probably will not agree for that.      For now he will continue with the chemotherapy at the current dose and schedule.  So far tolerating it well.  Probable side effects of myelosuppression, weakness, fatigue, nephrotoxicity's etc. were explained to him in detail.  We will recheck the scans after 4 cycles and thereafter will review the option of maintenance immunotherapy with pembrolizumab, although as stated above he seems to be very apprehensive about trial of immunotherapy.      2.  Follow-up.  He will return to clinic in 3 weeks.    This note has been transcribed using Dragon voice recognition system and  there is a possibility of unintentional typing misprints.

## 2024-05-17 ENCOUNTER — INFUSION (OUTPATIENT)
Dept: HEMATOLOGY/ONCOLOGY | Facility: CLINIC | Age: 74
End: 2024-05-17
Payer: MEDICARE

## 2024-05-17 VITALS
WEIGHT: 233.25 LBS | HEIGHT: 74 IN | TEMPERATURE: 97 F | RESPIRATION RATE: 18 BRPM | SYSTOLIC BLOOD PRESSURE: 142 MMHG | OXYGEN SATURATION: 97 % | BODY MASS INDEX: 29.93 KG/M2 | DIASTOLIC BLOOD PRESSURE: 63 MMHG | HEART RATE: 69 BPM

## 2024-05-17 DIAGNOSIS — C34.91 ADENOCARCINOMA OF RIGHT LUNG (MULTI): Primary | ICD-10-CM

## 2024-05-17 PROCEDURE — 96372 THER/PROPH/DIAG INJ SC/IM: CPT

## 2024-05-17 PROCEDURE — 96375 TX/PRO/DX INJ NEW DRUG ADDON: CPT | Mod: INF

## 2024-05-17 PROCEDURE — 96411 CHEMO IV PUSH ADDL DRUG: CPT

## 2024-05-17 PROCEDURE — 2500000004 HC RX 250 GENERAL PHARMACY W/ HCPCS (ALT 636 FOR OP/ED): Performed by: INTERNAL MEDICINE

## 2024-05-17 PROCEDURE — 96413 CHEMO IV INFUSION 1 HR: CPT

## 2024-05-17 PROCEDURE — 96367 TX/PROPH/DG ADDL SEQ IV INF: CPT

## 2024-05-17 RX ORDER — HEPARIN SODIUM,PORCINE/PF 10 UNIT/ML
50 SYRINGE (ML) INTRAVENOUS AS NEEDED
Status: DISCONTINUED | OUTPATIENT
Start: 2024-05-17 | End: 2024-05-17 | Stop reason: HOSPADM

## 2024-05-17 RX ORDER — PALONOSETRON 0.05 MG/ML
0.25 INJECTION, SOLUTION INTRAVENOUS ONCE
Status: COMPLETED | OUTPATIENT
Start: 2024-05-17 | End: 2024-05-17

## 2024-05-17 RX ORDER — CYANOCOBALAMIN 1000 UG/ML
1000 INJECTION, SOLUTION INTRAMUSCULAR; SUBCUTANEOUS ONCE
Status: COMPLETED | OUTPATIENT
Start: 2024-05-17 | End: 2024-05-17

## 2024-05-17 RX ORDER — HEPARIN SODIUM,PORCINE/PF 10 UNIT/ML
50 SYRINGE (ML) INTRAVENOUS AS NEEDED
OUTPATIENT
Start: 2024-05-17

## 2024-05-17 RX ORDER — ALBUTEROL SULFATE 0.83 MG/ML
3 SOLUTION RESPIRATORY (INHALATION) AS NEEDED
Status: DISCONTINUED | OUTPATIENT
Start: 2024-05-17 | End: 2024-05-17 | Stop reason: HOSPADM

## 2024-05-17 RX ORDER — DIPHENHYDRAMINE HYDROCHLORIDE 50 MG/ML
50 INJECTION INTRAMUSCULAR; INTRAVENOUS AS NEEDED
Status: DISCONTINUED | OUTPATIENT
Start: 2024-05-17 | End: 2024-05-17 | Stop reason: HOSPADM

## 2024-05-17 RX ORDER — HEPARIN 100 UNIT/ML
500 SYRINGE INTRAVENOUS AS NEEDED
OUTPATIENT
Start: 2024-05-17

## 2024-05-17 RX ORDER — HEPARIN 100 UNIT/ML
500 SYRINGE INTRAVENOUS AS NEEDED
Status: DISCONTINUED | OUTPATIENT
Start: 2024-05-17 | End: 2024-05-17 | Stop reason: HOSPADM

## 2024-05-17 RX ORDER — FAMOTIDINE 10 MG/ML
20 INJECTION INTRAVENOUS ONCE AS NEEDED
Status: DISCONTINUED | OUTPATIENT
Start: 2024-05-17 | End: 2024-05-17 | Stop reason: HOSPADM

## 2024-05-17 RX ORDER — EPINEPHRINE 0.3 MG/.3ML
0.3 INJECTION SUBCUTANEOUS EVERY 5 MIN PRN
Status: DISCONTINUED | OUTPATIENT
Start: 2024-05-17 | End: 2024-05-17 | Stop reason: HOSPADM

## 2024-05-17 RX ADMIN — SODIUM CHLORIDE 150 MG: 9 INJECTION, SOLUTION INTRAVENOUS at 09:59

## 2024-05-17 RX ADMIN — PEMETREXED DISODIUM 1200 MG: 500 INJECTION, POWDER, LYOPHILIZED, FOR SOLUTION INTRAVENOUS at 11:11

## 2024-05-17 RX ADMIN — DEXAMETHASONE SODIUM PHOSPHATE 12 MG: 10 INJECTION, SOLUTION INTRAMUSCULAR; INTRAVENOUS at 10:34

## 2024-05-17 RX ADMIN — CYANOCOBALAMIN 1000 MCG: 1000 INJECTION, SOLUTION INTRAMUSCULAR at 10:34

## 2024-05-17 RX ADMIN — CARBOPLATIN 557 MG: 10 INJECTION, SOLUTION INTRAVENOUS at 11:26

## 2024-05-17 RX ADMIN — PALONOSETRON 250 MCG: 0.05 INJECTION, SOLUTION INTRAVENOUS at 09:59

## 2024-05-17 ASSESSMENT — PAIN SCALES - GENERAL
PAINLEVEL_OUTOF10: 0-NO PAIN
PAINLEVEL: 0-NO PAIN

## 2024-05-17 NOTE — SIGNIFICANT EVENT

## 2024-05-20 ENCOUNTER — TELEPHONE (OUTPATIENT)
Dept: HEMATOLOGY/ONCOLOGY | Facility: CLINIC | Age: 74
End: 2024-05-20
Payer: MEDICARE

## 2024-05-20 NOTE — TELEPHONE ENCOUNTER
Pt called on call clinic due to having bright red blood noted at end of void x 1 on Sunday after golf. He then noted dark brown urine passed x 1 episode after that. Urine is now clear and denies any pain, frequency or odor. Reviewed with pt to call if bleeding returns, fevers or any other concerns to alert this clinic. Pt verbalizes understanding.

## 2024-05-26 ENCOUNTER — HOSPITAL ENCOUNTER (INPATIENT)
Facility: HOSPITAL | Age: 74
LOS: 1 days | Discharge: HOME | DRG: 390 | End: 2024-05-27
Attending: EMERGENCY MEDICINE | Admitting: SURGERY
Payer: MEDICARE

## 2024-05-26 ENCOUNTER — APPOINTMENT (OUTPATIENT)
Dept: RADIOLOGY | Facility: HOSPITAL | Age: 74
DRG: 390 | End: 2024-05-26
Payer: MEDICARE

## 2024-05-26 DIAGNOSIS — K56.609 SMALL BOWEL OBSTRUCTION (MULTI): Primary | ICD-10-CM

## 2024-05-26 LAB
ALBUMIN SERPL BCP-MCNC: 4.1 G/DL (ref 3.4–5)
ALP SERPL-CCNC: 80 U/L (ref 33–136)
ALT SERPL W P-5'-P-CCNC: 17 U/L (ref 10–52)
ANION GAP SERPL CALC-SCNC: 12 MMOL/L (ref 10–20)
AST SERPL W P-5'-P-CCNC: 13 U/L (ref 9–39)
BASOPHILS # BLD AUTO: 0.02 X10*3/UL (ref 0–0.1)
BASOPHILS NFR BLD AUTO: 0.3 %
BILIRUB SERPL-MCNC: 0.3 MG/DL (ref 0–1.2)
BUN SERPL-MCNC: 10 MG/DL (ref 6–23)
CALCIUM SERPL-MCNC: 9 MG/DL (ref 8.6–10.3)
CHLORIDE SERPL-SCNC: 106 MMOL/L (ref 98–107)
CO2 SERPL-SCNC: 22 MMOL/L (ref 21–32)
CREAT SERPL-MCNC: 1 MG/DL (ref 0.5–1.3)
EGFRCR SERPLBLD CKD-EPI 2021: 79 ML/MIN/1.73M*2
EOSINOPHIL # BLD AUTO: 0.08 X10*3/UL (ref 0–0.4)
EOSINOPHIL NFR BLD AUTO: 1.3 %
ERYTHROCYTE [DISTWIDTH] IN BLOOD BY AUTOMATED COUNT: 16.1 % (ref 11.5–14.5)
GLUCOSE SERPL-MCNC: 99 MG/DL (ref 74–99)
HCT VFR BLD AUTO: 46.6 % (ref 41–52)
HGB BLD-MCNC: 14.9 G/DL (ref 13.5–17.5)
IMM GRANULOCYTES # BLD AUTO: 0.08 X10*3/UL (ref 0–0.5)
IMM GRANULOCYTES NFR BLD AUTO: 1.3 % (ref 0–0.9)
LIPASE SERPL-CCNC: 5 U/L (ref 9–82)
LYMPHOCYTES # BLD AUTO: 1.49 X10*3/UL (ref 0.8–3)
LYMPHOCYTES NFR BLD AUTO: 24.9 %
MCH RBC QN AUTO: 28.4 PG (ref 26–34)
MCHC RBC AUTO-ENTMCNC: 32 G/DL (ref 32–36)
MCV RBC AUTO: 89 FL (ref 80–100)
MONOCYTES # BLD AUTO: 0.79 X10*3/UL (ref 0.05–0.8)
MONOCYTES NFR BLD AUTO: 13.2 %
NEUTROPHILS # BLD AUTO: 3.52 X10*3/UL (ref 1.6–5.5)
NEUTROPHILS NFR BLD AUTO: 59 %
NRBC BLD-RTO: 0 /100 WBCS (ref 0–0)
PLATELET # BLD AUTO: 186 X10*3/UL (ref 150–450)
POTASSIUM SERPL-SCNC: 3.9 MMOL/L (ref 3.5–5.3)
PROT SERPL-MCNC: 7.4 G/DL (ref 6.4–8.2)
RBC # BLD AUTO: 5.24 X10*6/UL (ref 4.5–5.9)
SODIUM SERPL-SCNC: 136 MMOL/L (ref 136–145)
WBC # BLD AUTO: 6 X10*3/UL (ref 4.4–11.3)

## 2024-05-26 PROCEDURE — 96374 THER/PROPH/DIAG INJ IV PUSH: CPT

## 2024-05-26 PROCEDURE — C9113 INJ PANTOPRAZOLE SODIUM, VIA: HCPCS

## 2024-05-26 PROCEDURE — 99223 1ST HOSP IP/OBS HIGH 75: CPT | Performed by: SURGERY

## 2024-05-26 PROCEDURE — 2550000001 HC RX 255 CONTRASTS: Performed by: EMERGENCY MEDICINE

## 2024-05-26 PROCEDURE — 96375 TX/PRO/DX INJ NEW DRUG ADDON: CPT

## 2024-05-26 PROCEDURE — 96361 HYDRATE IV INFUSION ADD-ON: CPT

## 2024-05-26 PROCEDURE — 74018 RADEX ABDOMEN 1 VIEW: CPT | Mod: FOREIGN READ | Performed by: RADIOLOGY

## 2024-05-26 PROCEDURE — 74018 RADEX ABDOMEN 1 VIEW: CPT

## 2024-05-26 PROCEDURE — 99285 EMERGENCY DEPT VISIT HI MDM: CPT | Mod: 25

## 2024-05-26 PROCEDURE — 99222 1ST HOSP IP/OBS MODERATE 55: CPT

## 2024-05-26 PROCEDURE — 74177 CT ABD & PELVIS W/CONTRAST: CPT | Mod: FOREIGN READ | Performed by: RADIOLOGY

## 2024-05-26 PROCEDURE — 2500000004 HC RX 250 GENERAL PHARMACY W/ HCPCS (ALT 636 FOR OP/ED)

## 2024-05-26 PROCEDURE — 2500000002 HC RX 250 W HCPCS SELF ADMINISTERED DRUGS (ALT 637 FOR MEDICARE OP, ALT 636 FOR OP/ED): Mod: MUE

## 2024-05-26 PROCEDURE — 85025 COMPLETE CBC W/AUTO DIFF WBC: CPT | Performed by: EMERGENCY MEDICINE

## 2024-05-26 PROCEDURE — 74177 CT ABD & PELVIS W/CONTRAST: CPT

## 2024-05-26 PROCEDURE — 2500000001 HC RX 250 WO HCPCS SELF ADMINISTERED DRUGS (ALT 637 FOR MEDICARE OP)

## 2024-05-26 PROCEDURE — 2500000004 HC RX 250 GENERAL PHARMACY W/ HCPCS (ALT 636 FOR OP/ED): Performed by: EMERGENCY MEDICINE

## 2024-05-26 PROCEDURE — 83690 ASSAY OF LIPASE: CPT | Performed by: EMERGENCY MEDICINE

## 2024-05-26 PROCEDURE — 80053 COMPREHEN METABOLIC PANEL: CPT | Performed by: EMERGENCY MEDICINE

## 2024-05-26 PROCEDURE — 1100000001 HC PRIVATE ROOM DAILY

## 2024-05-26 PROCEDURE — 71045 X-RAY EXAM CHEST 1 VIEW: CPT | Mod: FOREIGN READ | Performed by: RADIOLOGY

## 2024-05-26 PROCEDURE — 36415 COLL VENOUS BLD VENIPUNCTURE: CPT | Performed by: EMERGENCY MEDICINE

## 2024-05-26 RX ORDER — ONDANSETRON HYDROCHLORIDE 2 MG/ML
4 INJECTION, SOLUTION INTRAVENOUS EVERY 6 HOURS PRN
Status: DISCONTINUED | OUTPATIENT
Start: 2024-05-26 | End: 2024-05-27 | Stop reason: HOSPADM

## 2024-05-26 RX ORDER — MORPHINE SULFATE 4 MG/ML
4 INJECTION, SOLUTION INTRAMUSCULAR; INTRAVENOUS ONCE
Status: COMPLETED | OUTPATIENT
Start: 2024-05-26 | End: 2024-05-26

## 2024-05-26 RX ORDER — HYDRALAZINE HYDROCHLORIDE 20 MG/ML
5 INJECTION INTRAMUSCULAR; INTRAVENOUS EVERY 6 HOURS PRN
Status: DISCONTINUED | OUTPATIENT
Start: 2024-05-26 | End: 2024-05-27 | Stop reason: HOSPADM

## 2024-05-26 RX ORDER — ALPRAZOLAM 0.25 MG/1
0.5 TABLET ORAL NIGHTLY PRN
Status: DISCONTINUED | OUTPATIENT
Start: 2024-05-26 | End: 2024-05-27 | Stop reason: HOSPADM

## 2024-05-26 RX ORDER — PANTOPRAZOLE SODIUM 40 MG/10ML
40 INJECTION, POWDER, LYOPHILIZED, FOR SOLUTION INTRAVENOUS DAILY
Status: DISCONTINUED | OUTPATIENT
Start: 2024-05-26 | End: 2024-05-27 | Stop reason: HOSPADM

## 2024-05-26 RX ORDER — SODIUM CHLORIDE 9 MG/ML
100 INJECTION, SOLUTION INTRAVENOUS CONTINUOUS
Status: DISCONTINUED | OUTPATIENT
Start: 2024-05-26 | End: 2024-05-26

## 2024-05-26 RX ORDER — DEXTROSE MONOHYDRATE, SODIUM CHLORIDE, AND POTASSIUM CHLORIDE 50; 1.49; 4.5 G/1000ML; G/1000ML; G/1000ML
75 INJECTION, SOLUTION INTRAVENOUS CONTINUOUS
Status: DISCONTINUED | OUTPATIENT
Start: 2024-05-26 | End: 2024-05-27

## 2024-05-26 RX ORDER — LISINOPRIL 5 MG/1
2.5 TABLET ORAL DAILY
Status: DISCONTINUED | OUTPATIENT
Start: 2024-05-26 | End: 2024-05-27 | Stop reason: HOSPADM

## 2024-05-26 RX ORDER — TOPIRAMATE 25 MG/1
12.5 TABLET ORAL DAILY
Status: DISCONTINUED | OUTPATIENT
Start: 2024-05-26 | End: 2024-05-27 | Stop reason: HOSPADM

## 2024-05-26 RX ORDER — HYDROMORPHONE HYDROCHLORIDE 1 MG/ML
1 INJECTION, SOLUTION INTRAMUSCULAR; INTRAVENOUS; SUBCUTANEOUS
Status: DISCONTINUED | OUTPATIENT
Start: 2024-05-26 | End: 2024-05-27 | Stop reason: HOSPADM

## 2024-05-26 RX ORDER — ONDANSETRON HYDROCHLORIDE 2 MG/ML
4 INJECTION, SOLUTION INTRAVENOUS ONCE
Status: COMPLETED | OUTPATIENT
Start: 2024-05-26 | End: 2024-05-26

## 2024-05-26 RX ORDER — ROSUVASTATIN CALCIUM 10 MG/1
5 TABLET, COATED ORAL DAILY
Status: DISCONTINUED | OUTPATIENT
Start: 2024-05-26 | End: 2024-05-27 | Stop reason: HOSPADM

## 2024-05-26 RX ORDER — ENOXAPARIN SODIUM 100 MG/ML
40 INJECTION SUBCUTANEOUS EVERY 24 HOURS
Status: DISCONTINUED | OUTPATIENT
Start: 2024-05-26 | End: 2024-05-27 | Stop reason: HOSPADM

## 2024-05-26 RX ORDER — HYDROMORPHONE HYDROCHLORIDE 0.2 MG/ML
0.2 INJECTION INTRAMUSCULAR; INTRAVENOUS; SUBCUTANEOUS
Status: DISCONTINUED | OUTPATIENT
Start: 2024-05-26 | End: 2024-05-27 | Stop reason: HOSPADM

## 2024-05-26 RX ADMIN — POTASSIUM CHLORIDE, DEXTROSE MONOHYDRATE AND SODIUM CHLORIDE 100 ML/HR: 150; 5; 450 INJECTION, SOLUTION INTRAVENOUS at 12:27

## 2024-05-26 RX ADMIN — HYDROMORPHONE HYDROCHLORIDE 0.5 MG: 1 INJECTION, SOLUTION INTRAMUSCULAR; INTRAVENOUS; SUBCUTANEOUS at 11:26

## 2024-05-26 RX ADMIN — ROSUVASTATIN CALCIUM 5 MG: 10 TABLET, FILM COATED ORAL at 12:40

## 2024-05-26 RX ADMIN — POTASSIUM CHLORIDE, DEXTROSE MONOHYDRATE AND SODIUM CHLORIDE 75 ML/HR: 150; 5; 450 INJECTION, SOLUTION INTRAVENOUS at 22:50

## 2024-05-26 RX ADMIN — SODIUM CHLORIDE 200 ML/HR: 9 INJECTION, SOLUTION INTRAVENOUS at 07:53

## 2024-05-26 RX ADMIN — MORPHINE SULFATE 4 MG: 4 INJECTION, SOLUTION INTRAMUSCULAR; INTRAVENOUS at 03:02

## 2024-05-26 RX ADMIN — TOPIRAMATE 12.5 MG: 25 TABLET, FILM COATED ORAL at 12:40

## 2024-05-26 RX ADMIN — ENOXAPARIN SODIUM 40 MG: 40 INJECTION SUBCUTANEOUS at 12:39

## 2024-05-26 RX ADMIN — ONDANSETRON 4 MG: 2 INJECTION, SOLUTION INTRAMUSCULAR; INTRAVENOUS at 02:15

## 2024-05-26 RX ADMIN — LISINOPRIL 2.5 MG: 5 TABLET ORAL at 12:40

## 2024-05-26 RX ADMIN — HYDROMORPHONE HYDROCHLORIDE 0.2 MG: 0.2 INJECTION, SOLUTION INTRAMUSCULAR; INTRAVENOUS; SUBCUTANEOUS at 13:08

## 2024-05-26 RX ADMIN — IOHEXOL 90 ML: 350 INJECTION, SOLUTION INTRAVENOUS at 04:22

## 2024-05-26 RX ADMIN — HYDROMORPHONE HYDROCHLORIDE 1 MG: 1 INJECTION, SOLUTION INTRAMUSCULAR; INTRAVENOUS; SUBCUTANEOUS at 09:16

## 2024-05-26 RX ADMIN — SODIUM CHLORIDE 1000 ML: 9 INJECTION, SOLUTION INTRAVENOUS at 03:02

## 2024-05-26 RX ADMIN — PANTOPRAZOLE SODIUM 40 MG: 40 INJECTION, POWDER, FOR SOLUTION INTRAVENOUS at 13:07

## 2024-05-26 RX ADMIN — ONDANSETRON 4 MG: 2 INJECTION INTRAMUSCULAR; INTRAVENOUS at 09:16

## 2024-05-26 SDOH — SOCIAL STABILITY: SOCIAL NETWORK: HOW OFTEN DO YOU GET TOGETHER WITH FRIENDS OR RELATIVES?: MORE THAN THREE TIMES A WEEK

## 2024-05-26 SDOH — HEALTH STABILITY: MENTAL HEALTH
STRESS IS WHEN SOMEONE FEELS TENSE, NERVOUS, ANXIOUS, OR CAN'T SLEEP AT NIGHT BECAUSE THEIR MIND IS TROUBLED. HOW STRESSED ARE YOU?: ONLY A LITTLE

## 2024-05-26 SDOH — SOCIAL STABILITY: SOCIAL INSECURITY: ARE YOU OR HAVE YOU BEEN THREATENED OR ABUSED PHYSICALLY, EMOTIONALLY, OR SEXUALLY BY ANYONE?: NO

## 2024-05-26 SDOH — HEALTH STABILITY: PHYSICAL HEALTH: ON AVERAGE, HOW MANY MINUTES DO YOU ENGAGE IN EXERCISE AT THIS LEVEL?: 30 MIN

## 2024-05-26 SDOH — ECONOMIC STABILITY: INCOME INSECURITY: IN THE LAST 12 MONTHS, WAS THERE A TIME WHEN YOU WERE NOT ABLE TO PAY THE MORTGAGE OR RENT ON TIME?: NO

## 2024-05-26 SDOH — SOCIAL STABILITY: SOCIAL INSECURITY: DO YOU FEEL ANYONE HAS EXPLOITED OR TAKEN ADVANTAGE OF YOU FINANCIALLY OR OF YOUR PERSONAL PROPERTY?: NO

## 2024-05-26 SDOH — ECONOMIC STABILITY: HOUSING INSECURITY
IN THE LAST 12 MONTHS, WAS THERE A TIME WHEN YOU DID NOT HAVE A STEADY PLACE TO SLEEP OR SLEPT IN A SHELTER (INCLUDING NOW)?: NO

## 2024-05-26 SDOH — ECONOMIC STABILITY: FOOD INSECURITY: WITHIN THE PAST 12 MONTHS, YOU WORRIED THAT YOUR FOOD WOULD RUN OUT BEFORE YOU GOT THE MONEY TO BUY MORE.: NEVER TRUE

## 2024-05-26 SDOH — ECONOMIC STABILITY: FOOD INSECURITY: WITHIN THE PAST 12 MONTHS, THE FOOD YOU BOUGHT JUST DIDN'T LAST AND YOU DIDN'T HAVE MONEY TO GET MORE.: NEVER TRUE

## 2024-05-26 SDOH — ECONOMIC STABILITY: HOUSING INSECURITY: IN THE LAST 12 MONTHS, HOW MANY PLACES HAVE YOU LIVED?: 1

## 2024-05-26 SDOH — HEALTH STABILITY: MENTAL HEALTH
DO YOU FEEL STRESS - TENSE, RESTLESS, NERVOUS, OR ANXIOUS, OR UNABLE TO SLEEP AT NIGHT BECAUSE YOUR MIND IS TROUBLED ALL THE TIME - THESE DAYS?: ONLY A LITTLE

## 2024-05-26 SDOH — SOCIAL STABILITY: SOCIAL INSECURITY: WERE YOU ABLE TO COMPLETE ALL THE BEHAVIORAL HEALTH SCREENINGS?: YES

## 2024-05-26 SDOH — ECONOMIC STABILITY: FOOD INSECURITY: WITHIN THE PAST 12 MONTHS, YOU WORRIED THAT YOUR FOOD WOULD RUN OUT BEFORE YOU GOT MONEY TO BUY MORE.: NEVER TRUE

## 2024-05-26 SDOH — ECONOMIC STABILITY: FOOD INSECURITY: HOW HARD IS IT FOR YOU TO PAY FOR THE VERY BASICS LIKE FOOD, HOUSING, MEDICAL CARE, AND HEATING?: NOT HARD AT ALL

## 2024-05-26 SDOH — ECONOMIC STABILITY: INCOME INSECURITY: HOW HARD IS IT FOR YOU TO PAY FOR THE VERY BASICS LIKE FOOD, HOUSING, MEDICAL CARE, AND HEATING?: NOT HARD AT ALL

## 2024-05-26 SDOH — ECONOMIC STABILITY: HOUSING INSECURITY: IN THE LAST 12 MONTHS, WAS THERE A TIME WHEN YOU WERE NOT ABLE TO PAY THE MORTGAGE OR RENT ON TIME?: NO

## 2024-05-26 SDOH — SOCIAL STABILITY: SOCIAL NETWORK
IN A TYPICAL WEEK, HOW MANY TIMES DO YOU TALK ON THE PHONE WITH FAMILY, FRIENDS, OR NEIGHBORS?: MORE THAN THREE TIMES A WEEK

## 2024-05-26 SDOH — SOCIAL STABILITY: SOCIAL INSECURITY: HAVE YOU HAD THOUGHTS OF HARMING ANYONE ELSE?: NO

## 2024-05-26 SDOH — ECONOMIC STABILITY: TRANSPORTATION INSECURITY
IN THE PAST 12 MONTHS, HAS LACK OF TRANSPORTATION KEPT YOU FROM MEETINGS, WORK, OR FROM GETTING THINGS NEEDED FOR DAILY LIVING?: NO

## 2024-05-26 SDOH — HEALTH STABILITY: PHYSICAL HEALTH: ON AVERAGE, HOW MANY DAYS PER WEEK DO YOU ENGAGE IN MODERATE TO STRENUOUS EXERCISE (LIKE A BRISK WALK)?: 2 DAYS

## 2024-05-26 SDOH — SOCIAL STABILITY: SOCIAL INSECURITY: HAS ANYONE EVER THREATENED TO HURT YOUR FAMILY OR YOUR PETS?: NO

## 2024-05-26 SDOH — ECONOMIC STABILITY: TRANSPORTATION INSECURITY
IN THE PAST 12 MONTHS, HAS THE LACK OF TRANSPORTATION KEPT YOU FROM MEDICAL APPOINTMENTS OR FROM GETTING MEDICATIONS?: NO

## 2024-05-26 SDOH — ECONOMIC STABILITY: TRANSPORTATION INSECURITY: IN THE PAST 12 MONTHS, HAS LACK OF TRANSPORTATION KEPT YOU FROM MEDICAL APPOINTMENTS OR FROM GETTING MEDICATIONS?: NO

## 2024-05-26 SDOH — SOCIAL STABILITY: SOCIAL INSECURITY: ARE THERE ANY APPARENT SIGNS OF INJURIES/BEHAVIORS THAT COULD BE RELATED TO ABUSE/NEGLECT?: NO

## 2024-05-26 SDOH — SOCIAL STABILITY: SOCIAL INSECURITY: HAVE YOU HAD ANY THOUGHTS OF HARMING ANYONE ELSE?: NO

## 2024-05-26 SDOH — SOCIAL STABILITY: SOCIAL INSECURITY: DO YOU FEEL UNSAFE GOING BACK TO THE PLACE WHERE YOU ARE LIVING?: NO

## 2024-05-26 SDOH — SOCIAL STABILITY: SOCIAL INSECURITY: DOES ANYONE TRY TO KEEP YOU FROM HAVING/CONTACTING OTHER FRIENDS OR DOING THINGS OUTSIDE YOUR HOME?: NO

## 2024-05-26 SDOH — SOCIAL STABILITY: SOCIAL INSECURITY: ABUSE: ADULT

## 2024-05-26 ASSESSMENT — LIFESTYLE VARIABLES
TOTAL SCORE: 0
SKIP TO QUESTIONS 9-10: 1
PRESCIPTION_ABUSE_PAST_12_MONTHS: NO
SUBSTANCE_ABUSE_PAST_12_MONTHS: NO
EVER HAD A DRINK FIRST THING IN THE MORNING TO STEADY YOUR NERVES TO GET RID OF A HANGOVER: NO
EVER FELT BAD OR GUILTY ABOUT YOUR DRINKING: NO
HOW OFTEN DO YOU HAVE 6 OR MORE DRINKS ON ONE OCCASION: NEVER
HAVE PEOPLE ANNOYED YOU BY CRITICIZING YOUR DRINKING: NO
HOW MANY STANDARD DRINKS CONTAINING ALCOHOL DO YOU HAVE ON A TYPICAL DAY: PATIENT DOES NOT DRINK
AUDIT-C TOTAL SCORE: 0
HOW OFTEN DO YOU HAVE A DRINK CONTAINING ALCOHOL: NEVER
AUDIT-C TOTAL SCORE: 0
HAVE YOU EVER FELT YOU SHOULD CUT DOWN ON YOUR DRINKING: NO

## 2024-05-26 ASSESSMENT — ACTIVITIES OF DAILY LIVING (ADL)
DRESSING YOURSELF: INDEPENDENT
HEARING - LEFT EAR: FUNCTIONAL
ADEQUATE_TO_COMPLETE_ADL: YES
GROOMING: INDEPENDENT
JUDGMENT_ADEQUATE_SAFELY_COMPLETE_DAILY_ACTIVITIES: YES
HEARING - RIGHT EAR: FUNCTIONAL
PATIENT'S MEMORY ADEQUATE TO SAFELY COMPLETE DAILY ACTIVITIES?: YES
TOILETING: INDEPENDENT
BATHING: INDEPENDENT
WALKS IN HOME: INDEPENDENT
LACK_OF_TRANSPORTATION: NO
FEEDING YOURSELF: INDEPENDENT

## 2024-05-26 ASSESSMENT — COGNITIVE AND FUNCTIONAL STATUS - GENERAL
MOBILITY SCORE: 24
DAILY ACTIVITIY SCORE: 24
PATIENT BASELINE BEDBOUND: NO

## 2024-05-26 ASSESSMENT — PAIN - FUNCTIONAL ASSESSMENT
PAIN_FUNCTIONAL_ASSESSMENT: 0-10

## 2024-05-26 ASSESSMENT — PATIENT HEALTH QUESTIONNAIRE - PHQ9
SUM OF ALL RESPONSES TO PHQ9 QUESTIONS 1 & 2: 0
2. FEELING DOWN, DEPRESSED OR HOPELESS: NOT AT ALL
1. LITTLE INTEREST OR PLEASURE IN DOING THINGS: NOT AT ALL

## 2024-05-26 ASSESSMENT — PAIN SCALES - GENERAL
PAINLEVEL_OUTOF10: 2
PAINLEVEL_OUTOF10: 8
PAINLEVEL_OUTOF10: 4
PAINLEVEL_OUTOF10: 6
PAINLEVEL_OUTOF10: 4
PAINLEVEL_OUTOF10: 0 - NO PAIN
PAINLEVEL_OUTOF10: 7
PAINLEVEL_OUTOF10: 0 - NO PAIN

## 2024-05-26 ASSESSMENT — PAIN DESCRIPTION - LOCATION: LOCATION: ABDOMEN

## 2024-05-26 ASSESSMENT — PAIN DESCRIPTION - PAIN TYPE: TYPE: ACUTE PAIN

## 2024-05-26 NOTE — CARE PLAN
The patient's goals for the shift include      The clinical goals for the shift include to remain free from pain and discomfort      Problem: Pain  Goal: My pain/discomfort is manageable  Outcome: Progressing     Problem: Safety  Goal: Patient will be injury free during hospitalization  Outcome: Progressing  Goal: I will remain free of falls  Outcome: Progressing     Problem: Daily Care  Goal: Daily care needs are met  Outcome: Progressing     Problem: Psychosocial Needs  Goal: Demonstrates ability to cope with hospitalization/illness  Outcome: Progressing  Goal: Collaborate with me, my family, and caregiver to identify my specific goals  Outcome: Progressing  Flowsheets (Taken 5/26/2024 1257)  Cultural Requests During Hospitalization: n/a  Spiritual Requests During Hospitalization: n/a     Problem: Discharge Barriers  Goal: My discharge needs are met  Outcome: Progressing

## 2024-05-26 NOTE — ED PROVIDER NOTES
Patient is pending evaluation by surgery team at time of signout.  He presented periumbilical abdominal pain found to have a small bowel obstruction.  NG tube was placed and surgery consulted.    Pending surgery evaluation for admission to surgery versus medicine service.      NG tube coiled, removed. Surgery will admit to service, okay to hold off on NG tube at this time.     Minerva Trujillo MD  05/26/24 0818

## 2024-05-26 NOTE — H&P
History Of Present Illness  Tao Renee is a 73 y.o. male with PMH significant for HTN, HLD, AAA w/o rupture, CAD, GERD, SAURABH, diverticulitis s/p coker 1+2, prior SBO, and adenocarcinoma of Rt lung on chemo (last treatment 5/17) who presented to [facility] ED early AM on 5/26 for abdominal pain. Pt states he was in his normal state of health yesterday. He awoke at about 2200 with abdominal pain and distention. He took laxatives without relief and then developed nausea without emesis. Last BM yesterday, he has not passed any flatus today. He currently denies nausea, and states his abd pain is improved since morphine administration. Pt reports that he had an SBO about 5 yrs ago that resolved quickly with conservative management.     ED course: Quite hypertensive but HDS and afebrile. CBC and CMP unremarkable. CT A/P shows centrally dilated loops of small bowel with possible transition point in anterior mid abdomen; he also has LLQ incisional hernia and Rt inguinal hernia, both non-obstructed. NGT placement was attempted in ED without success.      Past Medical History  As above    Surgical History  Rt lung lobectomy, Coker stage 1 +2 (sigmoid resection w/ colostomy and reversal)     Social History  He reports that he quit smoking about 23 years ago. His smoking use included cigarettes. He started smoking about 57 years ago. He has a 68 pack-year smoking history. He has never used smokeless tobacco. He reports current alcohol use of about 6.0 standard drinks of alcohol per week. He reports that he does not use drugs.    Family History  Family History   Problem Relation Name Age of Onset    Hypertension Mother      Coronary artery disease Mother      Hypertension Father      Coronary artery disease Father      Heart attack Father          Allergies  Atorvastatin and Azithromycin     Physical Exam  Constitutional:       General: He is not in acute distress.     Appearance: Normal appearance. He is not ill-appearing.  "  HENT:      Mouth/Throat:      Mouth: Mucous membranes are moist.   Eyes:      General: No scleral icterus.  Cardiovascular:      Rate and Rhythm: Normal rate and regular rhythm.      Heart sounds: Normal heart sounds.   Pulmonary:      Effort: Pulmonary effort is normal. No respiratory distress.      Breath sounds: Normal breath sounds.   Abdominal:      General: Bowel sounds are normal. There is distension (mild-mod).      Palpations: Abdomen is soft.      Tenderness: There is abdominal tenderness (periumbilical, minimal). There is no guarding or rebound.      Hernia: A hernia (LLQ incisional hernia - soft, partially reducible; Rt inguinal hernia - soft, spontaneously reducible) is present.      Comments: Diastasis recti noted   Genitourinary:     Penis: Normal.    Musculoskeletal:         General: Normal range of motion.   Skin:     General: Skin is warm and dry.      Coloration: Skin is not jaundiced or pale.      Comments: Very tan   Neurological:      General: No focal deficit present.      Mental Status: He is alert and oriented to person, place, and time.   Psychiatric:         Mood and Affect: Mood normal.         Behavior: Behavior normal.          Last Recorded Vitals  Blood pressure 171/80, pulse 74, temperature 36.3 °C (97.3 °F), resp. rate 16, height 1.905 m (6' 3\"), weight 99.8 kg (220 lb), SpO2 99%.    Relevant Results  Results for orders placed or performed during the hospital encounter of 05/26/24 (from the past 24 hour(s))   CBC and Auto Differential   Result Value Ref Range    WBC 6.0 4.4 - 11.3 x10*3/uL    nRBC 0.0 0.0 - 0.0 /100 WBCs    RBC 5.24 4.50 - 5.90 x10*6/uL    Hemoglobin 14.9 13.5 - 17.5 g/dL    Hematocrit 46.6 41.0 - 52.0 %    MCV 89 80 - 100 fL    MCH 28.4 26.0 - 34.0 pg    MCHC 32.0 32.0 - 36.0 g/dL    RDW 16.1 (H) 11.5 - 14.5 %    Platelets 186 150 - 450 x10*3/uL    Neutrophils % 59.0 40.0 - 80.0 %    Immature Granulocytes %, Automated 1.3 (H) 0.0 - 0.9 %    Lymphocytes % 24.9 " 13.0 - 44.0 %    Monocytes % 13.2 2.0 - 10.0 %    Eosinophils % 1.3 0.0 - 6.0 %    Basophils % 0.3 0.0 - 2.0 %    Neutrophils Absolute 3.52 1.60 - 5.50 x10*3/uL    Immature Granulocytes Absolute, Automated 0.08 0.00 - 0.50 x10*3/uL    Lymphocytes Absolute 1.49 0.80 - 3.00 x10*3/uL    Monocytes Absolute 0.79 0.05 - 0.80 x10*3/uL    Eosinophils Absolute 0.08 0.00 - 0.40 x10*3/uL    Basophils Absolute 0.02 0.00 - 0.10 x10*3/uL   Comprehensive metabolic panel   Result Value Ref Range    Glucose 99 74 - 99 mg/dL    Sodium 136 136 - 145 mmol/L    Potassium 3.9 3.5 - 5.3 mmol/L    Chloride 106 98 - 107 mmol/L    Bicarbonate 22 21 - 32 mmol/L    Anion Gap 12 10 - 20 mmol/L    Urea Nitrogen 10 6 - 23 mg/dL    Creatinine 1.00 0.50 - 1.30 mg/dL    eGFR 79 >60 mL/min/1.73m*2    Calcium 9.0 8.6 - 10.3 mg/dL    Albumin 4.1 3.4 - 5.0 g/dL    Alkaline Phosphatase 80 33 - 136 U/L    Total Protein 7.4 6.4 - 8.2 g/dL    AST 13 9 - 39 U/L    Bilirubin, Total 0.3 0.0 - 1.2 mg/dL    ALT 17 10 - 52 U/L   Lipase   Result Value Ref Range    Lipase 5 (L) 9 - 82 U/L       XR chest abdomen for OG NG placement    Result Date: 5/26/2024  STUDY: Single View Chest and Abdomen Radiographs; 05/26/2024, 07:04AM INDICATION: NG placement. COMPARISON: 02/07/2024, 01/29/2024 XR chest. ACCESSION NUMBER(S): [id number] ORDERING CLINICIAN: Minerva Trujillo TECHNIQUE:  Single view of the chest and one view(s) of the abdomen. FINDINGS:  CARDIOMEDIASTINAL SILHOUETTE: Cardiomediastinal silhouette is normal in size and configuration. Enteric tube is coiled in the cervical esophagus.  LUNGS: There is a moderate to large right pleural effusion at the right lung base.  ABDOMEN: Bowel gas pattern is normal without obstruction or ileus.  There are no convincing calculi or abnormal calcifications.  BONES: No acute osseous changes    Enteric tube is coiled in the cervical esophagus and should be removed and reattempted. Large right pleural effusion. No acute abdominal  pathology identified. NOTIFICATION:  The critical results of the study were discussed with, and acknowledged by Dr. Minerva Trujillo by telephone on 05/26/2024 at 0724. Signed by Gunner Chau    CT abdomen pelvis w IV contrast    Result Date: 5/26/2024  STUDY: CT Abdomen and Pelvis with IV Contrast; 05/26/2024, 04:35 AM INDICATION: Abdominal pain for a unspecified amount of time. COMPARISON: None Available. ACCESSION NUMBER(S): [id number] ORDERING CLINICIAN: CARMEN ALANIS TECHNIQUE: CT of the abdomen and pelvis was performed.  Contiguous axial images were obtained at 3 mm slice thickness through the abdomen and pelvis. Coronal and sagittal reconstructions at 3 mm slice thickness were performed.  Omnipaque 350 90 mL was administered intravenously.  FINDINGS: LOWER CHEST: Cardiac size is normal with moderate calcified coronary atherosclerosis.  Mild calcified plaque is seen in the included descending thoracic aorta.  No pericardial effusion.  Increased AP diameter of the thorax at the lung bases suggests chronic changes of COPD.  Moderate pleural effusion is seen at the right lung base. There is a small sliding-type hiatal hernia.   ABDOMEN:  LIVER: Numerous simple fluid density cysts are seen throughout the liver, predominantly in the left hepatic lobe.  No hepatomegaly.  Smooth surface contour.  Normal attenuation.  BILE DUCTS: No intrahepatic or extrahepatic biliary ductal dilatation.  GALLBLADDER: Gallbladder is unremarkable.  STOMACH: No abnormalities identified.  PANCREAS: Mild fatty atrophy of the pancreas is noted.  Pancreatic head calcifications are noted which may indicate changes of chronic pancreatitis.  No masses or ductal dilatation.  SPLEEN: No splenomegaly or focal splenic lesion.  ADRENAL GLANDS: No thickening or nodules.  KIDNEYS AND URETERS: Kidneys are normal in size and location.  There is mild renal cortical thinning and perinephric stranding bilaterally.  Simple fluid density cysts are seen in both  kidneys.  Statistically, these are most likely benign.  Follow up only if clinically indicated.  No renal or ureteral calculi.  PELVIS:  BLADDER: No abnormalities identified.  REPRODUCTIVE ORGANS: Prostate is upper normal in size.  BOWEL: A 3.6 cm duodenal diverticulum is seen adjacent to the pancreatic head.  Moderately dilated, fluid and stool-filled loops of small bowel are seen in the abdomen and pelvis consistent with a developing moderate to high-grade mid small bowel obstruction with transition point in the anterior aspect of the mid abdomen on axial images 94 through 105 of series 601B, most likely secondary to adhesions in the anterior abdomen with mild wall thickening and inflammation of the small bowel at the site of the obstruction.  Appendix extends into a moderate-sized right inguinal hernia but appears unremarkable. Terminal ileum is unremarkable.  Diverticulosis is present throughout the colon.  There is evidence of prior rectosigmoid anastomosis.  Mild wall thickening of the colon is seen at the anastomosis.   VESSELS: Abdominal aorta is normal in caliber.  Moderate calcified atheromatous plaque is seen in the abdominal aorta and iliac arteries.   PERITONEUM/RETROPERITONEUM/LYMPH NODES: No free fluid.  No pneumoperitoneum. No lymphadenopathy.  ABDOMINAL WALL: Anterior abdominal wall postoperative changes are suspected with small fat-containing incisional hernias and question of prior hernia repair.  There is an additional fat-containing left mid abdominal wall hernia which may represent site of prior ostomy with the mouth of the hernia measuring 2.6 cm in diameter and the hernia sac measuring up to approximately 9.6 cm in diameter.  SOFT TISSUES: No abnormalities identified.  BONES: No acute fracture or aggressive osseous lesion.    Moderate to high-grade developing mid small bowel obstruction with transition point in the anterior mid abdomen, likely secondary to adhesions. Small fat-containing  incisional hernias along midline in the abdomen, as well as a moderate to large fat-containing left mid abdominal wall hernia site of prior ostomy. Chronic changes suggesting COPD with a moderate right pleural effusion. Mild fatty atrophy of the pancreas with suggestion of changes of chronic pancreatitis. 3.6 cm duodenal diverticulum. Colonic diverticulosis. Moderate-sized right inguinal hernia containing the appendix. Additional chronic changes as described. NOTIFICATION:  The critical results of the study were discussed with, and acknowledged by Dr. Cony Khan by telephone on 05/26/2024 at 0459. Signed by Gunner Chau        Assessment/Plan   Tao Renee is a 73 y.o. male with PMH significant for HTN, HLD, AAA w/o rupture, CAD, GERD, SAURABH, diverticulitis s/p smith 1+2, prior SBO, and adenocarcinoma of Rt lung on chemo (last treatment 5/17) who presented to [facility] ED early AM on 5/26 for abdominal pain. CT A/P performed in the Ed shows centrally dilated loops of small bowel with possible transition point in anterior mid abdomen; he also has LLQ incisional hernia and Rt inguinal hernia, both non-obstructed. NGT placement was attempted in ED without success.     Assessment and Plan:    #SBO  - No acute surgical intervention is indicated at this time  - NPO except for PO meds  - Hold off on NGT unless pt develops significant nausea or emesis  - mIVF - D51/2Ns +20 K @ 100 ml/hr  - IV analgesia/antiemetics  - AM labs    Chronic conditions:  #HTN  #HLD  #GERD  - Continue home PO meds as appropriate  - Convert PO protonix to IV until on diet  - PRN hydralazine for SBP >170    DVT ppx: SCDs and SQH    Dispo: Admit to RNF.    Patient seen and discussed with attending surgeon, Dr. Garth Andres PA-C

## 2024-05-26 NOTE — ED PROVIDER NOTES
HPI   Chief Complaint   Patient presents with    Abdominal Pain       Patient presents with periumbilical abdominal pain.  It is intermittent.  Is been going on for the entire day but is worsened over the past several hours.  He has nausea but no vomiting.  Decreased appetite.  Last bowel movement was this morning.  He has a history of stage III lung cancer status post multiple resections and currently receiving chemotherapy.  He has had a large bowel resection for diverticulosis/diverticulitis.  He had an ostomy and then subsequent takedown with anastomosis.  He states his abdomen is distended.                          Bullard Coma Scale Score: 15                     Patient History   Past Medical History:   Diagnosis Date    Adenocarcinoma of right lung (Multi) 09/06/2023    Aneurysm of ascending aorta without rupture (CMS-HCC) 09/06/2023    Arteriosclerosis of coronary artery 09/06/2023    Elevated calcium score 18 Agatston units.(25th percentile)    Arthritis     BPH (benign prostatic hyperplasia)     GERD (gastroesophageal reflux disease)     H/O echocardiogram     Last Echo in 2018    HL (hearing loss)     HTN (hypertension) 09/06/2023    Hyperlipidemia 09/06/2023    Dr. Chou follows    Lung cancer (Multi)     recurrent lung cancer    Shortness of breath     Sleep apnea     Spondyloarthropathy 09/06/2023    Strain of lumbar region 02/02/2024    Thoracic aortic aneurysm (TAA) (CMS-[facility]) 09/06/2023    3.8 - 4 cm     Past Surgical History:   Procedure Laterality Date    BRONCHOSCOPY      COLONOSCOPY  10/14/2015    Colonoscopy (Fiberoptic)    CT ABDOMEN PELVIS ANGIOGRAM W AND/OR WO IV CONTRAST  01/05/2020    CT ABDOMEN PELVIS ANGIOGRAM W AND/OR WO IV CONTRAST 1/5/2020 [facility]    CT GUIDED PERCUTANEOUS BIOPSY LUNG  05/04/2016    CT GUIDED PERCUTANEOUS BIOPSY LUNG 5/4/2016 [facility] [place] [facility]    CT GUIDED PERCUTANEOUS BIOPSY LUNG  10/23/2019    CT GUIDED PERCUTANEOUS BIOPSY LUNG 10/23/2019 [facility] [place] [facility]     CT GUIDED PERCUTANEOUS BIOPSY LUNG  2020    CT GUIDED PERCUTANEOUS BIOPSY LUNG 2020 [facility]    LUNG LOBECTOMY Right     OTHER SURGICAL HISTORY  2016    Thoracoscopy (Therapeutic) W/ Lobectomy    SMALL INTESTINE SURGERY  10/14/2015    Small Bowel Resection     Family History   Problem Relation Name Age of Onset    Hypertension Mother      Coronary artery disease Mother      Hypertension Father      Coronary artery disease Father      Heart attack Father       Social History     Tobacco Use    Smoking status: Former     Current packs/day: 0.00     Average packs/day: 2.0 packs/day for 34.0 years (68.0 ttl pk-yrs)     Types: Cigarettes     Start date:      Quit date:      Years since quittin.4    Smokeless tobacco: Never   Vaping Use    Vaping status: Never Used   Substance Use Topics    Alcohol use: Yes     Alcohol/week: 6.0 standard drinks of alcohol     Types: 6 Standard drinks or equivalent per week     Comment: occasional    Drug use: Never       Physical Exam   ED Triage Vitals [24 0143]   Temperature Heart Rate Respirations BP   36.3 °C (97.3 °F) 76 18 (!) 191/93      Pulse Ox Temp src Heart Rate Source Patient Position   95 % -- -- --      BP Location FiO2 (%)     -- --       Physical Exam  Vitals and nursing note reviewed.   Constitutional:       General: He is not in acute distress.     Appearance: He is well-developed.   HENT:      Head: Normocephalic and atraumatic.   Eyes:      Conjunctiva/sclera: Conjunctivae normal.   Cardiovascular:      Rate and Rhythm: Normal rate and regular rhythm.      Heart sounds: No murmur heard.  Pulmonary:      Effort: Pulmonary effort is normal. No respiratory distress.      Breath sounds: Normal breath sounds.   Abdominal:      General: Bowel sounds are increased. There is distension.      Palpations: Abdomen is soft.      Tenderness: There is generalized abdominal tenderness.   Musculoskeletal:         General: No swelling.       Cervical back: Neck supple.   Skin:     General: Skin is warm and dry.      Capillary Refill: Capillary refill takes less than 2 seconds.   Neurological:      Mental Status: He is alert.   Psychiatric:         Mood and Affect: Mood normal.         ED Course & MDM   Diagnoses as of 05/26/24 0600   Small bowel obstruction (Multi)       Medical Decision Making  Differential diagnosis pancreatitis, small bowel obstruction, cholecystitis, etc.    Patient was given 4 mg morphine 4 mg Zofran intravenously.  Was given a liter normal saline started saline 200 mL/h.  CT abdomen pelvis shows a moderate to high-grade mid small bowel obstruction.  Nasogastric tube was ordered.  Discussed case with surgery Dr. Liang who will come to the emergency department evaluate the patient.  Prior medical records were reviewed.        Procedure  Procedures     Wai Yeh MD  05/26/24 0602

## 2024-05-26 NOTE — CONSULTS
General Surgery Attending Note -Admitting History and Physical    My Acute Care Surgery TIARA (Advanced Practice Provider) evaluated this patient today.  Please see that documentation for details.    I saw the patient with the TIARA today, and personally evaluated and examined the patient.  My findings are consistent with those of the TIARA.        Impression:      This pleasant 73-year-old male presents with evidence of a partial small bowel obstruction.  He underwent Ruba stage I followed a Ruba stage II in roughly 2005 per Dr. Bill Hussein.  Thereafter, the patient did develop an incisional hernia at the colostomy site which has not been repaired, and has not caused an issue.  The patient also has a known right inguinal hernia again which has not been repaired.  He has had 1 previous episode of a partial small bowel obstruction roughly 5 years ago which resulted in short order with conservative therapy.  He has had no other abdominal operations other than the above.    Yesterday evening, the patient developed abdominal distention and bloating, nausea but no emesis.  He had some mild crampy abdominal pain.  His last bowel movement was yesterday morning.  He did pass flatus about midnight last evening.  He is brought to the emergency department very early this morning for further evaluation and treatment.    Workup included a CAT scan of the abdomen pelvis with IV contrast.  I personally reviewed the report and the images.  The patient has some mildly dilated proximal fluid-filled small bowel loops and some decompressed distal loops.  There is a suggestion of a possible transition point anteriorly in the mid to distal small bowel.  There is air and stool scattered throughout the colon.  There is a 27 mm fascial defect in the left lower quadrant at the site of his colostomy hernia and the hernia sac contains fat only.  There is evidence of a right inguinal hernia containing a portion of the cecum.  There is no  free fluid or free air.  No evidence of fluid collection.    Laboratory values on admission overall are satisfactory.  Normal CMP.  CBC is essentially normal also.    The patient does feel better since he has been treated the emergency department with morphine.  He has no further pain.  Nausea has improved.    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  This was diagnosed in 2016.  This is well-documented in his oncologist, Dr. Alexander's, note from 5/16/2024.  He recently started Adjuvant therapy with platinum/pemetrexed x 4 cycles f/by consolidative pembrolizumab beginning Mar 2024.  He has completed 3-4 cycles, with his next cycle scheduled for June 7.    On examination, his abdomen is actually minimally distended and soft with scattered bowel sounds.  The incisional hernia at the colostomy site of the left lower quadrant is not reducible.  He does have a small reducible right inguinal hernia.  His midline incision is well-healed.  There is no evidence of ventral hernia along the midline.  There is no tenderness.      Overall impression is likely partial adhesive small bowel obstruction.  This will likely resolve with conservative nonoperative therapy.    Plan:      Patient will be placed on IV fluids, analgesics, anti-emetics & bowel rest.  Nasogastric decompression only as indicated if the patient develops worsening abdominal pain, distention, nausea or vomiting.  His abdominal exam at the moment is relatively benign..  Initial therapy will be conservative and non-operative.  If the patient fails to improve over the next 24-48 hours, further imaging studies such as a small bowel follow-through or CAT scan imaging may be necessary.  Operative intervention will also be considered at that point, namely a laparotomy for small bowel obstruction, with possible adhesiolysis and small bowel resection.

## 2024-05-26 NOTE — PROGRESS NOTES
Pharmacy Medication History Review    Tao Renee is a 73 y.o. male admitted for Small bowel obstruction (Multi). Pharmacy reviewed the patient's [id number] medications and allergies for accuracy.    The list below reflectives the updated PTA list. Please review each medication in order reconciliation for additional clarification and justification.  Prior to Admission medications    Medication Sig Start Date End Date Taking? Authorizing Provider   acetaminophen (Tylenol) 325 mg tablet Take 2 tablets (650 mg) by mouth every 6 hours.  Patient taking differently: Take 2 tablets (650 mg) by mouth every 6 hours if needed. 1/29/24  Yes FAROOQ Bourgeois   ALPRAZolam (Xanax) 0.5 mg tablet Take 1 tablet (0.5 mg) by mouth as needed at bedtime for anxiety. 3/14/24  Yes Rubio Tariq,    cyanocobalamin (Vitamin B-12) 1,000 mcg tablet Take 1 tablet (1,000 mcg) by mouth once daily.   Yes Historical Provider, MD   fluticasone (Flonase Allergy Relief) 50 mcg/actuation nasal spray Administer 1 spray into each nostril once daily as needed.   Yes Historical Provider, MD   lisinopril 2.5 mg tablet Take 1 tablet (2.5 mg) by mouth once daily. 11/28/23  Yes Rubio Tariq,    mecobalam-folate-B6-B2-betain 1,000 mcg-3,400 mcg DFE-10 mg capsule Take 1 capsule by mouth once daily.   Yes Historical Provider, MD   NON FORMULARY Organic bitter apricot kernels 3-4 a day   Yes Historical Provider, MD   ondansetron (Zofran) 8 mg tablet Take 1 tablet (8 mg) by mouth every 8 hours if needed for nausea or vomiting. 4/5/24  Yes Rakesh Alexander MD   oxyCODONE (Roxicodone) 5 mg immediate release tablet Take 1 tablet (5 mg) by mouth every 6 hours if needed (pain). 1/29/24  Yes FAROOQ Bourgeois   pantoprazole (Protonix) 20 mg EC tablet Take 1 tablet (20 mg) by mouth once daily in the morning. Take before meals. Do not crush, chew, or split.  Patient taking differently: Take 1 tablet (20 mg) by mouth once daily as  needed for heartburn. Do not crush, chew, or split. 4/7/24 4/7/25 Yes Rubio Tariq,    prochlorperazine (Compazine) 10 mg tablet Take 1 tablet (10 mg) by mouth every 6 hours if needed for nausea or vomiting. 4/5/24  Yes Rakesh Alexander MD   pyridoxine HCl, vitamin B6, (VITAMIN B-6 ORAL) Take 100 mg by mouth once daily.   Yes Historical Provider, MD   rosuvastatin (Crestor) 5 mg tablet Take 1 tablet (5 mg) by mouth once daily. 4/27/24 4/27/25 Yes Rossi Chou MD   thiamine 100 mg tablet Take 1 tablet (100 mg) by mouth once daily.   Yes Historical Provider, MD   topiramate (Topamax) 25 mg tablet Take 0.5 tablets (12.5 mg) by mouth once daily. 11/28/23  Yes Rubio Tariq DO   aspirin 81 mg EC tablet Take 1 tablet (81 mg) by mouth once daily.   no Historical Provider, MD   magic mouthwash (lidocaine, diphenhydrAMINE, Maalox 1:1:1) Swish and spit 10 mL every 6 hours if needed for mucositis. 4/26/24  no Rakesh Alexander MD   sildenafil (Viagra) 25 mg tablet Take 1 tablet (25 mg) by mouth. 1 hour before needed   no Historical Provider, MD        The list below reflectives the updated allergy list. Please review each documented allergy for additional clarification and justification.  Allergies  Reviewed by Meghna Courtney on 5/26/2024        Severity Reactions Comments    Atorvastatin Medium Itching     Azithromycin Medium Diarrhea             Below are additional concerns with the patient's PTA list.      Meghna Courtney

## 2024-05-26 NOTE — ED TRIAGE NOTES
Pt presents to ED with c/o abdominal pain that started around 10pm. Pt has hx of lung cancer, receiving chemo every three weeks. Pt denies diarrhea, states some nausea.

## 2024-05-27 VITALS
WEIGHT: 220 LBS | OXYGEN SATURATION: 97 % | BODY MASS INDEX: 27.35 KG/M2 | RESPIRATION RATE: 18 BRPM | TEMPERATURE: 96.8 F | DIASTOLIC BLOOD PRESSURE: 70 MMHG | HEIGHT: 75 IN | HEART RATE: 74 BPM | SYSTOLIC BLOOD PRESSURE: 155 MMHG

## 2024-05-27 PROBLEM — K56.609 SMALL BOWEL OBSTRUCTION (MULTI): Status: RESOLVED | Noted: 2024-05-26 | Resolved: 2024-05-27

## 2024-05-27 LAB
ANION GAP SERPL CALC-SCNC: 9 MMOL/L (ref 10–20)
BUN SERPL-MCNC: 5 MG/DL (ref 6–23)
CALCIUM SERPL-MCNC: 8.3 MG/DL (ref 8.6–10.3)
CHLORIDE SERPL-SCNC: 111 MMOL/L (ref 98–107)
CO2 SERPL-SCNC: 21 MMOL/L (ref 21–32)
CREAT SERPL-MCNC: 0.87 MG/DL (ref 0.5–1.3)
EGFRCR SERPLBLD CKD-EPI 2021: >90 ML/MIN/1.73M*2
ERYTHROCYTE [DISTWIDTH] IN BLOOD BY AUTOMATED COUNT: 15.9 % (ref 11.5–14.5)
GLUCOSE SERPL-MCNC: 106 MG/DL (ref 74–99)
HCT VFR BLD AUTO: 37.6 % (ref 41–52)
HGB BLD-MCNC: 11.9 G/DL (ref 13.5–17.5)
MCH RBC QN AUTO: 28.3 PG (ref 26–34)
MCHC RBC AUTO-ENTMCNC: 31.6 G/DL (ref 32–36)
MCV RBC AUTO: 90 FL (ref 80–100)
NRBC BLD-RTO: 0 /100 WBCS (ref 0–0)
PLATELET # BLD AUTO: 152 X10*3/UL (ref 150–450)
POTASSIUM SERPL-SCNC: 4 MMOL/L (ref 3.5–5.3)
RBC # BLD AUTO: 4.2 X10*6/UL (ref 4.5–5.9)
SODIUM SERPL-SCNC: 137 MMOL/L (ref 136–145)
WBC # BLD AUTO: 3.5 X10*3/UL (ref 4.4–11.3)

## 2024-05-27 PROCEDURE — 99231 SBSQ HOSP IP/OBS SF/LOW 25: CPT | Performed by: REGISTERED NURSE

## 2024-05-27 PROCEDURE — 85027 COMPLETE CBC AUTOMATED: CPT

## 2024-05-27 PROCEDURE — 2500000002 HC RX 250 W HCPCS SELF ADMINISTERED DRUGS (ALT 637 FOR MEDICARE OP, ALT 636 FOR OP/ED): Mod: MUE

## 2024-05-27 PROCEDURE — 2500000004 HC RX 250 GENERAL PHARMACY W/ HCPCS (ALT 636 FOR OP/ED)

## 2024-05-27 PROCEDURE — 2500000001 HC RX 250 WO HCPCS SELF ADMINISTERED DRUGS (ALT 637 FOR MEDICARE OP)

## 2024-05-27 PROCEDURE — 36415 COLL VENOUS BLD VENIPUNCTURE: CPT

## 2024-05-27 PROCEDURE — 99231 SBSQ HOSP IP/OBS SF/LOW 25: CPT | Performed by: SURGERY

## 2024-05-27 PROCEDURE — C9113 INJ PANTOPRAZOLE SODIUM, VIA: HCPCS

## 2024-05-27 PROCEDURE — 82374 ASSAY BLOOD CARBON DIOXIDE: CPT

## 2024-05-27 RX ADMIN — TOPIRAMATE 12.5 MG: 25 TABLET, FILM COATED ORAL at 08:57

## 2024-05-27 RX ADMIN — ROSUVASTATIN CALCIUM 5 MG: 10 TABLET, FILM COATED ORAL at 08:56

## 2024-05-27 RX ADMIN — PANTOPRAZOLE SODIUM 40 MG: 40 INJECTION, POWDER, FOR SOLUTION INTRAVENOUS at 09:21

## 2024-05-27 RX ADMIN — LISINOPRIL 2.5 MG: 5 TABLET ORAL at 08:56

## 2024-05-27 ASSESSMENT — PAIN SCALES - GENERAL: PAINLEVEL_OUTOF10: 0 - NO PAIN

## 2024-05-27 NOTE — DISCHARGE SUMMARY
Discharge Diagnosis  Small bowel obstruction (Multi)    Issues Requiring Follow-Up   Follow up with surgeon as needed. Continue routine medical care/oncology care     Test Results Pending At Discharge  Pending Labs       No current pending labs.            Hospital Course   Tao Renee is a 73 y.o. male with a PMH significant for HTN, HLD, AAA w/o rupture, CAD, GERD, SAURBAH, diverticulitis s/p smith 1+2, prior SBO, and adenocarcinoma of Rt lung on chemo (last treatment 5/17) who presented to [facility] ED early AM on 5/26 for abdominal pain. Pt states he was in his normal state of health yesterday. He awoke at about 2200 with abdominal pain and distention. He took laxatives without relief and then developed nausea without emesis. Last BM yesterday, he has not passed any flatus today. He currently denies nausea, and states his abd pain is improved since morphine administration. Pt reports that he had an SBO about 5 yrs ago that resolved quickly with conservative management.     He underwent Ruba stage I followed a Ruba stage II in roughly 2005 per Dr. Bill Hussein.  Thereafter, the patient did develop an incisional hernia at the colostomy site which has not been repaired, and has not caused an issue.  The patient also has a known right inguinal hernia again which has not been repaired.  He is retired from [employer] and enjoys golfing/is quite active even with his current chemotherapy regimen.     Admitted to general surgery with CT scan findings and exam consistent with early partial SBO. 3 attempts at NG placement were unsuccessful, therefore placement was deferred. Nausea improved. He passed flatus. Diet advanced and he had large BM on day of discharge.    On day of discharge all vital signs, laboratory data, and physical exam findings were reviewed and patient was deemed appropriate for hospital discharge. At the time of discharge, patient's pain was controlled with oral analgesia, patient  was urinating, having BMs, sleeping, and eating well. Patient was discharged with no new prescriptions. Discharge plan was discussed with the patient and all of the patient's questions were answered. Patient verbalized understanding of discharge instructions.     Pertinent Physical Exam At Time of Discharge  Physical Exam  Constitutional:       General: He is not in acute distress.     Appearance: Normal appearance. He is normal weight. He is not ill-appearing or toxic-appearing.   HENT:      Head: Normocephalic.      Nose: Nose normal.   Eyes:      General: No scleral icterus.     Pupils: Pupils are equal, round, and reactive to light.   Cardiovascular:      Rate and Rhythm: Normal rate.      Pulses: Normal pulses.   Pulmonary:      Effort: Pulmonary effort is normal. No respiratory distress.   Abdominal:      General: Bowel sounds are normal. There is distension (Mild).      Palpations: Abdomen is soft.      Tenderness: There is no abdominal tenderness.      Comments: Slightly tympanic to percussion, but no pain. Denies feeling bloated. Midline scar noted. LLQ scar from prior ostomy with small fat-containing hernia that reduces easily without pain. Non tender to palpate. Large BM this AM per patient report    Musculoskeletal:      Right lower leg: No edema.      Left lower leg: No edema.   Skin:     General: Skin is warm.      Comments: Very tanned    Neurological:      Mental Status: He is alert and oriented to person, place, and time.   Psychiatric:         Mood and Affect: Mood normal.         Home Medications     Medication List      CONTINUE taking these medications     acetaminophen 325 mg tablet; Commonly known as: Tylenol; Take 2 tablets   (650 mg) by mouth every 6 hours.   ALPRAZolam 0.5 mg tablet; Commonly known as: Xanax; Take 1 tablet (0.5   mg) by mouth as needed at bedtime for anxiety.   aspirin 81 mg EC tablet   cyanocobalamin 1,000 mcg tablet; Commonly known as: Vitamin B-12   Flonase Allergy  Relief 50 mcg/actuation nasal spray; Generic drug:   fluticasone   lisinopril 2.5 mg tablet; Take 1 tablet (2.5 mg) by mouth once daily.   magic mouthwash (lidocaine, diphenhydrAMINE, Maalox 1:1:1); Swish and   spit 10 mL every 6 hours if needed for mucositis.   mecobalam-folate-B6-B2-betain 1,000 mcg-3,400 mcg DFE-10 mg capsule   NON FORMULARY   ondansetron 8 mg tablet; Commonly known as: Zofran; Take 1 tablet (8 mg)   by mouth every 8 hours if needed for nausea or vomiting.   oxyCODONE 5 mg immediate release tablet; Commonly known as: Roxicodone;   Take 1 tablet (5 mg) by mouth every 6 hours if needed (pain).   pantoprazole 20 mg EC tablet; Commonly known as: Protonix; Take 1 tablet   (20 mg) by mouth once daily in the morning. Take before meals. Do not   crush, chew, or split.   prochlorperazine 10 mg tablet; Commonly known as: Compazine; Take 1   tablet (10 mg) by mouth every 6 hours if needed for nausea or vomiting.   rosuvastatin 5 mg tablet; Commonly known as: Crestor; Take 1 tablet (5   mg) by mouth once daily.   sildenafil 25 mg tablet; Commonly known as: Viagra   thiamine 100 mg tablet; Commonly known as: Vitamin B-1   topiramate 25 mg tablet; Commonly known as: Topamax; Take 0.5 tablets   (12.5 mg) by mouth once daily.   VITAMIN B-6 ORAL       Outpatient Follow-Up  Future Appointments   Date Time Provider Department Center   6/6/2024  1:30 PM Rakesh Alexander MD [id number] [place]   6/7/2024  9:00 AM INF 06 PARMA [id number] [place]   7/1/2024  9:45 AM PAR CT 1 PARCT [facility]   7/17/2024 10:00 AM PAR MAC 3 ECHO ROOM 1 [id number] [facility]   7/23/2024  2:00 PM Rossi Chou MD [id number] [place]       Luci Barnhart, APRN-CNP

## 2024-05-27 NOTE — PROGRESS NOTES
General Surgery Attending Note    My Acute Care Surgery TIARA (Advanced Practice Provider) evaluated this patient today.  Please see that documentation for details.    I saw the patient with the TIARA today, and personally evaluated and examined the patient.  My findings are consistent with those of the TIARA.        Impression:      Patient feels better and back to baseline  He started passing flatus last evening and is continued today   He had a very large bowel movement yesterday  Clear liquid diet started last evening and tolerating well    Afebrile with normal vital signs  Abdomen is soft and benign with active bowel sounds; small incarcerated incisional hernia left lower quadrant containing fat; small reducible right inguinal hernia    White blood cell count 3.5; H&H mildly diminished    Impression is transient partial adhesive small bowel obstruction versus ileus-resolved    Plan:      Advance diet  Discharged home today  Follow-up with general surgery as needed for recurrent abdominal pain  Also discussed following up with general surgery regarding repair of the incisional and inguinal hernias at some point

## 2024-05-27 NOTE — CARE PLAN
The clinical goals for the shift include remain free from pain and discomfort    Pt up walking unit. Had formed BM this evening. No complaints of N/V or pain.

## 2024-05-28 ENCOUNTER — PATIENT OUTREACH (OUTPATIENT)
Dept: PRIMARY CARE | Facility: CLINIC | Age: 74
End: 2024-05-28
Payer: MEDICARE

## 2024-05-28 NOTE — PROGRESS NOTES
Discharge Facility: [facility]  Discharge Diagnosis: Small bowel obstruction  Admission Date: 5/26/24  Discharge Date: 5/27/24    PCP Appointment Date: No avail apt. Task to office  Specialist Appointment Date: unknown  Hospital Encounter and Summary: Linked     See discharge assessment below for further details    Engagement  Call Start Time: 1201 (5/28/2024 12:05 PM)    Medications  Medications reviewed with patient/caregiver?: Not applicable (5/28/2024 12:05 PM)  Does the patient have all medications ordered at discharge?: Not applicable (5/28/2024 12:05 PM)  Care Management Interventions: No intervention needed (5/28/2024 12:05 PM)  Prescription Comments: N/A (5/28/2024 12:05 PM)  Is the patient taking all medications as directed (includes completed medication regime)?: Not applicable (5/28/2024 12:05 PM)  Medication Comments: N/A (5/28/2024 12:05 PM)    Appointments  Does the patient have a primary care provider?: Yes (No avail apt. Task to office) (5/28/2024 12:05 PM)  Has the patient kept scheduled appointments due by today?: Yes (5/28/2024 12:05 PM)  Care Management Interventions: Educated on importance of keeping appointment (5/28/2024 12:05 PM)    Self Management  What is the home health agency?: n/a (5/28/2024 12:05 PM)  Has home health visited the patient within 72 hours of discharge?: Not applicable (5/28/2024 12:05 PM)    Patient Teaching  Does the patient have access to their discharge instructions?: Yes (5/28/2024 12:05 PM)  Care Management Interventions: Reviewed instructions with patient (5/28/2024 12:05 PM)  What is the patient's perception of their health status since discharge?: Improving (5/28/2024 12:05 PM)  Is the patient/caregiver able to teach back the hierarchy of who to call/visit for symptoms/problems? PCP, Specialist, Home Health nurse, Urgent Care, ED, 911: Yes (5/28/2024 12:05 PM)    Wrap Up  Wrap Up Additional Comments: This CM spoke with pt via phone. Pt reports doing well at  home since discharge. New meds reviewed. Pt denies CP and SOB. Pt aware of my availability for non-emergent concerns. Contact info provided to patient (5/28/2024 12:05 PM)      Jabier Sosa LPN

## 2024-06-05 ENCOUNTER — LAB (OUTPATIENT)
Dept: LAB | Facility: CLINIC | Age: 74
End: 2024-06-05
Payer: MEDICARE

## 2024-06-05 DIAGNOSIS — C34.91 ADENOCARCINOMA OF RIGHT LUNG (MULTI): ICD-10-CM

## 2024-06-05 LAB
ALBUMIN SERPL BCP-MCNC: 3.9 G/DL (ref 3.4–5)
ALP SERPL-CCNC: 69 U/L (ref 33–136)
ALT SERPL W P-5'-P-CCNC: 20 U/L (ref 10–52)
ANION GAP SERPL CALC-SCNC: 13 MMOL/L (ref 10–20)
AST SERPL W P-5'-P-CCNC: 10 U/L (ref 9–39)
BASOPHILS # BLD AUTO: 0.02 X10*3/UL (ref 0–0.1)
BASOPHILS NFR BLD AUTO: 0.4 %
BILIRUB SERPL-MCNC: 0.8 MG/DL (ref 0–1.2)
BUN SERPL-MCNC: 11 MG/DL (ref 6–23)
CALCIUM SERPL-MCNC: 9.1 MG/DL (ref 8.6–10.3)
CHLORIDE SERPL-SCNC: 106 MMOL/L (ref 98–107)
CO2 SERPL-SCNC: 23 MMOL/L (ref 21–32)
CREAT SERPL-MCNC: 1.02 MG/DL (ref 0.5–1.3)
EGFRCR SERPLBLD CKD-EPI 2021: 77 ML/MIN/1.73M*2
EOSINOPHIL # BLD AUTO: 0.05 X10*3/UL (ref 0–0.4)
EOSINOPHIL NFR BLD AUTO: 0.9 %
ERYTHROCYTE [DISTWIDTH] IN BLOOD BY AUTOMATED COUNT: 16.7 % (ref 11.5–14.5)
GLUCOSE SERPL-MCNC: 94 MG/DL (ref 74–99)
HCT VFR BLD AUTO: 42.1 % (ref 41–52)
HGB BLD-MCNC: 13.9 G/DL (ref 13.5–17.5)
IMM GRANULOCYTES # BLD AUTO: 0.01 X10*3/UL (ref 0–0.5)
IMM GRANULOCYTES NFR BLD AUTO: 0.2 % (ref 0–0.9)
LYMPHOCYTES # BLD AUTO: 1.25 X10*3/UL (ref 0.8–3)
LYMPHOCYTES NFR BLD AUTO: 23.7 %
MCH RBC QN AUTO: 29.6 PG (ref 26–34)
MCHC RBC AUTO-ENTMCNC: 33 G/DL (ref 32–36)
MCV RBC AUTO: 90 FL (ref 80–100)
MONOCYTES # BLD AUTO: 0.81 X10*3/UL (ref 0.05–0.8)
MONOCYTES NFR BLD AUTO: 15.4 %
NEUTROPHILS # BLD AUTO: 3.13 X10*3/UL (ref 1.6–5.5)
NEUTROPHILS NFR BLD AUTO: 59.4 %
NRBC BLD-RTO: 0 /100 WBCS (ref 0–0)
PLATELET # BLD AUTO: 235 X10*3/UL (ref 150–450)
POTASSIUM SERPL-SCNC: 4.5 MMOL/L (ref 3.5–5.3)
PROT SERPL-MCNC: 6.7 G/DL (ref 6.4–8.2)
RBC # BLD AUTO: 4.7 X10*6/UL (ref 4.5–5.9)
SODIUM SERPL-SCNC: 137 MMOL/L (ref 136–145)
WBC # BLD AUTO: 5.3 X10*3/UL (ref 4.4–11.3)

## 2024-06-05 PROCEDURE — 85025 COMPLETE CBC W/AUTO DIFF WBC: CPT | Performed by: INTERNAL MEDICINE

## 2024-06-05 PROCEDURE — 36415 COLL VENOUS BLD VENIPUNCTURE: CPT

## 2024-06-05 PROCEDURE — 80053 COMPREHEN METABOLIC PANEL: CPT | Performed by: INTERNAL MEDICINE

## 2024-06-06 ENCOUNTER — APPOINTMENT (OUTPATIENT)
Dept: HEMATOLOGY/ONCOLOGY | Facility: CLINIC | Age: 74
End: 2024-06-06
Payer: MEDICARE

## 2024-06-06 ENCOUNTER — OFFICE VISIT (OUTPATIENT)
Dept: PRIMARY CARE | Facility: CLINIC | Age: 74
End: 2024-06-06
Payer: MEDICARE

## 2024-06-06 VITALS
HEART RATE: 89 BPM | HEIGHT: 75 IN | WEIGHT: 230 LBS | DIASTOLIC BLOOD PRESSURE: 80 MMHG | SYSTOLIC BLOOD PRESSURE: 166 MMHG | BODY MASS INDEX: 28.6 KG/M2 | OXYGEN SATURATION: 97 %

## 2024-06-06 DIAGNOSIS — J42 CHRONIC BRONCHITIS, UNSPECIFIED CHRONIC BRONCHITIS TYPE (MULTI): ICD-10-CM

## 2024-06-06 PROCEDURE — 1157F ADVNC CARE PLAN IN RCRD: CPT | Performed by: FAMILY MEDICINE

## 2024-06-06 PROCEDURE — 3079F DIAST BP 80-89 MM HG: CPT | Performed by: FAMILY MEDICINE

## 2024-06-06 PROCEDURE — 3077F SYST BP >= 140 MM HG: CPT | Performed by: FAMILY MEDICINE

## 2024-06-06 PROCEDURE — 1111F DSCHRG MED/CURRENT MED MERGE: CPT | Performed by: FAMILY MEDICINE

## 2024-06-06 PROCEDURE — 1036F TOBACCO NON-USER: CPT | Performed by: FAMILY MEDICINE

## 2024-06-06 PROCEDURE — 99495 TRANSJ CARE MGMT MOD F2F 14D: CPT | Performed by: FAMILY MEDICINE

## 2024-06-06 ASSESSMENT — PATIENT HEALTH QUESTIONNAIRE - PHQ9
1. LITTLE INTEREST OR PLEASURE IN DOING THINGS: NOT AT ALL
SUM OF ALL RESPONSES TO PHQ9 QUESTIONS 1 AND 2: 0
2. FEELING DOWN, DEPRESSED OR HOPELESS: NOT AT ALL

## 2024-06-06 ASSESSMENT — ENCOUNTER SYMPTOMS
MUSCULOSKELETAL NEGATIVE: 1
CARDIOVASCULAR NEGATIVE: 1
RESPIRATORY NEGATIVE: 1
GASTROINTESTINAL NEGATIVE: 1
CONSTITUTIONAL NEGATIVE: 1

## 2024-06-06 NOTE — PROGRESS NOTES
Subjective   Patient ID: Tao Renee is a 74 y.o. male who presents for Hospital Follow-up.  HPI  Sp hosp having some co malaise   Review of Systems   Constitutional: Negative.    HENT: Negative.     Respiratory: Negative.     Cardiovascular: Negative.    Gastrointestinal: Negative.    Genitourinary: Negative.    Musculoskeletal: Negative.        Objective   Physical Exam  Constitutional:       Appearance: Normal appearance.   HENT:      Head: Normocephalic and atraumatic.      Mouth/Throat:      Mouth: Mucous membranes are moist.   Eyes:      Extraocular Movements: Extraocular movements intact.      Pupils: Pupils are equal, round, and reactive to light.   Cardiovascular:      Rate and Rhythm: Normal rate and regular rhythm.   Pulmonary:      Effort: Pulmonary effort is normal.      Breath sounds: Normal breath sounds.   Musculoskeletal:         General: Normal range of motion.   Neurological:      Mental Status: He is alert.         Assessment/Plan   Problem List Items Addressed This Visit    None  Visit Diagnoses         Codes    Chronic bronchitis, unspecified chronic bronchitis type (Multi)     J42                 Rubio Tariq DO 06/06/24 3:41 PM

## 2024-06-07 ENCOUNTER — APPOINTMENT (OUTPATIENT)
Dept: HEMATOLOGY/ONCOLOGY | Facility: CLINIC | Age: 74
End: 2024-06-07
Payer: MEDICARE

## 2024-06-11 ENCOUNTER — PATIENT OUTREACH (OUTPATIENT)
Dept: PRIMARY CARE | Facility: CLINIC | Age: 74
End: 2024-06-11
Payer: MEDICARE

## 2024-06-11 NOTE — PROGRESS NOTES
Unable to reach patient for call back after patient's follow up appointment with PCP.   LVM with call back number for patient to call if needed   If no voicemail available call attempts x 2 were made to contact the patient to assist with any questions or concerns patient may have.    Jabier Sosa LPN

## 2024-06-19 DIAGNOSIS — K21.9 GASTROESOPHAGEAL REFLUX DISEASE WITHOUT ESOPHAGITIS: ICD-10-CM

## 2024-06-19 NOTE — TELEPHONE ENCOUNTER
Rx Refill Request Telephone Encounter    Name:  Tao Renee  :  [id number]  Medication Name: Pantoprazole 20 MG        #90 Refill:1  Take 1 tablet daily in the morning  Specific Pharmacy location[facility] [place]  Date of last appointment: 24  Date of next appointment:   Best number to reach patient:

## 2024-06-20 RX ORDER — PANTOPRAZOLE SODIUM 20 MG/1
20 TABLET, DELAYED RELEASE ORAL
Qty: 30 TABLET | Refills: 11 | Status: SHIPPED | OUTPATIENT
Start: 2024-06-20 | End: 2025-06-20

## 2024-06-27 ENCOUNTER — PATIENT OUTREACH (OUTPATIENT)
Dept: PRIMARY CARE | Facility: CLINIC | Age: 74
End: 2024-06-27
Payer: MEDICARE

## 2024-06-27 NOTE — PROGRESS NOTES
Unable to reach patient for discharge follow up call.   LVM with call back number for patient to call if needed   If no voicemail available call attempts x 2 were made to contact the patient to assist with any questions or concerns patient may have.    Jabier Sosa LPN

## 2024-07-01 ENCOUNTER — HOSPITAL ENCOUNTER (OUTPATIENT)
Dept: RADIOLOGY | Facility: HOSPITAL | Age: 74
Discharge: HOME | End: 2024-07-01
Payer: MEDICARE

## 2024-07-01 DIAGNOSIS — I10 PRIMARY HYPERTENSION: Chronic | ICD-10-CM

## 2024-07-01 DIAGNOSIS — I25.10 ARTERIOSCLEROSIS OF CORONARY ARTERY: Chronic | ICD-10-CM

## 2024-07-01 PROCEDURE — 2550000001 HC RX 255 CONTRASTS: Performed by: INTERNAL MEDICINE

## 2024-07-01 PROCEDURE — 71275 CT ANGIOGRAPHY CHEST: CPT

## 2024-07-01 PROCEDURE — 71275 CT ANGIOGRAPHY CHEST: CPT | Performed by: RADIOLOGY

## 2024-07-02 ENCOUNTER — APPOINTMENT (OUTPATIENT)
Dept: PRIMARY CARE | Facility: CLINIC | Age: 74
End: 2024-07-02
Payer: MEDICARE

## 2024-07-02 DIAGNOSIS — E53.9 DEFICIENCY OF VITAMIN B: Primary | ICD-10-CM

## 2024-07-11 RX ORDER — CYANOCOBALAMIN 1000 UG/ML
1000 INJECTION, SOLUTION INTRAMUSCULAR; SUBCUTANEOUS ONCE
Status: SHIPPED | OUTPATIENT
Start: 2024-07-11

## 2024-07-16 ENCOUNTER — LAB (OUTPATIENT)
Dept: LAB | Facility: LAB | Age: 74
End: 2024-07-16
Payer: MEDICARE

## 2024-07-16 DIAGNOSIS — I25.10 ARTERIOSCLEROSIS OF CORONARY ARTERY: Chronic | ICD-10-CM

## 2024-07-16 LAB
ALBUMIN SERPL BCP-MCNC: 3.9 G/DL (ref 3.4–5)
ALP SERPL-CCNC: 69 U/L (ref 33–136)
ALT SERPL W P-5'-P-CCNC: 13 U/L (ref 10–52)
ANION GAP SERPL CALC-SCNC: 10 MMOL/L (ref 10–20)
AST SERPL W P-5'-P-CCNC: 9 U/L (ref 9–39)
BILIRUB SERPL-MCNC: 0.9 MG/DL (ref 0–1.2)
BUN SERPL-MCNC: 12 MG/DL (ref 6–23)
CALCIUM SERPL-MCNC: 9.1 MG/DL (ref 8.6–10.3)
CHLORIDE SERPL-SCNC: 108 MMOL/L (ref 98–107)
CHOLEST SERPL-MCNC: 192 MG/DL (ref 0–199)
CHOLESTEROL/HDL RATIO: 3.7
CO2 SERPL-SCNC: 26 MMOL/L (ref 21–32)
CREAT SERPL-MCNC: 1.07 MG/DL (ref 0.5–1.3)
EGFRCR SERPLBLD CKD-EPI 2021: 72 ML/MIN/1.73M*2
GLUCOSE SERPL-MCNC: 103 MG/DL (ref 74–99)
HDLC SERPL-MCNC: 52.3 MG/DL
LDLC SERPL CALC-MCNC: 118 MG/DL
NON HDL CHOLESTEROL: 140 MG/DL (ref 0–149)
POTASSIUM SERPL-SCNC: 5.3 MMOL/L (ref 3.5–5.3)
PROT SERPL-MCNC: 6.6 G/DL (ref 6.4–8.2)
SODIUM SERPL-SCNC: 139 MMOL/L (ref 136–145)
TRIGL SERPL-MCNC: 111 MG/DL (ref 0–149)
VLDL: 22 MG/DL (ref 0–40)

## 2024-07-16 PROCEDURE — 36415 COLL VENOUS BLD VENIPUNCTURE: CPT

## 2024-07-16 PROCEDURE — 80053 COMPREHEN METABOLIC PANEL: CPT

## 2024-07-16 PROCEDURE — 80061 LIPID PANEL: CPT

## 2024-07-17 ENCOUNTER — HOSPITAL ENCOUNTER (OUTPATIENT)
Dept: CARDIOLOGY | Facility: CLINIC | Age: 74
Discharge: HOME | End: 2024-07-17
Payer: MEDICARE

## 2024-07-17 DIAGNOSIS — I10 PRIMARY HYPERTENSION: Chronic | ICD-10-CM

## 2024-07-17 DIAGNOSIS — I25.10 ARTERIOSCLEROSIS OF CORONARY ARTERY: Chronic | ICD-10-CM

## 2024-07-17 PROCEDURE — 93306 TTE W/DOPPLER COMPLETE: CPT

## 2024-07-17 PROCEDURE — 93356 MYOCRD STRAIN IMG SPCKL TRCK: CPT | Performed by: INTERNAL MEDICINE

## 2024-07-17 PROCEDURE — 93306 TTE W/DOPPLER COMPLETE: CPT | Performed by: INTERNAL MEDICINE

## 2024-07-18 LAB
AORTIC VALVE MEAN GRADIENT: 2.9 MMHG
AORTIC VALVE PEAK VELOCITY: 1.23 M/S
AV PEAK GRADIENT: 6.1 MMHG
AVA (PEAK VEL): 3.13 CM2
AVA (VTI): 3.53 CM2
EJECTION FRACTION APICAL 4 CHAMBER: 62.3
EJECTION FRACTION: 60 %
GLOBAL LONGITUDINAL STRAIN: 20.1 %
LEFT VENTRICLE INTERNAL DIMENSION DIASTOLE: 4.42 CM (ref 3.5–6)
LEFT VENTRICULAR OUTFLOW TRACT DIAMETER: 2.29 CM
RIGHT VENTRICLE FREE WALL PEAK S': 0.12 CM/S
TRICUSPID ANNULAR PLANE SYSTOLIC EXCURSION: 1.5 CM

## 2024-07-22 PROBLEM — J40 BRONCHITIS: Status: RESOLVED | Noted: 2023-12-29 | Resolved: 2024-07-22

## 2024-07-22 PROBLEM — Z09 HOSPITAL DISCHARGE FOLLOW-UP: Status: ACTIVE | Noted: 2024-07-22

## 2024-07-22 PROBLEM — I71.21 ANEURYSM OF ASCENDING AORTA WITHOUT RUPTURE (CMS-HCC): Chronic | Status: RESOLVED | Noted: 2023-09-06 | Resolved: 2024-07-22

## 2024-07-22 PROBLEM — G47.33 OBSTRUCTIVE SLEEP APNEA SYNDROME: Chronic | Status: ACTIVE | Noted: 2024-01-22

## 2024-07-22 NOTE — PROGRESS NOTES
Referred by No ref. provider found    CHRIS Murphy is here for follow up. Admitted to hosp for a SBO. He stopped his ASA due to hematuris    Past Medical History:  Problem List Items Addressed This Visit    None     Past Medical History:   Diagnosis Date    Adenocarcinoma of right lung (Multi) 09/06/2023    Aneurysm of ascending aorta without rupture (CMS-HCC) 09/06/2023    Arteriosclerosis of coronary artery 09/06/2023    Elevated calcium score 18 Agatston units.(25th percentile)    Arthritis     BPH (benign prostatic hyperplasia)     GERD (gastroesophageal reflux disease)     H/O echocardiogram     Last Echo in 2018    HL (hearing loss)     HTN (hypertension) 09/06/2023    Hyperlipidemia 09/06/2023    Dr. Chou follows    Lung cancer (Multi)     recurrent lung cancer    Shortness of breath     Sleep apnea     Spondyloarthropathy 09/06/2023    Strain of lumbar region 02/02/2024    Thoracic aortic aneurysm (TAA) (CMS-HCC) 09/06/2023    3.8 - 4 cm         Past Surgical History:  He has a past surgical history that includes Colonoscopy (10/14/2015); Small intestine surgery (10/14/2015); Other surgical history (06/22/2016); CT guided percutaneous biopsy lung (05/04/2016); CT guided percutaneous biopsy lung (10/23/2019); CT angio abdomen pelvis w and or wo IV IV contrast (01/05/2020); CT guided percutaneous biopsy lung (02/27/2020); Lung lobectomy (Right); and Bronchoscopy.      Social History:  He reports that he quit smoking about 23 years ago. His smoking use included cigarettes. He started smoking about 57 years ago. He has a 68 pack-year smoking history. He has never used smokeless tobacco. He reports current alcohol use of about 6.0 standard drinks of alcohol per week. He reports that he does not use drugs.    Family History:  Family History   Problem Relation Name Age of Onset    Hypertension Mother      Coronary artery disease Mother      Hypertension Father      Coronary artery disease Father      Heart attack  Father       Allergies:  Atorvastatin and Azithromycin    Outpatient Medications:  Current Outpatient Medications   Medication Instructions    ALPRAZolam (XANAX) 0.5 mg, oral, Nightly PRN    aspirin 81 mg, oral, Daily    cyanocobalamin (VITAMIN B-12) 1,000 mcg, oral, Daily    fluticasone (Flonase Allergy Relief) 50 mcg/actuation nasal spray 1 spray, Each Nostril, Daily PRN    lisinopril 2.5 mg, oral, Daily    magic mouthwash (lidocaine, diphenhydrAMINE, Maalox 1:1:1) 10 mL, Swish & Spit, Every 6 hours PRN    mecobalam-folate-B6-B2-betain 1,000 mcg-3,400 mcg DFE-10 mg capsule 1 capsule, oral, Daily    NON FORMULARY Organic bitter apricot kernels 3-4 a day     ondansetron (ZOFRAN) 8 mg, oral, Every 8 hours PRN    oxyCODONE (ROXICODONE) 5 mg, oral, Every 6 hours PRN    pantoprazole (PROTONIX) 20 mg, oral, Daily before breakfast, Do not crush, chew, or split.    prochlorperazine (COMPAZINE) 10 mg, oral, Every 6 hours PRN    pyridoxine HCl, vitamin B6, (VITAMIN B-6 ORAL) 100 mg, oral, Daily    rosuvastatin (CRESTOR) 5 mg, oral, Daily    sildenafil (VIAGRA) 25 mg, oral, 1 hour before needed    thiamine (VITAMIN B-1) 100 mg, oral, Daily    topiramate (TOPAMAX) 12.5 mg, oral, Daily     Last Recorded Vitals:  There were no vitals filed for this visit.    Physical Exam  Patient is alert and oriented x3.  HEENT is unremarkable mucous members are moist  Neck no JVP no bruits upstrokes are full no thyromegaly  Lungs are clear bilaterally.  No wheezing crackles or rales  Heart regular rhythm normal S1-S2 there is no S3 no murmurs are heard.  Abdomen is soft bs are positive nontender nondistended no organomegaly no pulsatile masses  Extremities have no edema.  Distal pulses present palpable.  Neuro is grossly nonfocal  Skin has no rashes     Last Labs:  CBC -  Lab Results   Component Value Date    WBC 5.3 06/05/2024    HGB 13.9 06/05/2024    HCT 42.1 06/05/2024    MCV 90 06/05/2024     06/05/2024     CMP -  Lab Results    Component Value Date    CALCIUM 9.1 07/16/2024    PHOS 3.2 01/28/2024    PROT 6.6 07/16/2024    ALBUMIN 3.9 07/16/2024    AST 9 07/16/2024    ALT 13 07/16/2024    ALKPHOS 69 07/16/2024    BILITOT 0.9 07/16/2024     LIPID PANEL -   Lab Results   Component Value Date    CHOL 192 07/16/2024    HDL 52.3 07/16/2024    CHHDL 3.7 07/16/2024    VLDL 22 07/16/2024    TRIG 111 07/16/2024    NHDL 140 07/16/2024     RENAL FUNCTION PANEL -   Lab Results   Component Value Date    K 5.3 07/16/2024    PHOS 3.2 01/28/2024     Lab Results   Component Value Date    HGBA1C 5.6 01/09/2024     Procedure    Oncologic echo 7/17/2024 EF 60%, DDF, strain -20.1%    CTA chest 7/1/2024 no thoracic aortic aneurysm is noted right pleural effusion    Echo 1/16/2024 EF 60 to 65%, DDF, thoracic aorta 4.5 cm    CT [11/2/2020]; Appearance of chest is similar to prior exam. Marginal increase in R suprahilar density / bronchiectasis. Although prob fibrotic, underlying neoplastic cause cannot be excluded.     PET LUNG [3/5/2020]: Hypermetabolic RUL pulm nodule c/w known malignancy recurrence. No ev/of hypermetabolic local or distant mets.     PFT [3/16/2020]: Mild COPD w/mildly reduced DLCO      EX NST [12/3/2019]: 6 min 17 sec (7.40 METs) . . . Normal - ev/for diaphragmatic attenuation artifact. Normal segmental function on gated images with exercise EF 80%.      CT CHEST w/o contrast [10/16/19]: A 1.1 x 1.3 cm mixed ground-glass and solid RUL nodular opacity which appears stable in size but demonstrates an increased solid component compared to prior exam. Percutaneous bx recommended. Multiple addl bilat nodular opacities, stable. S/P right middle lobectomy w/o ev/of local tumor recurrence. Bilat upper lobe predominant emphysematous changes. Mod athero calcifications of thoracic aorta and coronary arteries. Multiple hepatic cysts, stable.      ECHO [11/21//2018]: LVSF normal, EF 60-65%, SD = impaired relax.     CT CHEST (7/18/2018) Interval stability  of pulm nodules and area of R-upper lung ground glass attenuation. Mild centrilobular emphysema.     PVR (3/27/2018) Mild femoral popliteal occlusive disease, bi-lat lower extremities.      Chest CTA Triple R/O Â [04/07/2016]: Approx 1.8cm irreg nodular density in RML slightly inc from 1.4cm previously.     Carotid Duplex Â [07/06/2015]: 16-49% stenosis NAVEEN/LICA, nrml flow.     Calcium Score Â [02/19/2010]: Score of 0 Agatston units, multipile low-density lesions in liver otherwise negative study.        Assessment/Plan   1. CAD. Mildly elevated calcium score 15 Agatston units placing him in the 25th percentile.His stress test in 11/2019 was NL.  I have asked for him to restart his baby aspirin.  He is off his simvastatin.  I am asking him to try rosuvastatin 5 mg.  He has been very reluctant to try statins.     2. Hyperlipidemia.  Off statins.  11/29/2023  HDL 50 triglycerides 88.  He was intolerant to simvastatin.  With some urging he is willing to try rosuvastatin 5 mg.  7/16/2024  HDL 52 triglycerides 111. Will increase rosuvastatin to 10 mg. FBW in 6 months    3. Thoracic aortic aneurysm measuring 3.85 cm.  Echo 1/20/2024 TAA 4.5 cm a formal we did repeat CTA of the chest in July along with an echo at the same time.  No aneurysm was seen.  No follow-up is needed.    4. Right middle lobe lung cancer status post lobectomy..  This was in 2018.  Had a recurrence of adenocarcinoma and underwent a resection of the right upper lobe January 2024.  Getting adjuvant chemotherapy at this time. He did 3 out of 4 rounds of chemo. He declined the last round. Because of hematuria    5. Hypertension.  Blood pressures are well-controlled    6.  Chemotherapy.  His oncologic echo reveals a normal strain index of -20%.  No thoracic aortic aneurysm is noted.    RTC 6 month. FBW in 6 months    Rossi Chou MD     Instructions and follow up

## 2024-07-23 ENCOUNTER — APPOINTMENT (OUTPATIENT)
Dept: CARDIOLOGY | Facility: CLINIC | Age: 74
End: 2024-07-23
Payer: MEDICARE

## 2024-07-23 VITALS
BODY MASS INDEX: 29.59 KG/M2 | OXYGEN SATURATION: 98 % | WEIGHT: 238 LBS | SYSTOLIC BLOOD PRESSURE: 140 MMHG | HEART RATE: 83 BPM | HEIGHT: 75 IN | DIASTOLIC BLOOD PRESSURE: 90 MMHG

## 2024-07-23 DIAGNOSIS — I25.10 ARTERIOSCLEROSIS OF CORONARY ARTERY: Primary | Chronic | ICD-10-CM

## 2024-07-23 DIAGNOSIS — I10 PRIMARY HYPERTENSION: Chronic | ICD-10-CM

## 2024-07-23 DIAGNOSIS — E78.2 MIXED HYPERLIPIDEMIA: Chronic | ICD-10-CM

## 2024-07-23 DIAGNOSIS — Z09 HOSPITAL DISCHARGE FOLLOW-UP: ICD-10-CM

## 2024-07-23 PROCEDURE — 1157F ADVNC CARE PLAN IN RCRD: CPT | Performed by: INTERNAL MEDICINE

## 2024-07-23 PROCEDURE — 1159F MED LIST DOCD IN RCRD: CPT | Performed by: INTERNAL MEDICINE

## 2024-07-23 PROCEDURE — 99214 OFFICE O/P EST MOD 30 MIN: CPT | Performed by: INTERNAL MEDICINE

## 2024-07-23 PROCEDURE — 3080F DIAST BP >= 90 MM HG: CPT | Performed by: INTERNAL MEDICINE

## 2024-07-23 PROCEDURE — 3077F SYST BP >= 140 MM HG: CPT | Performed by: INTERNAL MEDICINE

## 2024-07-23 PROCEDURE — 1160F RVW MEDS BY RX/DR IN RCRD: CPT | Performed by: INTERNAL MEDICINE

## 2024-07-23 PROCEDURE — 1036F TOBACCO NON-USER: CPT | Performed by: INTERNAL MEDICINE

## 2024-07-23 RX ORDER — ROSUVASTATIN CALCIUM 10 MG/1
10 TABLET, COATED ORAL DAILY
Qty: 30 TABLET | Refills: 11 | Status: SHIPPED | OUTPATIENT
Start: 2024-07-23 | End: 2025-07-23

## 2024-07-23 NOTE — PATIENT INSTRUCTIONS
1. CAD. Mildly elevated calcium score 15 Agatston units placing him in the 25th percentile.His stress test in 11/2019 was NL.  I have asked for him to restart his baby aspirin.  He is off his simvastatin.  I am asking him to try rosuvastatin 5 mg.  He has been very reluctant to try statins.     2. Hyperlipidemia.  Off statins.  11/29/2023  HDL 50 triglycerides 88.  He was intolerant to simvastatin.  With some urging he is willing to try rosuvastatin 5 mg.  7/16/2024  HDL 52 triglycerides 111. Will increase rosuvastatin to 10 mg. FBW in 6 months    3. Thoracic aortic aneurysm measuring 3.85 cm.  Echo 1/20/2024 TAA 4.5 cm a formal we did repeat CTA of the chest in July along with an echo at the same time.  No aneurysm was seen.  No follow-up is needed.    4. Right middle lobe lung cancer status post lobectomy..  This was in 2018.  Had a recurrence of adenocarcinoma and underwent a resection of the right upper lobe January 2024.  Getting adjuvant chemotherapy at this time. He did 3 out of 4 rounds of chemo. He declined the last round. Because of hematuria    5. Hypertension.  Blood pressures are well-controlled    6.  Chemotherapy.  His oncologic echo reveals a normal strain index of -20%.  No thoracic aortic aneurysm is noted.    RTC 6 month. FBW in 6 months

## 2024-08-02 ENCOUNTER — APPOINTMENT (OUTPATIENT)
Dept: PRIMARY CARE | Facility: CLINIC | Age: 74
End: 2024-08-02
Payer: MEDICARE

## 2024-08-02 DIAGNOSIS — E53.9 DEFICIENCY OF VITAMIN B: Primary | ICD-10-CM

## 2024-08-02 DIAGNOSIS — F41.9 ANXIETY: ICD-10-CM

## 2024-08-02 DIAGNOSIS — I10 HYPERTENSION, UNSPECIFIED TYPE: ICD-10-CM

## 2024-08-02 PROCEDURE — 96372 THER/PROPH/DIAG INJ SC/IM: CPT | Performed by: FAMILY MEDICINE

## 2024-08-02 RX ORDER — CYANOCOBALAMIN 1000 UG/ML
1000 INJECTION, SOLUTION INTRAMUSCULAR; SUBCUTANEOUS ONCE
Status: COMPLETED | OUTPATIENT
Start: 2024-08-02 | End: 2024-08-02

## 2024-08-02 RX ORDER — ALPRAZOLAM 0.5 MG/1
0.5 TABLET ORAL NIGHTLY PRN
Qty: 45 TABLET | Refills: 1 | Status: SHIPPED | OUTPATIENT
Start: 2024-08-02

## 2024-08-23 ENCOUNTER — PATIENT OUTREACH (OUTPATIENT)
Dept: PRIMARY CARE | Facility: CLINIC | Age: 74
End: 2024-08-23
Payer: MEDICARE

## 2024-08-23 NOTE — PROGRESS NOTES
Patient has met target of no readmission for (90) days post (hospital, SNF, rehab) discharge and is graduated from Transitional Care Management program at this time.    Jabier Sosa LPN

## 2024-08-26 DIAGNOSIS — C34.91 ADENOCARCINOMA OF RIGHT LUNG (MULTI): Primary | Chronic | ICD-10-CM

## 2024-08-31 ENCOUNTER — HOSPITAL ENCOUNTER (OUTPATIENT)
Facility: HOSPITAL | Age: 74
Setting detail: OBSERVATION
End: 2024-08-31
Attending: EMERGENCY MEDICINE | Admitting: INTERNAL MEDICINE
Payer: MEDICARE

## 2024-08-31 ENCOUNTER — APPOINTMENT (OUTPATIENT)
Dept: RADIOLOGY | Facility: HOSPITAL | Age: 74
End: 2024-08-31
Payer: MEDICARE

## 2024-08-31 DIAGNOSIS — K62.5 RECTAL BLEEDING: Primary | ICD-10-CM

## 2024-08-31 DIAGNOSIS — K92.1 MELENA: ICD-10-CM

## 2024-08-31 LAB
ABO GROUP (TYPE) IN BLOOD: NORMAL
ALBUMIN SERPL BCP-MCNC: 3.9 G/DL (ref 3.4–5)
ALP SERPL-CCNC: 63 U/L (ref 33–136)
ALT SERPL W P-5'-P-CCNC: 14 U/L (ref 10–52)
ANION GAP SERPL CALC-SCNC: 7 MMOL/L (ref 10–20)
ANTIBODY SCREEN: NORMAL
APPEARANCE UR: CLEAR
APTT PPP: 32 SECONDS (ref 27–38)
AST SERPL W P-5'-P-CCNC: 10 U/L (ref 9–39)
BASOPHILS # BLD AUTO: 0.04 X10*3/UL (ref 0–0.1)
BASOPHILS NFR BLD AUTO: 0.4 %
BILIRUB SERPL-MCNC: 0.6 MG/DL (ref 0–1.2)
BILIRUB UR STRIP.AUTO-MCNC: NEGATIVE MG/DL
BUN SERPL-MCNC: 13 MG/DL (ref 6–23)
CALCIUM SERPL-MCNC: 9 MG/DL (ref 8.6–10.3)
CHLORIDE SERPL-SCNC: 107 MMOL/L (ref 98–107)
CO2 SERPL-SCNC: 25 MMOL/L (ref 21–32)
COLOR UR: NORMAL
CREAT SERPL-MCNC: 1 MG/DL (ref 0.5–1.3)
EGFRCR SERPLBLD CKD-EPI 2021: 78 ML/MIN/1.73M*2
EOSINOPHIL # BLD AUTO: 0.12 X10*3/UL (ref 0–0.4)
EOSINOPHIL NFR BLD AUTO: 1.3 %
ERYTHROCYTE [DISTWIDTH] IN BLOOD BY AUTOMATED COUNT: 12.7 % (ref 11.5–14.5)
GLUCOSE SERPL-MCNC: 88 MG/DL (ref 74–99)
GLUCOSE UR STRIP.AUTO-MCNC: NORMAL MG/DL
HCT VFR BLD AUTO: 46.9 % (ref 41–52)
HEMOCCULT SP1 STL QL: POSITIVE
HGB BLD-MCNC: 15.2 G/DL (ref 13.5–17.5)
IMM GRANULOCYTES # BLD AUTO: 0.03 X10*3/UL (ref 0–0.5)
IMM GRANULOCYTES NFR BLD AUTO: 0.3 % (ref 0–0.9)
INR PPP: 1.1 (ref 0.9–1.1)
KETONES UR STRIP.AUTO-MCNC: NEGATIVE MG/DL
LACTATE SERPL-SCNC: 0.9 MMOL/L (ref 0.4–2)
LEUKOCYTE ESTERASE UR QL STRIP.AUTO: NEGATIVE
LYMPHOCYTES # BLD AUTO: 1.97 X10*3/UL (ref 0.8–3)
LYMPHOCYTES NFR BLD AUTO: 21.8 %
MAGNESIUM SERPL-MCNC: 1.99 MG/DL (ref 1.6–2.4)
MCH RBC QN AUTO: 30.2 PG (ref 26–34)
MCHC RBC AUTO-ENTMCNC: 32.4 G/DL (ref 32–36)
MCV RBC AUTO: 93 FL (ref 80–100)
MONOCYTES # BLD AUTO: 0.84 X10*3/UL (ref 0.05–0.8)
MONOCYTES NFR BLD AUTO: 9.3 %
NEUTROPHILS # BLD AUTO: 6.03 X10*3/UL (ref 1.6–5.5)
NEUTROPHILS NFR BLD AUTO: 66.9 %
NITRITE UR QL STRIP.AUTO: NEGATIVE
NRBC BLD-RTO: 0 /100 WBCS (ref 0–0)
PH UR STRIP.AUTO: 7 [PH]
PLATELET # BLD AUTO: 209 X10*3/UL (ref 150–450)
POTASSIUM SERPL-SCNC: 4.3 MMOL/L (ref 3.5–5.3)
PROT SERPL-MCNC: 7 G/DL (ref 6.4–8.2)
PROT UR STRIP.AUTO-MCNC: NEGATIVE MG/DL
PROTHROMBIN TIME: 11.9 SECONDS (ref 9.8–12.8)
RBC # BLD AUTO: 5.04 X10*6/UL (ref 4.5–5.9)
RBC # UR STRIP.AUTO: NEGATIVE /UL
RH FACTOR (ANTIGEN D): NORMAL
SODIUM SERPL-SCNC: 135 MMOL/L (ref 136–145)
SP GR UR STRIP.AUTO: 1.02
UROBILINOGEN UR STRIP.AUTO-MCNC: NORMAL MG/DL
WBC # BLD AUTO: 9 X10*3/UL (ref 4.4–11.3)

## 2024-08-31 PROCEDURE — G0378 HOSPITAL OBSERVATION PER HR: HCPCS

## 2024-08-31 PROCEDURE — 85610 PROTHROMBIN TIME: CPT | Performed by: NURSE PRACTITIONER

## 2024-08-31 PROCEDURE — 99222 1ST HOSP IP/OBS MODERATE 55: CPT | Performed by: INTERNAL MEDICINE

## 2024-08-31 PROCEDURE — 80053 COMPREHEN METABOLIC PANEL: CPT | Performed by: NURSE PRACTITIONER

## 2024-08-31 PROCEDURE — 2500000004 HC RX 250 GENERAL PHARMACY W/ HCPCS (ALT 636 FOR OP/ED): Performed by: INTERNAL MEDICINE

## 2024-08-31 PROCEDURE — 99285 EMERGENCY DEPT VISIT HI MDM: CPT | Mod: 25

## 2024-08-31 PROCEDURE — 82270 OCCULT BLOOD FECES: CPT | Performed by: NURSE PRACTITIONER

## 2024-08-31 PROCEDURE — 74174 CTA ABD&PLVS W/CONTRAST: CPT | Performed by: RADIOLOGY

## 2024-08-31 PROCEDURE — 36415 COLL VENOUS BLD VENIPUNCTURE: CPT | Performed by: NURSE PRACTITIONER

## 2024-08-31 PROCEDURE — 86901 BLOOD TYPING SEROLOGIC RH(D): CPT | Performed by: NURSE PRACTITIONER

## 2024-08-31 PROCEDURE — 85025 COMPLETE CBC W/AUTO DIFF WBC: CPT | Performed by: NURSE PRACTITIONER

## 2024-08-31 PROCEDURE — 2550000001 HC RX 255 CONTRASTS: Performed by: EMERGENCY MEDICINE

## 2024-08-31 PROCEDURE — 81003 URINALYSIS AUTO W/O SCOPE: CPT | Performed by: NURSE PRACTITIONER

## 2024-08-31 PROCEDURE — 83605 ASSAY OF LACTIC ACID: CPT | Performed by: NURSE PRACTITIONER

## 2024-08-31 PROCEDURE — 74174 CTA ABD&PLVS W/CONTRAST: CPT

## 2024-08-31 PROCEDURE — 83735 ASSAY OF MAGNESIUM: CPT | Performed by: NURSE PRACTITIONER

## 2024-08-31 RX ORDER — ALPRAZOLAM 0.25 MG/1
0.5 TABLET ORAL NIGHTLY PRN
Status: DISCONTINUED | OUTPATIENT
Start: 2024-08-31 | End: 2024-09-03 | Stop reason: HOSPADM

## 2024-08-31 RX ORDER — PANTOPRAZOLE SODIUM 40 MG/10ML
40 INJECTION, POWDER, LYOPHILIZED, FOR SOLUTION INTRAVENOUS 2 TIMES DAILY
Status: DISCONTINUED | OUTPATIENT
Start: 2024-08-31 | End: 2024-09-03 | Stop reason: HOSPADM

## 2024-08-31 RX ORDER — SODIUM CHLORIDE 9 MG/ML
75 INJECTION, SOLUTION INTRAVENOUS CONTINUOUS
Status: DISCONTINUED | OUTPATIENT
Start: 2024-09-01 | End: 2024-09-03

## 2024-08-31 RX ORDER — ONDANSETRON HYDROCHLORIDE 2 MG/ML
4 INJECTION, SOLUTION INTRAVENOUS EVERY 8 HOURS PRN
Status: DISCONTINUED | OUTPATIENT
Start: 2024-08-31 | End: 2024-09-03 | Stop reason: HOSPADM

## 2024-08-31 RX ORDER — LANOLIN ALCOHOL/MO/W.PET/CERES
1000 CREAM (GRAM) TOPICAL DAILY
Status: DISCONTINUED | OUTPATIENT
Start: 2024-08-31 | End: 2024-09-03 | Stop reason: HOSPADM

## 2024-08-31 RX ORDER — ROSUVASTATIN CALCIUM 10 MG/1
10 TABLET, COATED ORAL DAILY
Status: DISCONTINUED | OUTPATIENT
Start: 2024-08-31 | End: 2024-09-03 | Stop reason: HOSPADM

## 2024-08-31 RX ORDER — LANOLIN ALCOHOL/MO/W.PET/CERES
100 CREAM (GRAM) TOPICAL DAILY
Status: DISCONTINUED | OUTPATIENT
Start: 2024-08-31 | End: 2024-09-03 | Stop reason: HOSPADM

## 2024-08-31 RX ORDER — ACETAMINOPHEN 325 MG/1
650 TABLET ORAL EVERY 4 HOURS PRN
Status: DISCONTINUED | OUTPATIENT
Start: 2024-08-31 | End: 2024-09-03 | Stop reason: HOSPADM

## 2024-08-31 RX ORDER — TOPIRAMATE 25 MG/1
12.5 TABLET ORAL DAILY
Status: DISCONTINUED | OUTPATIENT
Start: 2024-08-31 | End: 2024-09-03 | Stop reason: HOSPADM

## 2024-08-31 RX ORDER — FLUTICASONE PROPIONATE 50 MCG
1 SPRAY, SUSPENSION (ML) NASAL DAILY PRN
Status: DISCONTINUED | OUTPATIENT
Start: 2024-08-31 | End: 2024-09-03 | Stop reason: HOSPADM

## 2024-08-31 RX ORDER — LISINOPRIL 5 MG/1
2.5 TABLET ORAL DAILY
Status: DISCONTINUED | OUTPATIENT
Start: 2024-08-31 | End: 2024-09-03 | Stop reason: HOSPADM

## 2024-08-31 RX ADMIN — IOHEXOL 100 ML: 350 INJECTION, SOLUTION INTRAVENOUS at 19:12

## 2024-08-31 RX ADMIN — PANTOPRAZOLE SODIUM 40 MG: 40 INJECTION, POWDER, FOR SOLUTION INTRAVENOUS at 23:00

## 2024-08-31 SDOH — SOCIAL STABILITY: SOCIAL INSECURITY: DO YOU FEEL UNSAFE GOING BACK TO THE PLACE WHERE YOU ARE LIVING?: NO

## 2024-08-31 SDOH — SOCIAL STABILITY: SOCIAL INSECURITY: ARE THERE ANY APPARENT SIGNS OF INJURIES/BEHAVIORS THAT COULD BE RELATED TO ABUSE/NEGLECT?: NO

## 2024-08-31 SDOH — SOCIAL STABILITY: SOCIAL INSECURITY: ABUSE: ADULT

## 2024-08-31 SDOH — SOCIAL STABILITY: SOCIAL INSECURITY: HAS ANYONE EVER THREATENED TO HURT YOUR FAMILY OR YOUR PETS?: NO

## 2024-08-31 SDOH — SOCIAL STABILITY: SOCIAL INSECURITY: HAVE YOU HAD ANY THOUGHTS OF HARMING ANYONE ELSE?: NO

## 2024-08-31 SDOH — SOCIAL STABILITY: SOCIAL INSECURITY: HAVE YOU HAD THOUGHTS OF HARMING ANYONE ELSE?: NO

## 2024-08-31 SDOH — SOCIAL STABILITY: SOCIAL INSECURITY: ARE YOU OR HAVE YOU BEEN THREATENED OR ABUSED PHYSICALLY, EMOTIONALLY, OR SEXUALLY BY ANYONE?: NO

## 2024-08-31 SDOH — SOCIAL STABILITY: SOCIAL INSECURITY: DOES ANYONE TRY TO KEEP YOU FROM HAVING/CONTACTING OTHER FRIENDS OR DOING THINGS OUTSIDE YOUR HOME?: NO

## 2024-08-31 SDOH — SOCIAL STABILITY: SOCIAL INSECURITY: DO YOU FEEL ANYONE HAS EXPLOITED OR TAKEN ADVANTAGE OF YOU FINANCIALLY OR OF YOUR PERSONAL PROPERTY?: NO

## 2024-08-31 SDOH — SOCIAL STABILITY: SOCIAL INSECURITY: WERE YOU ABLE TO COMPLETE ALL THE BEHAVIORAL HEALTH SCREENINGS?: YES

## 2024-08-31 ASSESSMENT — LIFESTYLE VARIABLES
SUBSTANCE_ABUSE_PAST_12_MONTHS: NO
AUDIT-C TOTAL SCORE: 1
SKIP TO QUESTIONS 9-10: 1
PRESCIPTION_ABUSE_PAST_12_MONTHS: NO
HOW MANY STANDARD DRINKS CONTAINING ALCOHOL DO YOU HAVE ON A TYPICAL DAY: 1 OR 2
HOW OFTEN DO YOU HAVE 6 OR MORE DRINKS ON ONE OCCASION: NEVER
HOW OFTEN DO YOU HAVE A DRINK CONTAINING ALCOHOL: MONTHLY OR LESS
AUDIT-C TOTAL SCORE: 1

## 2024-08-31 ASSESSMENT — COGNITIVE AND FUNCTIONAL STATUS - GENERAL
PATIENT BASELINE BEDBOUND: NO
DAILY ACTIVITIY SCORE: 24
MOBILITY SCORE: 24

## 2024-08-31 ASSESSMENT — ACTIVITIES OF DAILY LIVING (ADL)
ADEQUATE_TO_COMPLETE_ADL: YES
JUDGMENT_ADEQUATE_SAFELY_COMPLETE_DAILY_ACTIVITIES: YES
BATHING: INDEPENDENT
HEARING - LEFT EAR: FUNCTIONAL
FEEDING YOURSELF: INDEPENDENT
PATIENT'S MEMORY ADEQUATE TO SAFELY COMPLETE DAILY ACTIVITIES?: YES
LACK_OF_TRANSPORTATION: NO
TOILETING: INDEPENDENT
GROOMING: INDEPENDENT
DRESSING YOURSELF: INDEPENDENT
HEARING - RIGHT EAR: FUNCTIONAL
WALKS IN HOME: INDEPENDENT

## 2024-08-31 ASSESSMENT — COLUMBIA-SUICIDE SEVERITY RATING SCALE - C-SSRS
6. HAVE YOU EVER DONE ANYTHING, STARTED TO DO ANYTHING, OR PREPARED TO DO ANYTHING TO END YOUR LIFE?: NO
2. HAVE YOU ACTUALLY HAD ANY THOUGHTS OF KILLING YOURSELF?: NO
1. IN THE PAST MONTH, HAVE YOU WISHED YOU WERE DEAD OR WISHED YOU COULD GO TO SLEEP AND NOT WAKE UP?: NO

## 2024-08-31 ASSESSMENT — PATIENT HEALTH QUESTIONNAIRE - PHQ9
2. FEELING DOWN, DEPRESSED OR HOPELESS: NOT AT ALL
1. LITTLE INTEREST OR PLEASURE IN DOING THINGS: NOT AT ALL
SUM OF ALL RESPONSES TO PHQ9 QUESTIONS 1 & 2: 0

## 2024-08-31 ASSESSMENT — PAIN - FUNCTIONAL ASSESSMENT: PAIN_FUNCTIONAL_ASSESSMENT: 0-10

## 2024-08-31 ASSESSMENT — PAIN SCALES - GENERAL: PAINLEVEL_OUTOF10: 0 - NO PAIN

## 2024-08-31 NOTE — ED TRIAGE NOTES
Patient BIBA from home due to rectal bleeding x1 wk. Patient states he has had periods of dizziness and stools have been dark in color. Patient states he seen GI this past Tuesday. Patient has a Hx lung CA with mets.

## 2024-08-31 NOTE — ED PROVIDER NOTES
HPI   Chief Complaint   Patient presents with    Rectal Bleeding       Patient is a healthy nontoxic-appearing 74-year-old male with past medical history of right lung adenocarcinoma, hypertension, hyperlipidemia, struct of sleep apnea, diverticulosis, abdominal hernia, pancreatic cyst, pulmonary nodules, anxiety, BPH, esophageal reflux, presents the emergency room today for complaint of rectal bleeding.  Patient states earlier today he went golfing, had a beer and then went to a birthday party.  Patient states had bowel movement and he noticed was bloody and black in color.  Patient states symptoms initially began 1 week ago however became worse today.  Patient denies any injuries trauma or falls, abdominal pain, nausea, vomiting, diarrhea or constipation, straining during bowel movements, chest pain or shortness of breath difficulty breathing, lightheadedness, dizziness, syncopal or syncopal event, fever, shaking, or chills.              Patient History   Past Medical History:   Diagnosis Date    Adenocarcinoma of right lung (Multi) 09/06/2023    Aneurysm of ascending aorta without rupture (CMS-[facility]) 09/06/2023    Arteriosclerosis of coronary artery 09/06/2023    Elevated calcium score 18 Agatston units.(25th percentile)    Arthritis     BPH (benign prostatic hyperplasia)     GERD (gastroesophageal reflux disease)     H/O echocardiogram     Last Echo in 2018    HL (hearing loss)     HTN (hypertension) 09/06/2023    Hyperlipidemia 09/06/2023    Dr. Chou follows    Lung cancer (Multi)     recurrent lung cancer    Obstructive sleep apnea syndrome 01/22/2024    Shortness of breath     Sleep apnea     Spondyloarthropathy 09/06/2023    Strain of lumbar region 02/02/2024    Thoracic aortic aneurysm (TAA) (CMS-HCC) 09/06/2023    3.8 - 4 cm     Past Surgical History:   Procedure Laterality Date    BRONCHOSCOPY      COLONOSCOPY  10/14/2015    Colonoscopy (Fiberoptic)    CT ABDOMEN PELVIS ANGIOGRAM W AND/OR WO IV CONTRAST   2020    CT ABDOMEN PELVIS ANGIOGRAM W AND/OR WO IV CONTRAST 2020 [facility]ICAL LEGACY    CT GUIDED PERCUTANEOUS BIOPSY LUNG  2016    CT GUIDED PERCUTANEOUS BIOPSY LUNG 2016 [facility]    CT GUIDED PERCUTANEOUS BIOPSY LUNG  10/23/2019    CT GUIDED PERCUTANEOUS BIOPSY LUNG 10/23/2019 [facility] LEGACY    CT GUIDED PERCUTANEOUS BIOPSY LUNG  2020    CT GUIDED PERCUTANEOUS BIOPSY LUNG 2020 [facility]    LUNG LOBECTOMY Right     OTHER SURGICAL HISTORY  2016    Thoracoscopy (Therapeutic) W/ Lobectomy    SMALL INTESTINE SURGERY  10/14/2015    Small Bowel Resection     Family History   Problem Relation Name Age of Onset    Hypertension Mother      Coronary artery disease Mother      Hypertension Father      Coronary artery disease Father      Heart attack Father       Social History     Tobacco Use    Smoking status: Former     Current packs/day: 0.00     Average packs/day: 2.0 packs/day for 34.0 years (68.0 ttl pk-yrs)     Types: Cigarettes     Start date:      Quit date:      Years since quittin.6    Smokeless tobacco: Never   Vaping Use    Vaping status: Never Used   Substance Use Topics    Alcohol use: Yes     Alcohol/week: 6.0 standard drinks of alcohol     Types: 6 Standard drinks or equivalent per week     Comment: occasional    Drug use: Never       Physical Exam   ED Triage Vitals [24 1657]   Temperature Heart Rate Respirations BP   37 °C (98.6 °F) 76 18 123/66      SpO2 Temp src Heart Rate Source Patient Position   -- -- -- Lying      BP Location FiO2 (%)     Right arm --       Physical Exam  Vitals and nursing note reviewed. Exam conducted with a chaperone present.   Constitutional:       General: He is not in acute distress.     Appearance: Normal appearance. He is not ill-appearing, toxic-appearing or diaphoretic.   HENT:      Head: Normocephalic.      Nose: Nose normal.      Mouth/Throat:      Mouth: Mucous membranes are moist.   Eyes:       Extraocular Movements: Extraocular movements intact.      Pupils: Pupils are equal, round, and reactive to light.   Cardiovascular:      Rate and Rhythm: Normal rate and regular rhythm.      Pulses: Normal pulses.      Heart sounds: Normal heart sounds. No murmur heard.     No friction rub. No gallop.   Pulmonary:      Effort: Pulmonary effort is normal. No respiratory distress.      Breath sounds: Normal breath sounds. No stridor. No wheezing, rhonchi or rales.   Chest:      Chest wall: No tenderness.   Abdominal:      General: Abdomen is flat. There is no distension.      Palpations: Abdomen is soft. There is no mass.      Tenderness: There is no abdominal tenderness. There is no right CVA tenderness, left CVA tenderness, guarding or rebound.      Hernia: No hernia is present.   Genitourinary:     Rectum: Guaiac result positive.   Musculoskeletal:         General: Normal range of motion.      Cervical back: Normal range of motion and neck supple.   Skin:     General: Skin is warm and dry.      Capillary Refill: Capillary refill takes less than 2 seconds.      Coloration: Skin is not jaundiced or pale.      Findings: No bruising, erythema, lesion or rash.   Neurological:      Mental Status: He is alert.           ED Course & MDM   ED Course as of 08/31/24 1952   Sat Aug 31, 2024   1844 OCCULT BLOOD, STOOL X1(!): Positive [EC]      ED Course User Index  [EC] KRISTYN Black-CNP         Diagnoses as of 08/31/24 1952   Rectal bleeding                 No data recorded     [place] Coma Scale Score: 15 (08/31/24 1841 : Babrie Sol RN)                           Medical Decision Making  Given patient's complaint presentation a thorough exam was performed.  Patient has no reproducible abdominal tenderness upon palpation with bowel sounds present in all 4 quadrants, no adventitious lung sounds auscultated, speaking complete sentences no respiratory distress, cardiac sounds auscultated are regular, remains  neurologically intact no focal deficits, if low suspicion for acute abdomen.  Rectal exam was performed as well and this confirmed black and dark red stool which I suspect is due to GI hemorrhage.  There were no hemorrhoids present on exam.  Lab work was ordered including CT angio of the abdomen and pelvis. Lab work reveals several irregularities without any critical lab values, guaiac occult stool was positive.  Given this finding I consulted GI specialist Dr. Britton.  Dr. Britton recommends admission to medical team and will see patient tomorrow.  I consult hospitalist Dr. Ramos regards to admission for rectal bleeding.  Dr. Ramos is agreed admit patient to his service and will manage inpatient care.    FAROOQ Black     Portions of this note were generated using digital voice recognition software, and may contain grammatical errors      Procedure  Procedures     FAROOQ Black  08/31/24 1952

## 2024-09-01 VITALS
HEART RATE: 69 BPM | SYSTOLIC BLOOD PRESSURE: 148 MMHG | HEIGHT: 75 IN | WEIGHT: 232 LBS | BODY MASS INDEX: 28.85 KG/M2 | RESPIRATION RATE: 17 BRPM | TEMPERATURE: 97.9 F | OXYGEN SATURATION: 96 % | DIASTOLIC BLOOD PRESSURE: 65 MMHG

## 2024-09-01 LAB
ANION GAP SERPL CALC-SCNC: 12 MMOL/L (ref 10–20)
BUN SERPL-MCNC: 10 MG/DL (ref 6–23)
CALCIUM SERPL-MCNC: 8.8 MG/DL (ref 8.6–10.3)
CHLORIDE SERPL-SCNC: 108 MMOL/L (ref 98–107)
CO2 SERPL-SCNC: 24 MMOL/L (ref 21–32)
CREAT SERPL-MCNC: 0.96 MG/DL (ref 0.5–1.3)
EGFRCR SERPLBLD CKD-EPI 2021: 82 ML/MIN/1.73M*2
ERYTHROCYTE [DISTWIDTH] IN BLOOD BY AUTOMATED COUNT: 12.9 % (ref 11.5–14.5)
GLUCOSE SERPL-MCNC: 98 MG/DL (ref 74–99)
HCT VFR BLD AUTO: 45.2 % (ref 41–52)
HGB BLD-MCNC: 14.4 G/DL (ref 13.5–17.5)
HOLD SPECIMEN: NORMAL
MCH RBC QN AUTO: 30.2 PG (ref 26–34)
MCHC RBC AUTO-ENTMCNC: 31.9 G/DL (ref 32–36)
MCV RBC AUTO: 95 FL (ref 80–100)
NRBC BLD-RTO: 0 /100 WBCS (ref 0–0)
PLATELET # BLD AUTO: 200 X10*3/UL (ref 150–450)
POTASSIUM SERPL-SCNC: 4.5 MMOL/L (ref 3.5–5.3)
RBC # BLD AUTO: 4.77 X10*6/UL (ref 4.5–5.9)
SODIUM SERPL-SCNC: 139 MMOL/L (ref 136–145)
WBC # BLD AUTO: 5.3 X10*3/UL (ref 4.4–11.3)

## 2024-09-01 PROCEDURE — 99232 SBSQ HOSP IP/OBS MODERATE 35: CPT | Performed by: STUDENT IN AN ORGANIZED HEALTH CARE EDUCATION/TRAINING PROGRAM

## 2024-09-01 PROCEDURE — 36415 COLL VENOUS BLD VENIPUNCTURE: CPT | Performed by: INTERNAL MEDICINE

## 2024-09-01 PROCEDURE — 2500000001 HC RX 250 WO HCPCS SELF ADMINISTERED DRUGS (ALT 637 FOR MEDICARE OP): Performed by: INTERNAL MEDICINE

## 2024-09-01 PROCEDURE — 99223 1ST HOSP IP/OBS HIGH 75: CPT | Performed by: PHYSICIAN ASSISTANT

## 2024-09-01 PROCEDURE — 80048 BASIC METABOLIC PNL TOTAL CA: CPT | Performed by: INTERNAL MEDICINE

## 2024-09-01 PROCEDURE — G0378 HOSPITAL OBSERVATION PER HR: HCPCS

## 2024-09-01 PROCEDURE — 85027 COMPLETE CBC AUTOMATED: CPT | Performed by: INTERNAL MEDICINE

## 2024-09-01 PROCEDURE — 2500000002 HC RX 250 W HCPCS SELF ADMINISTERED DRUGS (ALT 637 FOR MEDICARE OP, ALT 636 FOR OP/ED): Performed by: INTERNAL MEDICINE

## 2024-09-01 PROCEDURE — 2500000004 HC RX 250 GENERAL PHARMACY W/ HCPCS (ALT 636 FOR OP/ED): Performed by: INTERNAL MEDICINE

## 2024-09-01 RX ADMIN — LISINOPRIL 2.5 MG: 5 TABLET ORAL at 08:43

## 2024-09-01 RX ADMIN — Medication 100 MG: at 08:43

## 2024-09-01 RX ADMIN — TOPIRAMATE 12.5 MG: 25 TABLET, FILM COATED ORAL at 08:43

## 2024-09-01 RX ADMIN — PANTOPRAZOLE SODIUM 40 MG: 40 INJECTION, POWDER, FOR SOLUTION INTRAVENOUS at 08:42

## 2024-09-01 RX ADMIN — THIAMINE HCL TAB 100 MG 100 MG: 100 TAB at 08:43

## 2024-09-01 RX ADMIN — ROSUVASTATIN CALCIUM 10 MG: 10 TABLET, FILM COATED ORAL at 08:43

## 2024-09-01 RX ADMIN — CYANOCOBALAMIN TAB 1000 MCG 1000 MCG: 1000 TAB at 08:43

## 2024-09-01 RX ADMIN — PANTOPRAZOLE SODIUM 40 MG: 40 INJECTION, POWDER, FOR SOLUTION INTRAVENOUS at 20:30

## 2024-09-01 RX ADMIN — SODIUM CHLORIDE 100 ML/HR: 9 INJECTION, SOLUTION INTRAVENOUS at 00:22

## 2024-09-01 ASSESSMENT — COGNITIVE AND FUNCTIONAL STATUS - GENERAL
DAILY ACTIVITIY SCORE: 24
MOBILITY SCORE: 24
DAILY ACTIVITIY SCORE: 24
MOBILITY SCORE: 24

## 2024-09-01 ASSESSMENT — ENCOUNTER SYMPTOMS
TROUBLE SWALLOWING: 0
WHEEZING: 0
CONFUSION: 0
COUGH: 1
HEADACHES: 1
DYSURIA: 0
JOINT SWELLING: 0
APPETITE CHANGE: 1
CHILLS: 0
NUMBNESS: 0
DIZZINESS: 0
ARTHRALGIAS: 0
HEMATURIA: 0
SHORTNESS OF BREATH: 0
WEAKNESS: 0
EYE PAIN: 0
FEVER: 0
SORE THROAT: 0
UNEXPECTED WEIGHT CHANGE: 0
MYALGIAS: 0

## 2024-09-01 ASSESSMENT — PAIN SCALES - GENERAL
PAINLEVEL_OUTOF10: 0 - NO PAIN
PAINLEVEL_OUTOF10: 0 - NO PAIN

## 2024-09-01 NOTE — CONSULTS
Department of Internal Medicine  Gastroenterology  Consult note      Reason for Consult: Rectal Bleed    Chief Complaint: Hematochezia    History Obtained from: chart review and patient reports    History of Present Illness      The patient is a 74 y.o. male with signficant past medical history of right lung adenocarcinoma, hypertension, hyperlipidemia, SAURABH, diverticulosis, pancreatic cyst, pulmonary nodules, anxiety, GERD, and BPH who presents for hematochezia.    He states he has been seeing hematochezia over the last 1 to 2 weeks.  He describes the blood as being intermixed with his stool, in the toilet bowl, and on the toilet paper.  He describes it as a moderate amount.  Yesterday 831 he passed a large amount of just blood that he described as dark red and clots.  He endorses some mild abdominal pain that he describes as sharp and shooting in nature and usually across his bellybutton.  He endorses a lot of bloating and gas pain.  He does note 1 day of nausea last week without vomiting.  He normally moves his bowels every day to every other day and uses align probiotic to assist with his bowel movements.  He endorses occasional straining and attributes his constipation to chronic dehydration.    He denies any odynophagia, dysphagia, diarrhea, or melena.  He has history of acid reflux and takes Protonix as needed 2-3 times a week.    He denies use of tobacco since 2001.  He states he drinks 1-2 alcoholic beverages 2-3 times a week.  He denies use of recreational drugs or NSAIDs.  He takes Tylenol as needed.    Abdominal surgeries include bowel resection for diverticulitis (2005).  He states his last colonoscopy was over 10 years ago at [facility] And had polyps.  He was told to follow-up every 3 to 4 years but did not.  He did recently see Dr. Alexander at [facility] who recommended an EGD and colonoscopy.  This have not yet been scheduled.    Allergies  Atorvastatin and Azithromycin    Current  Medication    Current Facility-Administered Medications:     acetaminophen (Tylenol) tablet 650 mg, 650 mg, oral, q4h PRN, Walter Ramos DO    ALPRAZolam (Xanax) tablet 0.5 mg, 0.5 mg, oral, Nightly PRN, Walter Ramos DO    cyanocobalamin (Vitamin B-12) tablet 1,000 mcg, 1,000 mcg, oral, Daily, Walter Ramos DO    fluticasone (Flonase) nasal spray 1 spray, 1 spray, Each Nostril, Daily PRN, Walter Ramos DO    lidocaine-diphenhydrAMINE-Maalox 1:1:1 Magic Mouthwash, 10 mL, Swish & Spit, q6h PRN, Walter Ramos DO    lisinopril tablet 2.5 mg, 2.5 mg, oral, Daily, Walter Ramos DO    ondansetron (Zofran) injection 4 mg, 4 mg, intravenous, q8h PRN, Walter Ramos DO    pantoprazole (ProtoNix) injection 40 mg, 40 mg, intravenous, BID, Walter Ramos DO, 40 mg at 08/31/24 2300    pyridoxine (Vitamin B-6) tablet 100 mg, 100 mg, oral, Daily, Walter Ramos DO    rosuvastatin (Crestor) tablet 10 mg, 10 mg, oral, Daily, Walter Ramos DO    sodium chloride 0.9% infusion, 100 mL/hr, intravenous, Continuous, Walter Ramos DO, Last Rate: 100 mL/hr at 09/01/24 0613, 100 mL/hr at 09/01/24 0613    thiamine (Vitamin B-1) tablet 100 mg, 100 mg, oral, Daily, Walter Ramos DO    topiramate (Topamax) tablet 12.5 mg, 12.5 mg, oral, Daily, Walter Ramos DO    Past Medical History  Active Ambulatory Problems     Diagnosis Date Noted    Adenocarcinoma of right lung (Multi) 09/06/2023    Arteriosclerosis of coronary artery 09/06/2023    Diverticulosis 09/06/2023    Hernia, abdominal 09/06/2023    HTN (hypertension) 09/06/2023    Hyperlipidemia 09/06/2023    Osteoarthritis of spine with radiculopathy, lumbar region 09/06/2023    Pancreatic cyst (HHS-HCC) 09/06/2023    Pulmonary nodules/lesions, multiple 09/06/2023    Shortness of breath 09/06/2023    Peripheral neuropathy 12/08/2023    Anxiety 12/29/2023    BPH (benign prostatic hyperplasia) 02/02/2017    Abdominal pain 12/29/2023    Preoperative cardiovascular examination 01/10/2024    Gastroesophageal reflux  disease 01/22/2024    Obstructive sleep apnea syndrome 01/22/2024    Solitary pulmonary nodule 11/29/2023    Hospital discharge follow-up 07/22/2024     Resolved Ambulatory Problems     Diagnosis Date Noted    Aneurysm of ascending aorta without rupture (CMS-HCC) 09/06/2023    Bronchitis 12/29/2023    Diverticulitis 09/06/2023    Strain of lumbar region 02/02/2024    Spondyloarthropathy 09/06/2023    Small bowel obstruction (Multi) 05/26/2024     Past Medical History:   Diagnosis Date    Arthritis     GERD (gastroesophageal reflux disease)     H/O echocardiogram     HL (hearing loss)     Lung cancer (Multi)     Sleep apnea     Thoracic aortic aneurysm (TAA) (CMS-HCC) 09/06/2023       Past Surgical History  Past Surgical History:   Procedure Laterality Date    BRONCHOSCOPY      COLONOSCOPY  10/14/2015    Colonoscopy (Fiberoptic)    CT ABDOMEN PELVIS ANGIOGRAM W AND/OR WO IV CONTRAST  01/05/2020    CT ABDOMEN PELVIS ANGIOGRAM W AND/OR WO IV CONTRAST 1/5/2020 [facility] CLINICAL LEGACY    CT GUIDED PERCUTANEOUS BIOPSY LUNG  05/04/2016    CT GUIDED PERCUTANEOUS BIOPSY LUNG 5/4/2016 [facility] AIB LEGACY    CT GUIDED PERCUTANEOUS BIOPSY LUNG  10/23/2019    CT GUIDED PERCUTANEOUS BIOPSY LUNG 10/23/2019 [facility] AIB LEGACY    CT GUIDED PERCUTANEOUS BIOPSY LUNG  02/27/2020    CT GUIDED PERCUTANEOUS BIOPSY LUNG 2/27/2020 [facility] AIB LEGACY    LUNG LOBECTOMY Right     OTHER SURGICAL HISTORY  06/22/2016    Thoracoscopy (Therapeutic) W/ Lobectomy    SMALL INTESTINE SURGERY  10/14/2015    Small Bowel Resection       Family History  Family History   Problem Relation Name Age of Onset    Hypertension Mother      Coronary artery disease Mother      Hypertension Father      Coronary artery disease Father      Heart attack Father       No family history of stomach or colon cancer    Social History  TOBACCO:  reports that he quit smoking about 23 years ago. His smoking use included cigarettes. He started smoking about 57 years ago. He has a 68 pack-year  "smoking history. He has never used smokeless tobacco.  ETOH:  reports current alcohol use of about 6.0 standard drinks of alcohol per week.  DRUGS:  reports no history of drug use.  MARITAL STATUS:   OCCUPATION:    Review of Systems  Review of Systems   Constitutional:  Positive for appetite change. Negative for chills, fever and unexpected weight change.   HENT:  Negative for mouth sores, sore throat and trouble swallowing.    Eyes:  Negative for pain and visual disturbance.   Respiratory:  Positive for cough. Negative for shortness of breath and wheezing.    Cardiovascular:  Negative for chest pain.   Genitourinary:  Negative for decreased urine volume, dysuria and hematuria.   Musculoskeletal:  Negative for arthralgias, joint swelling and myalgias.   Skin:  Negative for pallor and rash.   Neurological:  Positive for headaches. Negative for dizziness, weakness and numbness.   Psychiatric/Behavioral:  Negative for confusion.        PHYSICAL EXAM  VS: /75 (BP Location: Left arm, Patient Position: Lying)   Pulse 61   Temp 35.8 °C (96.4 °F) (Temporal)   Resp 18   Ht 1.905 m (6' 3\")   Wt 105 kg (232 lb)   SpO2 99%   BMI 29.00 kg/m²  Body mass index is 29 kg/m².  Physical Exam  Constitutional:       General: He is not in acute distress.     Appearance: Normal appearance.   HENT:      Head: Normocephalic and atraumatic.      Mouth/Throat:      Mouth: Mucous membranes are moist.      Pharynx: Oropharynx is clear.   Eyes:      General: No scleral icterus.     Extraocular Movements: Extraocular movements intact.      Conjunctiva/sclera: Conjunctivae normal.      Pupils: Pupils are equal, round, and reactive to light.   Cardiovascular:      Rate and Rhythm: Normal rate and regular rhythm.      Heart sounds: No murmur heard.  Pulmonary:      Effort: Pulmonary effort is normal.      Breath sounds: No wheezing or rhonchi.   Abdominal:      General: Bowel sounds are normal. There is no distension.      Palpations: " Abdomen is soft. There is no mass.      Tenderness: There is no abdominal tenderness.      Hernia: No hernia is present.   Musculoskeletal:         General: No swelling or deformity.   Skin:     General: Skin is warm.      Coloration: Skin is not jaundiced.   Neurological:      General: No focal deficit present.      Mental Status: He is alert and oriented to person, place, and time.   Psychiatric:         Mood and Affect: Mood normal.          DATA  Recent blood work and relevant radiology studies were reviewed and discussed with the patient   Results from last 7 days   Lab Units 09/01/24  0618   WBC AUTO x10*3/uL 5.3   RBC AUTO x10*6/uL 4.77   HEMOGLOBIN g/dL 14.4   HEMATOCRIT % 45.2   MCV fL 95   MCHC g/dL 31.9*   RDW % 12.9   PLATELETS AUTO x10*3/uL 200       Results from last 72 hours   Lab Units 09/01/24  0618 08/31/24  1817   SODIUM mmol/L 139 135*   POTASSIUM mmol/L 4.5 4.3   CHLORIDE mmol/L 108* 107   CO2 mmol/L 24 25   BUN mg/dL 10 13   CREATININE mg/dL 0.96 1.00   CALCIUM mg/dL 8.8 9.0   PROTEIN TOTAL g/dL  --  7.0   BILIRUBIN TOTAL mg/dL  --  0.6   ALK PHOS U/L  --  63   AST U/L  --  10   ALT U/L  --  14       Results from last 72 hours   Lab Units 08/31/24  1817   INR  1.1      Latest Reference Range & Units 08/31/24 18:27   OCCULT BLOOD, STOOL X1 Negative  Positive !   !: Data is abnormal    RADIOLOGY REVIEW  CT abdomen pelvis angiogram 8/31/2024  IMPRESSION:  1. No abdominal aortic aneurysm or acute dissection. No CT angiogram evidence of active GI bleed.  2. Colonic diverticulosis. Duodenal diverticulum. Small hiatal hernia.  3. 1.2 cm cystic lesion at the pancreatic tail, not significantly changed since prior CT 01/05/2020. Nonemergent contrast-enhanced MRI/MRCP can be obtained for further characterization.  4. Prostatomegaly.  5. Moderate fat containing ventral hernia at the left lateral abdominal wall.  6. The appendix extends into the right inguinal hernia.  7. Small right pleural  effusion.    ENDOSCOPIC REVIEW  NA    IMPRESSION/RECOMMENDATIONS  The patient is a 74 y.o. male with signficant past medical history of right lung adenocarcinoma, hypertension, hyperlipidemia, SAURABH, diverticulosis, pancreatic cyst, pulmonary nodules, anxiety, GERD, and BPH who presents for hematochezia.    Hematochezia - hgb (9/1)14.4, baselines 13-14 Normal BUN/CR ratio, no concern for rapid upper. FOBT (+), CTA (-). Differential includes, diverticular bleed, AVM, hemorrhoidal, or mass as patient has not had a colonoscopy in 10 years with personal history of colonic polyps  Pancreatic lesion - 1.2 cm cystic lesion in the tail, unchanged from imaging in 2020    PLAN  -Can consider colonoscopy Tuesday 9/3 or as outpatient with Dr Alexander if his overt signs of GIB subside  -Recommend outpatient MRI pancreas without contrast for further evaluation pancreatic lesion  -Currently on pantoprazole 40 mg twice daily, okay for once daily dosing  -Currently n.p.o., okay for full liquids. If decides he would like his colonoscopy as outpatient can advance to general diet  -Continue supportive care per primary team    Discussed with Dr. Britton, GI to follow    (Electronically signed byAnuradha Padgett PA-C on 9/1/2024 at 8:09 AM)    Inpatient consult to Gastroenterology  Consult performed by: Anuradha Padgett PA-C  Consult ordered by: Walter Ramos DO

## 2024-09-01 NOTE — PROGRESS NOTES
Tao Renee is a 74 y.o. male on day 0 of admission presenting with Melena.    Subjective   Hgb stable, has not had a BM since admission, feels well    Objective     Last Recorded Vitals  /77 (Patient Position: Sitting)   Pulse 66   Temp 36.4 °C (97.5 °F)   Resp 17   Wt 105 kg (232 lb)   SpO2 98%     Physical Exam    G: aox3, NAD, cooperative  HENT: neck supple, no JVD  Eyes: clear sclera  CV: RRR s1 s2  L: clear  Abd: soft, NT, non distended  Ext: no c/c/e  N: no appreciable acute focal deficits  Psych: appropriate mood and behavior       Assessment/Plan      Melena, concern for upper GI bleed  Diverticulosis  Pancreatic tail cystic lesion    HTN, HLD  GERD  h/o lung cancer s/p lobectomy and chemotherapy  DVT ppx  Full code    Plan:  - Hgb stable, continue PPI, CTA negative for bleeding, GI ok will liquid diet, possible endoscopy during hospitalization (Tuesday) depending on clinical course vs outpatinet  - MRI pancreas as outpatient    Leon Lorenzana DO

## 2024-09-01 NOTE — CARE PLAN
The patient's goals for the shift include      The clinical goals for the shift include Pt will remain free from falls throughout the shift    Problem: Pain - Adult  Goal: Verbalizes/displays adequate comfort level or baseline comfort level  Outcome: Progressing     Problem: Safety - Adult  Goal: Free from fall injury  Outcome: Progressing     Problem: Discharge Planning  Goal: Discharge to home or other facility with appropriate resources  Outcome: Progressing     Problem: Chronic Conditions and Co-morbidities  Goal: Patient's chronic conditions and co-morbidity symptoms are monitored and maintained or improved  Outcome: Progressing

## 2024-09-01 NOTE — H&P
"                                             Patient Name: Tao Renee   YOB: 1950    Subjective:    Tao Renee is a 74 y.o. male who presents to [facility] with complaints of dark bloody stools.  He states this has been happening for the past couple weeks, admits to dark stools covered in dark blood.  He did tell this to his GI doctor and had blood test done which were normal.  He is going  to follow-up with his GI doctor but has not yet.  Today patient went to play on a golf, he did not play well.  He felt lightheaded and tired, dizzy almost passing out in the golf cart.  An episode where he passed gas and there was a lot of blood and blood clots that came out.  He admits to some abdominal bloating.    A 10 point ROS was completed and is negative expect as stated in HPI.     Past Medical History:  Lung cancer s/p lobectomy and chemo  H/o GI bleed  HTN  HLD  GERD    Past Surgical History:  Lobectomy     Social History: Tobacco - Former User, Alcohol - social drinker, Recreational Drugs - none    Family History: family history includes Coronary artery disease in his father and mother; Heart attack in his father; Hypertension in his father and mother.     Objective:    /70 (BP Location: Right arm, Patient Position: Lying)   Pulse 71   Temp 37 °C (98.6 °F)   Resp 19   Ht 1.88 m (6' 2\")   Wt 107 kg (235 lb)   SpO2 100%   BMI 30.17 kg/m²     Physical Exam:    GEN: Reliable historian. Well developed, not in acute distress.   HEENT: Normocephalic and atraumatic.  Mucous membranes moist.  Clear sclera, PERRL, EOMI.  CARDIO: Regular rate and rhythm.  No murmurs, rubs, or gallops. No LE edema  RESP: Clear to auscultation b/l. No wheezes, rales or rhonchi. Good respiratory effort.   ABD: +BS x4, soft, non-tender. Not distended. No rebound or guarding.  MSK: Grossly normal inspection. No joint swelling or obvious deformities. ROM intact  NEURO: A&O X 3. CN " II-XII are grossly intact. No focal deficits.   SKIN: Warm and dry, no lesions, no rashes.  PSYCH: Appropriate mood and affect.     Scheduled medications  cyanocobalamin, 1,000 mcg, intramuscular, Once      Continuous medications     PRN medications       Assessment and Plan:    Tao Renee is a 74 y.o. male who presents to [facility] with complaints of Melena    # Melena  # HTN  # HLD  # GERD  # Pancreatic tail cystic lesion  # Diverticulosis   # h/o lung cancer s/p lobectomy and chemo    He denies any use of blood thinners, does not use any NSAIDs.  He does use Tylenol as needed for pain.  Vital signs are stable, hemoglobin is stable.  Will admit patient for GI evaluation.  Clear liquid diet for now, n.p.o. after midnight.  Ordered Protonix 40 mg IV twice daily, transfuse if hemoglobin drops below 7.  Holding DVT prophylaxis secondary to acute blood loss.  CT angio abdomen was performed negative for active bleed, there is diverticulosis.  There is cystic lesion in pancreatic tail, recommend outpatient monitoring and follow up.      Walter Ramos, DO  Senior Attending Physician  [facility] Medicine  Clinical Instructor

## 2024-09-02 PROBLEM — K62.5 RECTAL BLEEDING: Status: ACTIVE | Noted: 2024-09-02

## 2024-09-02 LAB
ANION GAP SERPL CALC-SCNC: 11 MMOL/L (ref 10–20)
BUN SERPL-MCNC: 17 MG/DL (ref 6–23)
CALCIUM SERPL-MCNC: 8.8 MG/DL (ref 8.6–10.3)
CHLORIDE SERPL-SCNC: 110 MMOL/L (ref 98–107)
CO2 SERPL-SCNC: 25 MMOL/L (ref 21–32)
CREAT SERPL-MCNC: 0.97 MG/DL (ref 0.5–1.3)
EGFRCR SERPLBLD CKD-EPI 2021: 81 ML/MIN/1.73M*2
ERYTHROCYTE [DISTWIDTH] IN BLOOD BY AUTOMATED COUNT: 12.8 % (ref 11.5–14.5)
GLUCOSE SERPL-MCNC: 105 MG/DL (ref 74–99)
HCT VFR BLD AUTO: 47.2 % (ref 41–52)
HGB BLD-MCNC: 15 G/DL (ref 13.5–17.5)
MCH RBC QN AUTO: 30.1 PG (ref 26–34)
MCHC RBC AUTO-ENTMCNC: 31.8 G/DL (ref 32–36)
MCV RBC AUTO: 95 FL (ref 80–100)
NRBC BLD-RTO: 0 /100 WBCS (ref 0–0)
PLATELET # BLD AUTO: 196 X10*3/UL (ref 150–450)
POTASSIUM SERPL-SCNC: 4.6 MMOL/L (ref 3.5–5.3)
RBC # BLD AUTO: 4.98 X10*6/UL (ref 4.5–5.9)
SODIUM SERPL-SCNC: 141 MMOL/L (ref 136–145)
WBC # BLD AUTO: 6.4 X10*3/UL (ref 4.4–11.3)

## 2024-09-02 PROCEDURE — 99232 SBSQ HOSP IP/OBS MODERATE 35: CPT

## 2024-09-02 PROCEDURE — 2500000004 HC RX 250 GENERAL PHARMACY W/ HCPCS (ALT 636 FOR OP/ED): Performed by: INTERNAL MEDICINE

## 2024-09-02 PROCEDURE — 1100000001 HC PRIVATE ROOM DAILY

## 2024-09-02 PROCEDURE — 2500000004 HC RX 250 GENERAL PHARMACY W/ HCPCS (ALT 636 FOR OP/ED): Performed by: STUDENT IN AN ORGANIZED HEALTH CARE EDUCATION/TRAINING PROGRAM

## 2024-09-02 PROCEDURE — 36415 COLL VENOUS BLD VENIPUNCTURE: CPT | Performed by: STUDENT IN AN ORGANIZED HEALTH CARE EDUCATION/TRAINING PROGRAM

## 2024-09-02 PROCEDURE — 80048 BASIC METABOLIC PNL TOTAL CA: CPT | Performed by: STUDENT IN AN ORGANIZED HEALTH CARE EDUCATION/TRAINING PROGRAM

## 2024-09-02 PROCEDURE — 2500000002 HC RX 250 W HCPCS SELF ADMINISTERED DRUGS (ALT 637 FOR MEDICARE OP, ALT 636 FOR OP/ED): Performed by: INTERNAL MEDICINE

## 2024-09-02 PROCEDURE — 2500000001 HC RX 250 WO HCPCS SELF ADMINISTERED DRUGS (ALT 637 FOR MEDICARE OP): Performed by: INTERNAL MEDICINE

## 2024-09-02 PROCEDURE — 99232 SBSQ HOSP IP/OBS MODERATE 35: CPT | Performed by: STUDENT IN AN ORGANIZED HEALTH CARE EDUCATION/TRAINING PROGRAM

## 2024-09-02 PROCEDURE — 85027 COMPLETE CBC AUTOMATED: CPT | Performed by: STUDENT IN AN ORGANIZED HEALTH CARE EDUCATION/TRAINING PROGRAM

## 2024-09-02 PROCEDURE — 2500000001 HC RX 250 WO HCPCS SELF ADMINISTERED DRUGS (ALT 637 FOR MEDICARE OP)

## 2024-09-02 RX ORDER — POLYETHYLENE GLYCOL 3350, SODIUM CHLORIDE, SODIUM BICARBONATE, POTASSIUM CHLORIDE 420; 11.2; 5.72; 1.48 G/4L; G/4L; G/4L; G/4L
2000 POWDER, FOR SOLUTION ORAL ONCE
Status: COMPLETED | OUTPATIENT
Start: 2024-09-03 | End: 2024-09-03

## 2024-09-02 RX ORDER — ALUMINUM HYDROXIDE, MAGNESIUM HYDROXIDE, AND SIMETHICONE 1200; 120; 1200 MG/30ML; MG/30ML; MG/30ML
SUSPENSION ORAL 4 TIMES DAILY PRN
Status: CANCELLED | OUTPATIENT
Start: 2024-09-02

## 2024-09-02 RX ORDER — POLYETHYLENE GLYCOL 3350, SODIUM CHLORIDE, SODIUM BICARBONATE, POTASSIUM CHLORIDE 420; 11.2; 5.72; 1.48 G/4L; G/4L; G/4L; G/4L
2000 POWDER, FOR SOLUTION ORAL ONCE
Status: COMPLETED | OUTPATIENT
Start: 2024-09-02 | End: 2024-09-02

## 2024-09-02 RX ORDER — ALUMINUM HYDROXIDE, MAGNESIUM HYDROXIDE, AND SIMETHICONE 1200; 120; 1200 MG/30ML; MG/30ML; MG/30ML
20 SUSPENSION ORAL 4 TIMES DAILY PRN
Status: DISCONTINUED | OUTPATIENT
Start: 2024-09-02 | End: 2024-09-03 | Stop reason: HOSPADM

## 2024-09-02 RX ADMIN — CYANOCOBALAMIN TAB 1000 MCG 1000 MCG: 1000 TAB at 10:43

## 2024-09-02 RX ADMIN — SODIUM CHLORIDE 75 ML/HR: 9 INJECTION, SOLUTION INTRAVENOUS at 05:00

## 2024-09-02 RX ADMIN — Medication 100 MG: at 10:43

## 2024-09-02 RX ADMIN — TOPIRAMATE 12.5 MG: 25 TABLET, FILM COATED ORAL at 10:43

## 2024-09-02 RX ADMIN — PANTOPRAZOLE SODIUM 40 MG: 40 INJECTION, POWDER, FOR SOLUTION INTRAVENOUS at 20:14

## 2024-09-02 RX ADMIN — LISINOPRIL 2.5 MG: 5 TABLET ORAL at 10:43

## 2024-09-02 RX ADMIN — THIAMINE HCL TAB 100 MG 100 MG: 100 TAB at 10:43

## 2024-09-02 RX ADMIN — POLYETHYLENE GLYCOL-3350, SODIUM CHLORIDE, POTASSIUM CHLORIDE AND SODIUM BICARBONATE 2000 ML: 420; 11.2; 5.72; 1.48 POWDER, FOR SOLUTION ORAL at 17:46

## 2024-09-02 RX ADMIN — ROSUVASTATIN CALCIUM 10 MG: 10 TABLET, FILM COATED ORAL at 10:43

## 2024-09-02 RX ADMIN — PANTOPRAZOLE SODIUM 40 MG: 40 INJECTION, POWDER, FOR SOLUTION INTRAVENOUS at 10:43

## 2024-09-02 ASSESSMENT — COGNITIVE AND FUNCTIONAL STATUS - GENERAL
MOBILITY SCORE: 24
DAILY ACTIVITIY SCORE: 24

## 2024-09-02 ASSESSMENT — PAIN - FUNCTIONAL ASSESSMENT: PAIN_FUNCTIONAL_ASSESSMENT: 0-10

## 2024-09-02 ASSESSMENT — PAIN SCALES - GENERAL
PAINLEVEL_OUTOF10: 0 - NO PAIN
PAINLEVEL_OUTOF10: 0 - NO PAIN

## 2024-09-02 NOTE — CARE PLAN
Problem: Pain - Adult  Goal: Verbalizes/displays adequate comfort level or baseline comfort level  Outcome: Progressing     Problem: Safety - Adult  Goal: Free from fall injury  Outcome: Progressing     Problem: Discharge Planning  Goal: Discharge to home or other facility with appropriate resources  Outcome: Progressing     Problem: Chronic Conditions and Co-morbidities  Goal: Patient's chronic conditions and co-morbidity symptoms are monitored and maintained or improved  Outcome: Progressing   The patient's goals for the shift include      The clinical goals for the shift include Pt will remain free from falls throughout the shift    Over the shift, the patient did not make progress toward the following goals. Barriers to progression include patient experiencing some blood in his stool. Recommendations to address these barriers include remind patient to use call light when in need of assistance.

## 2024-09-02 NOTE — PROGRESS NOTES
Tao Renee is a 74 y.o. male on day 0 of admission presenting with Melena.      Assessment / Plan        Tao Renee is a 74 y.o. male with a past medical history of right lung adenocarcinoma s/p chemo and lobectomy, CAD, hypertension, hyperlipidemia, diverticulitis status post Coker, prior SBO who presents to [facility] on 8/31 with complaints of dark bloody stools     Daily progress  9/2/2024: Patient had bloody bowel movement at 10 AM this morning.  GI likely to perform colonoscopy tomorrow.  Hemoglobin is stable    Acute Medical conditions  #Melena, concern for upper GI bleed  #Diverticulosis  Plan:  -Clear liquid diet without reds  -Pantoprazole 40 IV twice daily  -GI likely to perform colonoscopy tomorrow  -Type and screen  -Monitor hemoglobin  -Mylanta    # Pancreatic tail cystic lesion  Plan:  -MRI pancreas as outpatient    Chronic Medical conditions  Hypertension: Lisinopril 2.5 mg daily  Hyperlipidemia: Crestor 10 mg daily    DVT ppx: SCD  IVF: 75 mL/h normal saline  Diet: Clear liquid diet without reds  Code: Full  Consults: GI       Jesus Garcia DO   PGY-1, Internal Medicine  This is a preliminary note, please await attending attestation for final A/P    Subjective     Patient seen and examined. No acute overnight events.  Patient had a bloody bowel movement at 10 AM this morning.  Otherwise he is doing well.  Would like to get colonoscopy tomorrow and be discharged soon.      Objective       Physical Exam:  General:  Pleasant and cooperative. No apparent distress.  HEENT:  Normocephalic, atraumatic  Chest:  Clear to auscultation bilaterally. No wheezes, rales, or rhonchi.  CV:  Regular rate and rhythm. No murmurs    Abdomen: Abdomen is soft, non-tender, non-distended. BS +   Extremities:  No lower extremity edema or cyanosis.   Neurological:  AAOx3. No focal deficits.  Skin:  Warm and dry.    Last Recorded Vitals  Blood pressure 145/68, pulse 67, temperature  "35.7 °C (96.3 °F), temperature source Temporal, resp. rate 17, height 1.905 m (6' 3\"), weight 105 kg (232 lb), SpO2 100%.  Intake/Output last 3 Shifts:  I/O last 3 completed shifts:  In: 1228.8 (11.7 mL/kg) [I.V.:1228.8 (11.7 mL/kg)]  Out: - (0 mL/kg)   Weight: 105.2 kg     Last CBC & BMP  Lab Results   Component Value Date    GLUCOSE 105 (H) 09/02/2024    CALCIUM 8.8 09/02/2024     09/02/2024    K 4.6 09/02/2024    CO2 25 09/02/2024     (H) 09/02/2024    BUN 17 09/02/2024    CREATININE 0.97 09/02/2024     Lab Results   Component Value Date    WBC 6.4 09/02/2024    HGB 15.0 09/02/2024    HCT 47.2 09/02/2024    MCV 95 09/02/2024     09/02/2024          "

## 2024-09-02 NOTE — PROGRESS NOTES
Tao Renee is a 74 y.o. male on day 0 of admission presenting with Melena.      Subjective   Tao Renee is a 74 y.o. male with signficant past medical history of right lung adenocarcinoma status post lobectomy, hypertension, hyperlipidemia, SAURABH, diverticulosis, diverticulitis with bowel resection, pancreatic cyst, pulmonary nodules, anxiety, GERD, and BPH who presents for hematochezia.  He states he has had blood in his stool over the past couple of weeks and describes it as dark red with clots. He also complains of bloating.  Last colonoscopy was over 10 years ago with polyps but patient did not follow-up as instructed for every 3 to 4 years.  Recently saw Dr. Benjamin GALINDO who recommended EGD and colonoscopy.  He denies fever, chills, chest pain, abdominal pain, dysphagia, diarrhea, numbness/tingles.    ED course: Afebrile, HR 76, /66, SpO2 98% RA.  Labs significant for sodium 135, creatinine 1, no leukocytosis, Hgb 15.2.  CTA abdomen pelvis shows no evidence of active GI bleed, significant for colonic diverticulosis, stable cystic lesion of pancreatic tail, prostamegaly, small right pleural effusion.    Objective     Last Recorded Vitals  /68 (BP Location: Left arm, Patient Position: Lying)   Pulse 67   Temp 35.7 °C (96.3 °F) (Temporal)   Resp 17   Wt 105 kg (232 lb)   SpO2 100%   Intake/Output last 3 Shifts:    Intake/Output Summary (Last 24 hours) at 9/2/2024 0944  Last data filed at 9/2/2024 0541  Gross per 24 hour   Intake 728.75 ml   Output --   Net 728.75 ml       Admission Weight  Weight: 107 kg (235 lb) (08/31/24 1657)    Daily Weight  08/31/24 : 105 kg (232 lb)    Image Results  CT angio abdomen pelvis w and or wo IV IV contrast  Narrative: Interpreted By:  Kaela Nunes,   STUDY:  CT ANGIO ABDOMEN PELVIS W AND/OR WO IV IV CONTRAST;  8/31/2024 7:24 pm      INDICATION:  Signs/Symptoms:Blood in stool      COMPARISON:  CT abdomen and pelvis 05/26/2024 .  CT angiogram chest,  abdomen,  pelvis 01/05/2020      ACCESSION NUMBER(S):  [id number]      ORDERING CLINICIAN:  GUALBERTO ROGERS      TECHNIQUE:  Noncontrast axial CT images were obtained through the abdomen and  pelvis prior to administration of intravenous contrast. Thin-section  axial images of the abdomen and pelvis in the arterial phase and  portal venous phase after the uneventful administration of  intravenous contrast material. Sagittal and coronal reconstructions.  3-D and MIP reconstructed images were performed on an independent  workstation and provided for review.      FINDINGS:  NON-CONTRAST IMAGING:      Atherosclerotic calcifications are noted at the abdominal aorta and  its branches. No obstructing ureteral calculi are identified.      POST-CONTRAST IMAGING:      Vascular:  No abdominal aortic aneurysm or acute dissection.  The celiac artery, the superior mesenteric artery, the bilateral  renal arteries and the inferior mesenteric artery are patent. The  bilateral common iliac arteries, the bilateral internal iliac  arteries, the bilateral external iliac arteries, the bilateral common  femoral arteries and the visualized bilateral proximal femoral  arteries are patent.          LOWER CHEST: There is a small loculated right pleural effusion.  BONES: Multilevel degenerative changes at the spine.  ABDOMINAL WALL: Postsurgical changes at the anterior abdominal wall.  There is a moderate fat containing hernia at the left lateral  abdominal wall, Spigelian type hernia. There is a small fat  containing umbilical hernia. There are small fat containing bilateral  inguinal hernias. The appendix extends into the right inguinal hernia.      ABDOMEN:      LIVER: Multiple cysts are noted at the liver. The largest at the left  hepatic lobe measures 6.8 x 4.7 cm. BILE DUCTS: Normal caliber.  GALLBLADDER: Present.  PANCREAS: No peripancreatic inflammatory changes. Small  calcifications are noted at the pancreatic parenchyma, suggestive  of  chronic pancreatitis. There is a 1.2 cm cystic lesion at the  pancreatic tail, not significantly changed since prior CT 01/05/2020.  SPLEEN: Within normal limits. ADRENALS: Within normal limits.  KIDNEYS and URETERS: Symmetric renal enhancement. No hydronephrosis.  There are left renal cysts, the largest measures 2.0 cm. There is a  1.9 cm cyst at the superior pole of the right kidney.      RETROPERITONEUM: No retroperitoneal hemorrhage.      PELVIS:      REPRODUCTIVE ORGANS: The prostate measures 6.0 cm in transverse  diameter. BLADDER: The urinary bladder is distended.      BOWEL: There is a small hiatal hernia. No bowel obstruction. There is  a diverticulum at the 2nd segment of the duodenum. There is colonic  diverticulosis with no CT evidence for acute diverticulitis. No  intraluminal hyperdensity to suggest active GI bleed. Postsurgical  changes are noted at the rectosigmoid colon. Normal appendix  extending into the right inguinal hernia. PERITONEUM: No ascites or  free air. No pathologically enlarged lymph nodes are identified.      Impression: 1. No abdominal aortic aneurysm or acute dissection. No CT angiogram  evidence of active GI bleed.  2. Colonic diverticulosis. Duodenal diverticulum. Small hiatal hernia.  3. 1.2 cm cystic lesion at the pancreatic tail, not significantly  changed since prior CT 01/05/2020. Nonemergent contrast-enhanced  MRI/MRCP can be obtained for further characterization.  4. Prostatomegaly.  5. Moderate fat containing ventral hernia at the left lateral  abdominal wall.  6. The appendix extends into the right inguinal hernia.  7. Small right pleural effusion.                  MACRO:  Critical Finding:  See findings. Notification was initiated on  8/31/2024 at 8:14 pm by  Kaela Nunes.  (**-YCF-**) Instructions:      Signed by: Kaela Nunes 8/31/2024 8:19 PM  Dictation workstation:   [id number]      Physical Exam  Constitutional:       Appearance: Normal appearance.    HENT:      Head: Normocephalic.   Eyes:      Extraocular Movements: Extraocular movements intact.      Pupils: Pupils are equal, round, and reactive to light.   Cardiovascular:      Rate and Rhythm: Normal rate and regular rhythm.   Pulmonary:      Effort: Pulmonary effort is normal.      Breath sounds: Normal breath sounds.   Abdominal:      General: There is distension.      Palpations: Abdomen is soft.   Skin:     General: Skin is warm and dry.   Neurological:      General: No focal deficit present.      Mental Status: He is alert and oriented to person, place, and time.   Psychiatric:         Mood and Affect: Mood normal.         Behavior: Behavior normal.         Relevant Results               Assessment/Plan   The patient is a 74 y.o. male with signficant past medical history of right lung adenocarcinoma, hypertension, hyperlipidemia, SAURABH, diverticulosis, diverticulitis with bowel resection, pancreatic cyst, pulmonary nodules, anxiety, GERD, and BPH who presents for hematochezia.    Hematochezia  Colonic diverticulosis, s/p history of bowel resection for diverticulitis  Pancreatic cystic lesion  -CTA abdomen pelvis negative for acute GI bleed, shows stable cystic lesion and colonic diverticulosis  -Patient's hemoglobin within baseline, fecal alcohol blood test positive  -Recommend outpatient MRI pancreas for further evaluation of pancreatic lesion  -Pantoprazole 40 mg twice daily  -Bowel prep ordered, n.p.o. for EGD and colonoscopy tomorrow.    Dr. Tao Curtis   Internal Medicine PGY-1  Available on [place] for any questions.    This is a preliminary note pending signature from the attending physician.

## 2024-09-02 NOTE — CARE PLAN
The patient's goals for the shift include  no bleeding.    The clinical goals for the shift include ptient will have decreased abdomen pain during shift.    Over the shift, the patient did not make progress toward the following goals. Barriers to progression include active gi bleed. Recommendations to address these barriers include consults.

## 2024-09-03 ENCOUNTER — APPOINTMENT (OUTPATIENT)
Dept: GASTROENTEROLOGY | Facility: HOSPITAL | Age: 74
End: 2024-09-03
Payer: MEDICARE

## 2024-09-03 ENCOUNTER — ANESTHESIA (OUTPATIENT)
Dept: GASTROENTEROLOGY | Facility: HOSPITAL | Age: 74
End: 2024-09-03
Payer: MEDICARE

## 2024-09-03 ENCOUNTER — ANESTHESIA EVENT (OUTPATIENT)
Dept: GASTROENTEROLOGY | Facility: HOSPITAL | Age: 74
End: 2024-09-03
Payer: MEDICARE

## 2024-09-03 ENCOUNTER — APPOINTMENT (OUTPATIENT)
Dept: PRIMARY CARE | Facility: CLINIC | Age: 74
End: 2024-09-03
Payer: MEDICARE

## 2024-09-03 VITALS
TEMPERATURE: 96.8 F | HEIGHT: 75 IN | BODY MASS INDEX: 28.85 KG/M2 | SYSTOLIC BLOOD PRESSURE: 165 MMHG | HEART RATE: 73 BPM | WEIGHT: 232 LBS | DIASTOLIC BLOOD PRESSURE: 70 MMHG | OXYGEN SATURATION: 97 % | RESPIRATION RATE: 17 BRPM

## 2024-09-03 LAB
ABO GROUP (TYPE) IN BLOOD: NORMAL
ANION GAP SERPL CALC-SCNC: 9 MMOL/L (ref 10–20)
ANTIBODY SCREEN: NORMAL
BUN SERPL-MCNC: 6 MG/DL (ref 6–23)
CALCIUM SERPL-MCNC: 9.1 MG/DL (ref 8.6–10.3)
CHLORIDE SERPL-SCNC: 111 MMOL/L (ref 98–107)
CO2 SERPL-SCNC: 22 MMOL/L (ref 21–32)
CREAT SERPL-MCNC: 1.09 MG/DL (ref 0.5–1.3)
EGFRCR SERPLBLD CKD-EPI 2021: 71 ML/MIN/1.73M*2
ERYTHROCYTE [DISTWIDTH] IN BLOOD BY AUTOMATED COUNT: 12.7 % (ref 11.5–14.5)
GLUCOSE SERPL-MCNC: 99 MG/DL (ref 74–99)
HCT VFR BLD AUTO: 46.8 % (ref 41–52)
HGB BLD-MCNC: 14.9 G/DL (ref 13.5–17.5)
MCH RBC QN AUTO: 30 PG (ref 26–34)
MCHC RBC AUTO-ENTMCNC: 31.8 G/DL (ref 32–36)
MCV RBC AUTO: 94 FL (ref 80–100)
NRBC BLD-RTO: 0 /100 WBCS (ref 0–0)
PLATELET # BLD AUTO: 198 X10*3/UL (ref 150–450)
POTASSIUM SERPL-SCNC: 4.6 MMOL/L (ref 3.5–5.3)
RBC # BLD AUTO: 4.97 X10*6/UL (ref 4.5–5.9)
RH FACTOR (ANTIGEN D): NORMAL
SODIUM SERPL-SCNC: 137 MMOL/L (ref 136–145)
WBC # BLD AUTO: 6.9 X10*3/UL (ref 4.4–11.3)

## 2024-09-03 PROCEDURE — 2500000001 HC RX 250 WO HCPCS SELF ADMINISTERED DRUGS (ALT 637 FOR MEDICARE OP): Performed by: INTERNAL MEDICINE

## 2024-09-03 PROCEDURE — 0DB38ZX EXCISION OF LOWER ESOPHAGUS, VIA NATURAL OR ARTIFICIAL OPENING ENDOSCOPIC, DIAGNOSTIC: ICD-10-PCS | Performed by: INTERNAL MEDICINE

## 2024-09-03 PROCEDURE — 2500000001 HC RX 250 WO HCPCS SELF ADMINISTERED DRUGS (ALT 637 FOR MEDICARE OP): Performed by: NURSE PRACTITIONER

## 2024-09-03 PROCEDURE — 7100000002 HC RECOVERY ROOM TIME - EACH INCREMENTAL 1 MINUTE: Performed by: STUDENT IN AN ORGANIZED HEALTH CARE EDUCATION/TRAINING PROGRAM

## 2024-09-03 PROCEDURE — 45390 COLONOSCOPY W/RESECTION: CPT | Performed by: INTERNAL MEDICINE

## 2024-09-03 PROCEDURE — 99232 SBSQ HOSP IP/OBS MODERATE 35: CPT

## 2024-09-03 PROCEDURE — 80048 BASIC METABOLIC PNL TOTAL CA: CPT

## 2024-09-03 PROCEDURE — 2500000004 HC RX 250 GENERAL PHARMACY W/ HCPCS (ALT 636 FOR OP/ED): Performed by: ANESTHESIOLOGIST ASSISTANT

## 2024-09-03 PROCEDURE — 88305 TISSUE EXAM BY PATHOLOGIST: CPT | Mod: TC,PARLAB | Performed by: INTERNAL MEDICINE

## 2024-09-03 PROCEDURE — 99238 HOSP IP/OBS DSCHRG MGMT 30/<: CPT | Performed by: INTERNAL MEDICINE

## 2024-09-03 PROCEDURE — 3700000001 HC GENERAL ANESTHESIA TIME - INITIAL BASE CHARGE: Performed by: STUDENT IN AN ORGANIZED HEALTH CARE EDUCATION/TRAINING PROGRAM

## 2024-09-03 PROCEDURE — 2500000004 HC RX 250 GENERAL PHARMACY W/ HCPCS (ALT 636 FOR OP/ED): Performed by: INTERNAL MEDICINE

## 2024-09-03 PROCEDURE — 0DBH8ZZ EXCISION OF CECUM, VIA NATURAL OR ARTIFICIAL OPENING ENDOSCOPIC: ICD-10-PCS | Performed by: INTERNAL MEDICINE

## 2024-09-03 PROCEDURE — 7100000001 HC RECOVERY ROOM TIME - INITIAL BASE CHARGE: Performed by: STUDENT IN AN ORGANIZED HEALTH CARE EDUCATION/TRAINING PROGRAM

## 2024-09-03 PROCEDURE — 36415 COLL VENOUS BLD VENIPUNCTURE: CPT

## 2024-09-03 PROCEDURE — 2500000002 HC RX 250 W HCPCS SELF ADMINISTERED DRUGS (ALT 637 FOR MEDICARE OP, ALT 636 FOR OP/ED): Performed by: INTERNAL MEDICINE

## 2024-09-03 PROCEDURE — A45390 PR COLONOSCOPY FLX W/ENDOSCOPIC MUCOSAL RESECTION: Performed by: ANESTHESIOLOGY

## 2024-09-03 PROCEDURE — 2500000005 HC RX 250 GENERAL PHARMACY W/O HCPCS: Performed by: ANESTHESIOLOGIST ASSISTANT

## 2024-09-03 PROCEDURE — A45390 PR COLONOSCOPY FLX W/ENDOSCOPIC MUCOSAL RESECTION: Performed by: ANESTHESIOLOGIST ASSISTANT

## 2024-09-03 PROCEDURE — 99100 ANES PT EXTEME AGE<1 YR&>70: CPT | Performed by: ANESTHESIOLOGY

## 2024-09-03 PROCEDURE — 43239 EGD BIOPSY SINGLE/MULTIPLE: CPT | Performed by: INTERNAL MEDICINE

## 2024-09-03 PROCEDURE — 2500000001 HC RX 250 WO HCPCS SELF ADMINISTERED DRUGS (ALT 637 FOR MEDICARE OP)

## 2024-09-03 PROCEDURE — 3700000002 HC GENERAL ANESTHESIA TIME - EACH INCREMENTAL 1 MINUTE: Performed by: STUDENT IN AN ORGANIZED HEALTH CARE EDUCATION/TRAINING PROGRAM

## 2024-09-03 PROCEDURE — 86901 BLOOD TYPING SEROLOGIC RH(D): CPT

## 2024-09-03 PROCEDURE — 85027 COMPLETE CBC AUTOMATED: CPT

## 2024-09-03 PROCEDURE — 88305 TISSUE EXAM BY PATHOLOGIST: CPT | Performed by: PATHOLOGY

## 2024-09-03 PROCEDURE — 2720000007 HC OR 272 NO HCPCS: Performed by: STUDENT IN AN ORGANIZED HEALTH CARE EDUCATION/TRAINING PROGRAM

## 2024-09-03 RX ORDER — PROPOFOL 10 MG/ML
INJECTION, EMULSION INTRAVENOUS AS NEEDED
Status: DISCONTINUED | OUTPATIENT
Start: 2024-09-03 | End: 2024-09-03

## 2024-09-03 RX ORDER — SIMETHICONE 80 MG
160 TABLET,CHEWABLE ORAL ONCE
Status: COMPLETED | OUTPATIENT
Start: 2024-09-03 | End: 2024-09-03

## 2024-09-03 RX ORDER — LIDOCAINE HCL/PF 100 MG/5ML
SYRINGE (ML) INTRAVENOUS AS NEEDED
Status: DISCONTINUED | OUTPATIENT
Start: 2024-09-03 | End: 2024-09-03

## 2024-09-03 RX ADMIN — LISINOPRIL 2.5 MG: 5 TABLET ORAL at 13:10

## 2024-09-03 RX ADMIN — PANTOPRAZOLE SODIUM 40 MG: 40 INJECTION, POWDER, FOR SOLUTION INTRAVENOUS at 13:10

## 2024-09-03 RX ADMIN — Medication 100 MG: at 13:10

## 2024-09-03 RX ADMIN — CYANOCOBALAMIN TAB 1000 MCG 1000 MCG: 1000 TAB at 13:10

## 2024-09-03 RX ADMIN — POLYETHYLENE GLYCOL-3350, SODIUM CHLORIDE, POTASSIUM CHLORIDE AND SODIUM BICARBONATE 2000 ML: 420; 11.2; 5.72; 1.48 POWDER, FOR SOLUTION ORAL at 01:57

## 2024-09-03 RX ADMIN — THIAMINE HCL TAB 100 MG 100 MG: 100 TAB at 13:10

## 2024-09-03 RX ADMIN — TOPIRAMATE 12.5 MG: 25 TABLET, FILM COATED ORAL at 13:10

## 2024-09-03 RX ADMIN — ROSUVASTATIN CALCIUM 10 MG: 10 TABLET, FILM COATED ORAL at 13:10

## 2024-09-03 RX ADMIN — SIMETHICONE 160 MG: 80 TABLET, CHEWABLE ORAL at 16:35

## 2024-09-03 SDOH — HEALTH STABILITY: MENTAL HEALTH: CURRENT SMOKER: 0

## 2024-09-03 ASSESSMENT — COGNITIVE AND FUNCTIONAL STATUS - GENERAL
DAILY ACTIVITIY SCORE: 24
MOBILITY SCORE: 24

## 2024-09-03 ASSESSMENT — PAIN SCALES - GENERAL
PAINLEVEL_OUTOF10: 0 - NO PAIN

## 2024-09-03 ASSESSMENT — PAIN - FUNCTIONAL ASSESSMENT
PAIN_FUNCTIONAL_ASSESSMENT: 0-10
PAIN_FUNCTIONAL_ASSESSMENT: 0-10

## 2024-09-03 NOTE — PROGRESS NOTES
Patient was able to finish all of the bowel prep solution earlier this a.m. It was noted that bowels were yellowish to clear. No bloody stools noted throughout the night.

## 2024-09-03 NOTE — ANESTHESIA POSTPROCEDURE EVALUATION
Patient: Tao Renee    Procedure Summary       Date: 09/03/24 Room / Location: [facility]    Anesthesia Start: 1056 Anesthesia Stop: 1147    Procedures:       EGD      COLONOSCOPY Diagnosis: Melena    Scheduled Providers: Levy Britton MD; Gael Ramirez MD Responsible Provider: Gael Ramirez MD    Anesthesia Type: MAC ASA Status: 3            Anesthesia Type: MAC    Vitals Value Taken Time   /78 09/03/24 1230   Temp 37.3 °C (99.1 °F) 09/03/24 1146   Pulse 65 09/03/24 1230   Resp 16 09/03/24 1230   SpO2 98 % 09/03/24 1243       Anesthesia Post Evaluation    Patient location during evaluation: PACU  Patient participation: complete - patient participated  Level of consciousness: awake and alert  Pain management: adequate  Airway patency: patent  Cardiovascular status: acceptable  Respiratory status: acceptable  Hydration status: acceptable  Postoperative Nausea and Vomiting: none        No notable events documented.     [facility]    Medicine Progress Note - Hospitalist Service    Date of Admission:  12/10/2023    Assessment & Plan   Mel Lazaro is a 89 year old female with Pmhx of CKD III, former tobacco dependence with possible COPD, dementia, HTN, hypothyroidism, who was admitted on 12/10/2023 after an unwitnessed fall presented with right hip pain. Found to have right femoral neck, acetabular and pubic rami fractures. Admitted for further cares with Orthopedic surgery consultation.         Fall, unwitnessed   Right femoral neck s/p ORIF 12/11  Acetabular and pubic rami fractures  Right Hip Effusion   Fell afternoon of 12/9, unwitnessed. Independent with ambulation at baseline. No preceding illness or known presyncopal symptoms prior to fall. She has assistance from her daughter's who take shifts caring for her in the home, when they are not there they have cameras to various points in the home for monitoring.  Brought in for right hip pain, difficulty mobilizing. Found to have right femoral neck, acetabular and right pubic rami fractures. Admitted for further cares with Orthopedic surgery consultation.   No hx of previous adverse issues with anesthesia, no known cardiopulmonary disease.    -Inpatient  -lower suspicion for rhabdo given was not down unassisted for long, added UA and CK for completeness   -Ortho consulted, surgery completed   -Diet as tolerated  -analgesia PRN  -bedrest, therapy and SW consults post op when appropriate for dispo planning. Anticipate LTCF.     Leukocytosis.  Unclear cause.  Check procalcitonin.  Repeat CBC in AM.  Send UA with micro reflex     Subacute L2 Compression Fracture   On imaging noted age indeterminate  superior endplate L2 compression fracture with 25% vertebral body height loss on CT Lumbar spine. Currently main area of pain right hip and lower extremity rather than back.  NCHCT, CT C spine negative.   History of previous back pain treated supportively, had  imaging that was negative at outside orthopedics per family.   - monitor for back pain  - analgesia PRN      CKD 3  Lab work with borderline mild hyponatremia, Cr 1.43 with GFR of 34. Last Cr 1.77 near baseline of ~1.6-1.8 when checked 2 years ago   Creatinine   Date Value Ref Range Status   12/13/2023 1.52 (H) 0.51 - 0.95 mg/dL Final   06/06/2017 1.00 0.52 - 1.04 mg/dL Final         -Encourage oral intake  -avoid nsaids for pain      Lack of dentition  -soft diet, aspiration precautions      Former tobacco dependence - no formal diagnosis of COPD but suspected in previous hospitalizations. No hypoxia or respiratory symptoms.      Alzheimer's disease: on aricept and zolofr. Pt still lives independently with rotating supervision from family.      Hypothyroidism:  Resumed synthroid     HLD:  Resumed zocor          Diet: Regular Diet Adult    DVT Prophylaxis: Defer to primary service  Garza Catheter: Not present  Lines: None     Cardiac Monitoring: None  Code Status: No CPR- Do NOT Intubate      Clinically Significant Risk Factors                                    Disposition Plan      Expected Discharge Date: 12/14/2023,  9:00 AM    Destination: inpatient rehabilitation facility  Discharge Comments: to [facility]            Rey Desai MD  Hospitalist Service  [facility]  Securely message with [employer] (more info)  Text page via [employer] Paging/Directory   ______________________________________________________________________    Interval History   Somnolent, appears to be oversedated.  Occasional moaning unclear if she is in pain.  Renal failure stable, suspect prerenal.  Continue IVF NS until a.m., repeat lab workup in AM. No nausea, vomiting, chest pain or shortness of breath. .    Physical Exam   Vital Signs: Temp: 97.3  F (36.3  C) Temp src: Temporal BP: (!) 167/66 Pulse: 73   Resp: 16 SpO2: 96 % O2 Device: None (Room air)    Weight: 0 lbs 0 oz    GEN: [place], somnolent, cooperates, appears  comfortable, NAD.  HEENT:  Normocephalic/atraumatic, no scleral icterus, no nasal discharge, mouth moist.  CV:  Regular rate and rhythm, no murmur or JVD.  S1 + S2 noted, no S3 or S4.  LUNGS:  Clear to auscultation bilaterally without rales/rhonchi/wheezing/retractions.  Symmetric chest rise on inhalation noted.  ABD:  Active bowel sounds, soft, non-tender/non-distended.  No rebound/guarding/rigidity.  EXT:  No edema or cyanosis.  No joint synovitis noted.  SKIN:  Dry to touch, no exanthems noted in the visualized areas.         Medical Decision Making     25 MINUTES SPENT BY ME on the date of service doing chart review, history, exam, documentation & further activities per the note.      Data     I have personally reviewed the following data over the past 24 hrs:    12.2 (H)  \   10.0 (L)   / 242     133 (L) 101 42.7 (H) /  88   4.8 26 1.52 (H) \

## 2024-09-03 NOTE — CARE PLAN
The patient's goals for the shift include  Patient would to not have any bleeding.    The clinical goals for the shift include Patient will prep for coloscopy      Problem: Pain - Adult  Goal: Verbalizes/displays adequate comfort level or baseline comfort level  Outcome: Progressing     Problem: Safety - Adult  Goal: Free from fall injury  Outcome: Progressing     Problem: Chronic Conditions and Co-morbidities  Goal: Patient's chronic conditions and co-morbidity symptoms are monitored and maintained or improved  Outcome: Progressing     Problem: Fall/Injury  Goal: Not fall by end of shift  Outcome: Progressing  Goal: Be free from injury by end of the shift  Outcome: Progressing  Goal: Verbalize understanding of personal risk factors for fall in the hospital  Outcome: Progressing  Goal: Verbalize understanding of risk factor reduction measures to prevent injury from fall in the home  Outcome: Progressing  Goal: Use assistive devices by end of the shift  Outcome: Progressing  Goal: Pace activities to prevent fatigue by end of the shift  Outcome: Progressing

## 2024-09-03 NOTE — DISCHARGE SUMMARY
Discharge Diagnosis  Melena, 2/2 internal hemorrhoids    Discharge Meds     Medication List      ASK your doctor about these medications     ALPRAZolam 0.5 mg tablet; Commonly known as: Xanax; Take 1 tablet (0.5   mg) by mouth as needed at bedtime for anxiety.   cyanocobalamin 1,000 mcg tablet; Commonly known as: Vitamin B-12   Flonase Allergy Relief 50 mcg/actuation nasal spray; Generic drug:   fluticasone   lisinopril 2.5 mg tablet; Take 1 tablet (2.5 mg) by mouth once daily.   magic mouthwash (lidocaine, diphenhydrAMINE, Maalox 1:1:1); Swish and   spit 10 mL every 6 hours if needed for mucositis.   mecobalam-folate-B6-B2-betain 1,000 mcg-3,400 mcg DFE-10 mg capsule   oxyCODONE 5 mg immediate release tablet; Commonly known as: Roxicodone;   Take 1 tablet (5 mg) by mouth every 6 hours if needed (pain).   pantoprazole 20 mg EC tablet; Commonly known as: ProtoNix; Take 1 tablet   (20 mg) by mouth once daily in the morning. Take before meals. Do not   crush, chew, or split.   prochlorperazine 10 mg tablet; Commonly known as: Compazine; Take 1   tablet (10 mg) by mouth every 6 hours if needed for nausea or vomiting.   rosuvastatin 10 mg tablet; Commonly known as: Crestor; Take 1 tablet (10   mg) by mouth once daily.   sildenafil 25 mg tablet; Commonly known as: Viagra   thiamine 100 mg tablet; Commonly known as: Vitamin B-1   topiramate 25 mg tablet; Commonly known as: Topamax; Take 0.5 tablets   (12.5 mg) by mouth once daily.   VITAMIN B-6 ORAL       Test Results Pending At Discharge  Pending Labs       Order Current Status    Surgical Pathology Exam In process            Hospital Course     The patient is a 74 y.o. male with signficant past medical history of right lung adenocarcinoma, hypertension, hyperlipidemia, SAURABH, diverticulosis, diverticulitis with bowel resection, pancreatic cyst, pulmonary nodules, anxiety, GERD, and BPH who presents for hematochezia. who presents to [facility]  on 8/31 with complaints of dark bloody stools for the past couple weeks .    In the ED vitals were stable, hemoglobin within normal limits, labs unremarkable. CT angio abdomen was performed negative for active bleed, there was diverticulosis and a cystic lesion in pancreatic tail. Ordered Protonix 40 mg IV twice daily, placed on clear liquid diet, n.p.o. after midnight.  Gastroenterology was consulted and performed EGD/colonoscopy on 9/3/2024 which showed one 25 mm flat Cristela IIc polyp in the cecum removed. Extensive diverticula of moderate severity in the ascending colon, transverse colon, descending colon and sigmoid colon; no bleeding was identified. Internal large (grade 3) hemorrhoids observed.    GI recommended follow-up in 1 month as outpatient.  Also recommended conservative management by encouraging high-fiber diet, drink plenty of water.  Additionally, patient instructed to receive outpatient MRI pancreas for further evaluation of pancreatic lesion.    The patient was medically stable for discharge on 9/3/2024        Pertinent Physical Exam At Time of Discharge  Physical Exam  General:  Pleasant and cooperative. No apparent distress.  HEENT:  Normocephalic, atraumatic  Chest:  Clear to auscultation bilaterally. No wheezes, rales, or rhonchi.  CV:  Regular rate and rhythm. No murmurs    Abdomen: Abdomen is soft, non-tender, non-distended. BS +   Extremities:  No lower extremity edema or cyanosis.   Neurological:  AAOx3. No focal deficits.  Skin:  Warm and dry.  Outpatient Follow-Up  Future Appointments   Date Time Provider Department Center   10/2/2024  9:45 AM PAR OPCTR PET CT PAROPCCT PAR RAD   10/2/2024 10:30 AM Stephan Bronson MD [id number] [facility]   1/28/2025  3:15 PM Rossi Chou MD [id number] [facility]         Jesus Garcia DO

## 2024-09-03 NOTE — ANESTHESIA PREPROCEDURE EVALUATION
Patient: Tao Renee    Procedure Information       Date/Time: 09/03/24 1100    Scheduled providers: Levy Britton MD; Gael Ramirez MD    Procedures:       EGD      COLONOSCOPY    Location: [facility]            Relevant Problems   Anesthesia (within normal limits)      Cardiac   (+) Arteriosclerosis of coronary artery   (+) HTN (hypertension)   (+) Hyperlipidemia      Pulmonary   (+) Adenocarcinoma of right lung (Multi)   (+) Obstructive sleep apnea syndrome   (+) Pulmonary nodules/lesions, multiple      Neuro   (+) Anxiety   (+) Peripheral neuropathy      GI   (+) Gastroesophageal reflux disease   (+) Rectal bleeding      /Renal   (+) BPH (benign prostatic hyperplasia)      Musculoskeletal   (+) Osteoarthritis of spine with radiculopathy, lumbar region       Clinical information reviewed:   Tobacco  Allergies  Meds   Med Hx  Surg Hx   Fam Hx          NPO Detail:  NPO/Void Status  Date of Last Liquid: 09/02/24  Time of Last Liquid: 2130  Date of Last Solid: 09/02/24  Time of Last Solid: 0800         Physical Exam    Airway  Mallampati: II  TM distance: >3 FB  Neck ROM: full     Cardiovascular - normal exam  Rhythm: regular  Rate: normal     Dental   (+) upper dentures     Pulmonary - normal exam     Abdominal      Other findings: Upper partial-pt stated that it is very secured and will not come out.          Anesthesia Plan    History of general anesthesia?: yes  History of complications of general anesthesia?: no    ASA 3     MAC     The patient is not a current smoker.    intravenous induction   Anesthetic plan and risks discussed with patient.    Plan discussed with [facility].

## 2024-09-03 NOTE — PERIOPERATIVE NURSING NOTE
Reviewed discharge instructions EGD & Colonoscopy, educated pt  on anesthesia safet-copies given in red anesthesia folder. All pre-op belongings with the pt.-pt wearing white metal chain--cell phone & small  device in black fabric case with pt

## 2024-09-03 NOTE — PROGRESS NOTES
Tao Renee is a 74 y.o. male on day 1 of admission presenting with Melena.      Subjective   Patient was seen and examined this morning.  No acute events overnight.  EGD and colonoscopy today.    Objective     Last Recorded Vitals  /78   Pulse 65   Temp 37.3 °C (99.1 °F) (Temporal)   Resp 16   Wt 105 kg (232 lb)   SpO2 98%   Intake/Output last 3 Shifts:    Intake/Output Summary (Last 24 hours) at 9/3/2024 1251  Last data filed at 9/3/2024 1140  Gross per 24 hour   Intake 4800 ml   Output --   Net 4800 ml       Admission Weight  Weight: 107 kg (235 lb) (08/31/24 1657)    Daily Weight  08/31/24 : 105 kg (232 lb)    Image Results  Colonoscopy Diagnostic  Table formatting from the original result was not included.  Impression  One 25 mm Cristela IIc polyp in the cecum; lift performed; removed in   piecemeal fashion by EMR  Extensive diverticulosis of moderate severity in the ascending colon,   transverse colon, descending colon and sigmoid colon  Healthy colocolonic anastomosis in the sigmoid colon  Large (grade 3) hemorrhoids likely the source of the current bleeding     Findings  One 25 mm flat Cristela IIc polyp in the cecum; lift performed with Eleview   injected into the submucosa; completely removed target lesion in piecemeal   fashion by EMR and retrieved specimen. EMR was performed with a cold   snare. Post-procedure bleeding was not visualized  Few medium, extensive diverticula of moderate severity in the ascending   colon, transverse colon, descending colon and sigmoid colon; no bleeding   was identified  Healthy colocolonic anastomosis in the sigmoid colon  Internal large (grade 3) hemorrhoids observed during retroflexion    Recommendation  Await pathology results   Repeat Colonoscopy in 6 months, due: 3/5/2025    To discharge home today          Indication  Melena    Staff  Staff Role   Levy Britton MD Proceduralist     Medications  See Anesthesia Record.     Preprocedure  A history and physical  has been performed, and patient medication   allergies have been reviewed. The patient's tolerance of previous   anesthesia has been reviewed. The risks and benefits of the procedure and   the sedation options and risks were discussed with the patient. All   questions were answered and informed consent obtained.    Details of the Procedure  The patient underwent monitored anesthesia care, which was administered by   an anesthesia professional. The patient's blood pressure, ECG, ETCO2,   heart rate, level of consciousness, oxygen and respirations were monitored   throughout the procedure. A digital rectal exam was performed. The scope   was introduced through the anus and advanced to the cecum. The quality of   bowel preparation was evaluated using the [place] Bowel Preparation Scale   with scores of: right colon = 3, transverse colon = 3, left colon = 2. The   total BBPS score was 8. Bowel prep was adequate. The patient experienced   no blood loss. The procedure was not difficult. The patient tolerated the   procedure well. There were no apparent adverse events.     Events  No data recorded    Specimens  Specimens        ID Type Source Tests Collected by Time    1 : COLD BX Tissue ESOPHAGUS DISTAL BIOPSY -SURGICAL PATHOLOGY EXAM Levy Britton MD 9/3/2024 1103         ESOPHAGITIS          2 : COLD SNARE Tissue COLON -CECUM POLYP -SURGICAL PATHOLOGY EXAM Levy Britton MD 9/3/2024 1116         INJECTED WITH ELEVIEW                      Procedure Location  [facility]  [address] [facility] [id number] [phone]    Referring Provider  KRISTYN Crouch-CNP    Procedure Provider  Levy Britton MD  Esophagogastroduodenoscopy (EGD)  Table formatting from the original result was not included.  Impression  Grade B esophagitis in the GE junction; performed cold forceps biopsy  Schatzki ring in the GE junction  Small type I hiatal hernia  The stomach and duodenum appeared  normal.    Findings  Grade B esophagitis with mucosal breaks measuring 5 mm or more not   continuous between folds, covering less than 75% of the circumference in   the GE junction (GE junction); performed cold forceps biopsy  Non-obstructing Schatzki ring in the GE junction (GE junction)  Small sliding hiatal hernia (type I hiatal hernia). Hill grade III hiatal   hernia  The stomach and duodenum appeared normal.    Recommendation  Await pathology results   Recommend Omeprazole 40 mg daily for esophagitis        Indication  Melena    Staff  Staff Role   Levy Britton MD Proceduralist     Medications  See Anesthesia Record.     Preprocedure  A history and physical has been performed, and patient medication   allergies have been reviewed. The patient's tolerance of previous   anesthesia has been reviewed. The risks and benefits of the procedure and   the sedation options and risks were discussed with the patient. All   questions were answered and informed consent obtained.    Details of the Procedure  The patient underwent monitored anesthesia care, which was administered by   an anesthesia professional. The patient's blood pressure, ECG, ETCO2,   heart rate, level of consciousness, oxygen and respirations were monitored   throughout the procedure. The scope was introduced through the mouth and   advanced to the second part of the duodenum. Retroflexion was performed in   the cardia. The patient experienced no blood loss. The procedure was not   difficult. The patient tolerated the procedure well. There were no   apparent adverse events.     Events  Procedure Events   Event Event Time   ENDO SCOPE IN TIME 9/3/2024 11:01 AM   ENDO SCOPE OUT TIME 9/3/2024 11:04 AM     Specimens  ID Type Source Tests Collected by Time   1 : COLD BX Tissue ESOPHAGUS DISTAL BIOPSY SURGICAL PATHOLOGY EXAM Levy Britton MD 9/3/2024 1103     Procedure Location  [facility]  [address] [facility]  [id number]  [phone]    Referring Provider  KRISTYN Crouch-CNP    Procedure Provider  Levy Britton MD      Physical Exam  Constitutional:       Appearance: Normal appearance.   HENT:      Head: Normocephalic.   Eyes:      Extraocular Movements: Extraocular movements intact.      Pupils: Pupils are equal, round, and reactive to light.   Cardiovascular:      Rate and Rhythm: Normal rate and regular rhythm.   Pulmonary:      Effort: Pulmonary effort is normal.      Breath sounds: Normal breath sounds.   Abdominal:      Palpations: Abdomen is soft.   Skin:     General: Skin is warm and dry.   Neurological:      General: No focal deficit present.      Mental Status: He is alert and oriented to person, place, and time.   Psychiatric:         Mood and Affect: Mood normal.         Behavior: Behavior normal.         Relevant Results               Assessment/Plan   The patient is a 74 y.o. male with signficant past medical history of right lung adenocarcinoma, hypertension, hyperlipidemia, SAURABH, diverticulosis, diverticulitis with bowel resection, pancreatic cyst, pulmonary nodules, anxiety, GERD, and BPH who presents for hematochezia.    9/3: EGD significant for grade B esophagitis and nonobstructing Schatzki ring in GE junction, stomach and duodenum normal.  Colonoscopy significant for extensive diverticulosis of moderate severity throughout colon, removal of polyp, internal large (grade 3) hemorrhoids.    Hematochezia secondary to large internal hemorrhoids  Colonic diverticulosis, s/p history of bowel resection for diverticulitis  Pancreatic cystic lesion  Grade B esophagitis  Non-obstructing Schatzki ring in GE junction  -CTA abdomen pelvis negative for acute GI bleed, shows stable cystic lesion and colonic diverticulosis  -Colonoscopy revealed internal hemorrhoids likely source of black stools  -Patient's hemoglobin within baseline, fecal alcohol blood test positive  -Recommend outpatient MRI pancreas  for further evaluation of pancreatic lesion  -Resume home dose PPI  -Will proceed with conservative management by encouraging high-fiber diet, drink plenty of water  -Patient stable for discharge from GI standpoint, GI signing off.    Dr. Tao Curtis   Internal Medicine PGY-1  Available on [employer] for any questions.    This is a preliminary note pending signature from the attending physician.

## 2024-09-03 NOTE — DISCHARGE INSTRUCTIONS
- Recommend high-fiber diet, drink plenty of water   - Recommend scheduling follow up with GI in 1 month  - Recommend MRI of your pancreas outpatient, as there was a small cyst in the tail of your pancreas seen on imaging

## 2024-09-03 NOTE — PROGRESS NOTES
Tao Renee is a 74 y.o. male on day 1 of admission presenting with Melena.      Assessment / Plan        Tao Renee is a 74 y.o. male with a past medical history of right lung adenocarcinoma s/p chemo and lobectomy, CAD, hypertension, hyperlipidemia, diverticulitis status post Coker, prior SBO who presents to [facility] on 8/31 with complaints of dark bloody stools     Daily progress  9/2/2024: Patient had bloody bowel movement at 10 AM this morning.  GI likely to perform colonoscopy tomorrow.  Hemoglobin is stable    9/3/2024: Colonoscopy performed this morning. Results: one 25 mm flat Cristela IIc polyp in the cecum removed. Extensive diverticula of moderate severity in the ascending colon, transverse colon, descending colon and sigmoid colon; no bleeding was identified. Internal large (grade 3) hemorrhoids observed     Acute Medical conditions  #Melena, likely due to internal hemorrhoids  #Diverticulosis  Plan:  -Type and screen  -Monitor hemoglobin  -Mylanta  -Protonix 40 mg IV twice daily  -Colonoscopy performed this morning. Results: one 25 mm flat Cristela IIc polyp in the cecum removed. Extensive diverticula of moderate severity in the ascending colon, transverse colon, descending colon and sigmoid colon; no bleeding was identified. Internal large (grade 3) hemorrhoids observed   -GI following.  Recommendations appreciated    # Pancreatic tail cystic lesion  Plan:  -MRI pancreas as outpatient    Chronic Medical conditions  Hypertension: Lisinopril 2.5 mg daily  Hyperlipidemia: Crestor 10 mg daily    DVT ppx: SCD  IVF: None  Diet: Regular  Code: Full  Consults: GI       Jesus Garcia DO   PGY-1, Internal Medicine  This is a preliminary note, please await attending attestation for final A/P    Subjective     Patient seen and examined. No acute overnight events.  Reports bowel movements yesterday were yellow in color. Does not complain of abdominal pain or bloody  "stools.      Objective       Physical Exam:  General:  Pleasant and cooperative. No apparent distress.  HEENT:  Normocephalic, atraumatic  Chest:  Clear to auscultation bilaterally. No wheezes, rales, or rhonchi.  CV:  Regular rate and rhythm. No murmurs    Abdomen: Abdomen is soft, non-tender, non-distended. BS +   Extremities:  No lower extremity edema or cyanosis.   Neurological:  AAOx3. No focal deficits.  Skin:  Warm and dry.    Last Recorded Vitals  Blood pressure 165/70, pulse 73, temperature 36 °C (96.8 °F), resp. rate 17, height 1.905 m (6' 3\"), weight 105 kg (232 lb), SpO2 97%.  Intake/Output last 3 Shifts:  I/O last 3 completed shifts:  In: 4000 (38 mL/kg) [P.O.:4000]  Out: - (0 mL/kg)   Weight: 105.2 kg     Last CBC & BMP  Lab Results   Component Value Date    GLUCOSE 99 09/03/2024    CALCIUM 9.1 09/03/2024     09/03/2024    K 4.6 09/03/2024    CO2 22 09/03/2024     (H) 09/03/2024    BUN 6 09/03/2024    CREATININE 1.09 09/03/2024     Lab Results   Component Value Date    WBC 6.9 09/03/2024    HGB 14.9 09/03/2024    HCT 46.8 09/03/2024    MCV 94 09/03/2024     09/03/2024          "

## 2024-09-03 NOTE — CARE PLAN
The patient's goals for the shift include  no rectal bleeding.    The clinical goals for the shift include patient will have decreased abdomen pain.    Over the shift, the patient did not make progress toward the following goals. Barriers to progression include GI bleed. Recommendations to address these barriers include consults and medication.

## 2024-09-04 ENCOUNTER — PATIENT OUTREACH (OUTPATIENT)
Dept: PRIMARY CARE | Facility: CLINIC | Age: 74
End: 2024-09-04
Payer: MEDICARE

## 2024-09-04 NOTE — PROGRESS NOTES
Discharge Facility: [facility]  Discharge Diagnosis: Melena, 2/2 internal hemorrhoids   Admission Date: 8/31/2024  Procedure Date: 9/3/2024 EGD/Colonoscopy  Discharge Date: 9/3/2024    PCP Appointment Date: 9/17/2024 09:45 AM  Specialist Appointment Date: GI recommended in one month  Hospital Encounter and Summary Linked: Yes  See discharge assessment below for further details    Medications  Medications reviewed with patient/caregiver?: Yes (9/4/2024 12:37 PM)  Prescription Comments: No new medications prescribed. (9/4/2024 12:37 PM)  Care Management Interventions: Provided patient education (9/4/2024 12:37 PM)  Medication Comments: Patient having mild abdominal discomfort since procedure. Advised taking Tylenol and/or simethicone if not passing gas. (9/4/2024 12:37 PM)    Appointments  Does the patient have a primary care provider?: Yes (9/4/2024 12:37 PM)  Care Management Interventions: Verified appointment date/time/provider (9/4/2024 12:37 PM)  Has the patient kept scheduled appointments due by today?: Not applicable (9/4/2024 12:37 PM)    Self Management  What is the home health agency?: N/A (9/4/2024 12:37 PM)  What Durable Medical Equipment (DME) was ordered?: N/A (9/4/2024 12:37 PM)    Patient Teaching  Does the patient have access to their discharge instructions?: Yes (9/4/2024 12:37 PM)  Care Management Interventions: Reviewed instructions with patient (9/4/2024 12:37 PM)  What is the patient's perception of their health status since discharge?: Improving (No further rectal bleeding. Patient concerned because he has not had a BM since discharge. Patient had an EGD/Colonoscopy yesterday. Advised it may take a day for bowels to move. Encouraged fluids and ambulation to assist with passing gas.) (9/4/2024 12:37 PM)  Is the patient/caregiver able to teach back the hierarchy of who to call/visit for symptoms/problems? PCP, Specialist, Home Health nurse, Urgent Care, ED, 911: Yes (9/4/2024 12:37  PM)  Patient/Caregiver Education Comments: Patient verbalized understanding of discharge instructions. Provided contact information for nonurgent questions or concerns. (9/4/2024 12:37 PM)    Wrap Up  Wrap Up Additional Comments: 74yoM with PMHx of right lung adenocarcinoma, hypertension, hyperlipidemia, SAURABH, diverticulosis, diverticulitis with bowel resection, pancreatic cyst, pulmonary nodules, anxiety, GERD, and BPH who presented for hematochezia. CT angio abdomen showed diverticulosis and a cystic lesion in pancreatic tail. Colonsccopy showed diverticula of moderate severity and large internal hemorrhoids. No active bleeding. Patient discharged to home self care. No new meds prescribed. Outpatient MRI of pancreas and GI follow up recommended. (9/4/2024 12:37 PM)

## 2024-09-05 ENCOUNTER — TELEPHONE (OUTPATIENT)
Dept: PRIMARY CARE | Facility: CLINIC | Age: 74
End: 2024-09-05
Payer: MEDICARE

## 2024-09-05 NOTE — TELEPHONE ENCOUNTER
LM to see if he wanted to move up his Hosp Disch Visit    ----- Message from Radha Claribellorin sent at 9/4/2024  1:07 PM EDT -----  Regarding: Hospital FU  Patient has an appt 9/17. Needs seen by 9/16 for TCM to be billable. Please move forward if possible. Thanks!    Discharge Facility: [facility]  Discharge Diagnosis: Melena, 2/2 internal hemorrhoids   Procedure Date: 9/3/2024 EGD/Colonoscopy  Discharge Date: 9/3/2024

## 2024-09-09 LAB
LABORATORY COMMENT REPORT: NORMAL
PATH REPORT.FINAL DX SPEC: NORMAL
PATH REPORT.GROSS SPEC: NORMAL
PATH REPORT.RELEVANT HX SPEC: NORMAL
PATH REPORT.TOTAL CANCER: NORMAL

## 2024-09-13 DIAGNOSIS — I10 HYPERTENSION, UNSPECIFIED TYPE: ICD-10-CM

## 2024-09-13 RX ORDER — TOPIRAMATE 25 MG/1
12.5 TABLET ORAL DAILY
Qty: 90 TABLET | Refills: 1 | Status: SHIPPED | OUTPATIENT
Start: 2024-09-13

## 2024-09-17 ENCOUNTER — APPOINTMENT (OUTPATIENT)
Dept: PRIMARY CARE | Facility: CLINIC | Age: 74
End: 2024-09-17
Payer: MEDICARE

## 2024-09-17 VITALS
WEIGHT: 231 LBS | BODY MASS INDEX: 28.87 KG/M2 | HEART RATE: 73 BPM | SYSTOLIC BLOOD PRESSURE: 138 MMHG | OXYGEN SATURATION: 97 % | DIASTOLIC BLOOD PRESSURE: 73 MMHG

## 2024-09-17 DIAGNOSIS — K21.9 GASTROESOPHAGEAL REFLUX DISEASE WITHOUT ESOPHAGITIS: ICD-10-CM

## 2024-09-17 DIAGNOSIS — I25.10 ARTERIOSCLEROSIS OF CORONARY ARTERY: Chronic | ICD-10-CM

## 2024-09-17 DIAGNOSIS — I10 PRIMARY HYPERTENSION: Chronic | ICD-10-CM

## 2024-09-17 DIAGNOSIS — K62.5 RECTAL BLEEDING: Primary | ICD-10-CM

## 2024-09-17 PROCEDURE — 1111F DSCHRG MED/CURRENT MED MERGE: CPT | Performed by: FAMILY MEDICINE

## 2024-09-17 PROCEDURE — 1157F ADVNC CARE PLAN IN RCRD: CPT | Performed by: FAMILY MEDICINE

## 2024-09-17 PROCEDURE — 3078F DIAST BP <80 MM HG: CPT | Performed by: FAMILY MEDICINE

## 2024-09-17 PROCEDURE — 1126F AMNT PAIN NOTED NONE PRSNT: CPT | Performed by: FAMILY MEDICINE

## 2024-09-17 PROCEDURE — 3075F SYST BP GE 130 - 139MM HG: CPT | Performed by: FAMILY MEDICINE

## 2024-09-17 PROCEDURE — 99496 TRANSJ CARE MGMT HIGH F2F 7D: CPT | Performed by: FAMILY MEDICINE

## 2024-09-17 PROCEDURE — 1159F MED LIST DOCD IN RCRD: CPT | Performed by: FAMILY MEDICINE

## 2024-09-17 PROCEDURE — 1036F TOBACCO NON-USER: CPT | Performed by: FAMILY MEDICINE

## 2024-09-17 ASSESSMENT — ENCOUNTER SYMPTOMS
RESPIRATORY NEGATIVE: 1
MUSCULOSKELETAL NEGATIVE: 1
CONSTITUTIONAL NEGATIVE: 1
GASTROINTESTINAL NEGATIVE: 1
CARDIOVASCULAR NEGATIVE: 1

## 2024-09-17 ASSESSMENT — PAIN SCALES - GENERAL: PAINLEVEL: 0-NO PAIN

## 2024-09-17 NOTE — PROGRESS NOTES
Subjective   Patient ID: Tao Renee is a 74 y.o. male who presents for Hospital Follow-up.  HPI  Sp hosp with gi bleed hemmorhoids  Going for prostate bx   Patient being seen for chief complaint being addressed we also needed to review patient's past medical history due to his complex medical problems we went over his significant other medical issues individually reviewed his medications and did an overview of his past labs.  And medications    Review of Systems   Constitutional: Negative.    HENT: Negative.     Respiratory: Negative.     Cardiovascular: Negative.    Gastrointestinal: Negative.    Genitourinary: Negative.    Musculoskeletal: Negative.        Objective   Physical Exam  Constitutional:       Appearance: Normal appearance.   HENT:      Head: Normocephalic.      Mouth/Throat:      Mouth: Mucous membranes are moist.   Eyes:      Extraocular Movements: Extraocular movements intact.      Pupils: Pupils are equal, round, and reactive to light.   Cardiovascular:      Rate and Rhythm: Normal rate and regular rhythm.   Pulmonary:      Effort: Pulmonary effort is normal.      Breath sounds: Normal breath sounds.   Skin:     General: Skin is warm and dry.   Neurological:      Mental Status: He is alert.         Assessment/Plan   Problem List Items Addressed This Visit    None           Rubio Tariq DO 09/17/24 10:39 AM

## 2024-09-18 ENCOUNTER — PATIENT OUTREACH (OUTPATIENT)
Dept: PRIMARY CARE | Facility: CLINIC | Age: 74
End: 2024-09-18
Payer: MEDICARE

## 2024-09-18 NOTE — PROGRESS NOTES
Unable to reach patient for call back after patient's follow up appointment with Dr. Tariq. LVM with call back number for patient to call if needed.

## 2024-09-30 NOTE — PROGRESS NOTES
"Subjective   Tao Renee  is a 74 y.o. male who presents for evaluation of recurrent right lung cancer status post robotic right upper lobe lobectomy and right lower lobe superior segmental resection on 1/22/24.     Briefly, this is a 72 male who presented to me with a pulmonary nodule. He underwent percutaneous biopsy and was found to have lung cancer. I recommended the patient undergo thoracoscopic lobectomy. This was performed on June 9, 2016 at [facility]. The patient had no intraoperative or postoperative complications.      In October 2019, I was concerned about a right-sided lung nodule which had changed in characteristic. I recommended a percutaneous lung biopsy, which was performed on 10/23/19. This was a difficult procedure and the results suggested only \"atypical\" findings. Subsequent imaging continued to be abnormal, and I recommended repeat percutaneous biopsy, and this was confirmed to be a new lipidic well-differentiated invasive adenocarcinoma. PET/CT 3/5/2020 suggested isolated cancer, and he was referred for ablative radiation therapy given his aversion to repeat surgery. The patient was treated with hypofractionated radiation from 4/21/2020 to 5/11/2020.  He was diagnosed with recurrent cancer in his right upper lung and had a lung nodule in his right lower lung.  I recommended surgical resection.  He was taken to the operating room on 1/22/2024 and underwent robotic right upper lobe lobectomy and right lower lobe superior segmental resection.  Postoperatively had an air leak, but this resolved. Based on his pathology result, medical oncology recommended adjuvant systemic treatment and is currently not receiving treatment.     Currently the patient is presenting for routine follow-up and in their usual state of health. He denies the following symptoms: chest pain, shortness of breath at rest, shortness of breath with activity, cough, hemoptysis, fevers, chills, and weight loss.  Recent " prostate biopsy and recent admission for GI bleed (s/p colonoscopy and EGD)         He  reports that he quit smoking about 23 years ago. His smoking use included cigarettes. He started smoking about 57 years ago. He has a 68 pack-year smoking history. He has never used smokeless tobacco. He reports current alcohol use of about 6.0 standard drinks of alcohol per week. He reports that he does not use drugs.    Objective   Physical Exam  The patient is well-appearing and in no acute distress. The trachea is midline and there is no crepitus. The lungs were clear to auscultation grossly. There was good effort and excursion. The heart had a regular rate and rhythm. The abdomen was soft, nontender and nondistended. The extremities had no edema or gross deformities. Mood and affect are appropriate.  Diagnostic Studies  I reviewed a CT chest performed recently. I do not see any new or concerning nodules or adenopathy    Left nodule is stable  Assessment/Plan   I believe that the patient is doing well.     Based on the patient's clinical presentation and my review of their radiographic imaging, I believe they have no evidence of cancer recurrence.  I recommend ongoing radiographic screening.    I recommend CT chest in 6 months    I discussed this in detail with the patient, including a discussion of alternatives. They were comfortable with this approach.     Stephan Bronson MD  [phone]

## 2024-10-02 ENCOUNTER — HOSPITAL ENCOUNTER (OUTPATIENT)
Dept: RADIOLOGY | Facility: CLINIC | Age: 74
Discharge: HOME | End: 2024-10-02
Payer: MEDICARE

## 2024-10-02 ENCOUNTER — OFFICE VISIT (OUTPATIENT)
Dept: SURGERY | Facility: CLINIC | Age: 74
End: 2024-10-02
Payer: MEDICARE

## 2024-10-02 VITALS
TEMPERATURE: 98.3 F | BODY MASS INDEX: 29.12 KG/M2 | OXYGEN SATURATION: 100 % | WEIGHT: 234.2 LBS | DIASTOLIC BLOOD PRESSURE: 76 MMHG | HEIGHT: 75 IN | HEART RATE: 77 BPM | SYSTOLIC BLOOD PRESSURE: 133 MMHG

## 2024-10-02 DIAGNOSIS — C34.91 ADENOCARCINOMA OF RIGHT LUNG (MULTI): Chronic | ICD-10-CM

## 2024-10-02 DIAGNOSIS — C34.91 ADENOCARCINOMA OF RIGHT LUNG (MULTI): Primary | ICD-10-CM

## 2024-10-02 DIAGNOSIS — C34.11 MALIGNANT NEOPLASM OF UPPER LOBE OF RIGHT LUNG (MULTI): ICD-10-CM

## 2024-10-02 PROCEDURE — 99214 OFFICE O/P EST MOD 30 MIN: CPT | Performed by: THORACIC SURGERY (CARDIOTHORACIC VASCULAR SURGERY)

## 2024-10-02 PROCEDURE — 1157F ADVNC CARE PLAN IN RCRD: CPT | Performed by: THORACIC SURGERY (CARDIOTHORACIC VASCULAR SURGERY)

## 2024-10-02 PROCEDURE — 1111F DSCHRG MED/CURRENT MED MERGE: CPT | Performed by: THORACIC SURGERY (CARDIOTHORACIC VASCULAR SURGERY)

## 2024-10-02 PROCEDURE — 71250 CT THORAX DX C-: CPT

## 2024-10-02 PROCEDURE — 1126F AMNT PAIN NOTED NONE PRSNT: CPT | Performed by: THORACIC SURGERY (CARDIOTHORACIC VASCULAR SURGERY)

## 2024-10-02 PROCEDURE — 3075F SYST BP GE 130 - 139MM HG: CPT | Performed by: THORACIC SURGERY (CARDIOTHORACIC VASCULAR SURGERY)

## 2024-10-02 PROCEDURE — 1159F MED LIST DOCD IN RCRD: CPT | Performed by: THORACIC SURGERY (CARDIOTHORACIC VASCULAR SURGERY)

## 2024-10-02 PROCEDURE — 3078F DIAST BP <80 MM HG: CPT | Performed by: THORACIC SURGERY (CARDIOTHORACIC VASCULAR SURGERY)

## 2024-10-02 ASSESSMENT — ENCOUNTER SYMPTOMS
OCCASIONAL FEELINGS OF UNSTEADINESS: 0
LOSS OF SENSATION IN FEET: 1
DEPRESSION: 0

## 2024-10-02 ASSESSMENT — PAIN SCALES - GENERAL: PAINLEVEL: 0-NO PAIN

## 2024-10-03 ENCOUNTER — PATIENT OUTREACH (OUTPATIENT)
Dept: PRIMARY CARE | Facility: CLINIC | Age: 74
End: 2024-10-03
Payer: MEDICARE

## 2024-10-09 ENCOUNTER — TELEPHONE (OUTPATIENT)
Dept: PRIMARY CARE | Facility: CLINIC | Age: 74
End: 2024-10-09

## 2024-10-09 NOTE — TELEPHONE ENCOUNTER
Pathology report came back from when patient was in hospital. He wanted to let you know that there were no signs of cancer.

## 2024-10-21 DIAGNOSIS — I10 HYPERTENSION, UNSPECIFIED TYPE: ICD-10-CM

## 2024-10-21 RX ORDER — LISINOPRIL 2.5 MG/1
2.5 TABLET ORAL DAILY
Qty: 90 TABLET | Refills: 1 | Status: SHIPPED | OUTPATIENT
Start: 2024-10-21

## 2024-11-11 ENCOUNTER — APPOINTMENT (OUTPATIENT)
Dept: PRIMARY CARE | Facility: CLINIC | Age: 74
End: 2024-11-11
Payer: MEDICARE

## 2024-11-12 ENCOUNTER — HOSPITAL ENCOUNTER (OUTPATIENT)
Dept: RADIOLOGY | Facility: CLINIC | Age: 74
Discharge: HOME | End: 2024-11-12
Payer: MEDICARE

## 2024-11-12 ENCOUNTER — APPOINTMENT (OUTPATIENT)
Dept: PRIMARY CARE | Facility: CLINIC | Age: 74
End: 2024-11-12
Payer: MEDICARE

## 2024-11-12 VITALS
WEIGHT: 236 LBS | DIASTOLIC BLOOD PRESSURE: 78 MMHG | HEIGHT: 75 IN | OXYGEN SATURATION: 95 % | SYSTOLIC BLOOD PRESSURE: 148 MMHG | BODY MASS INDEX: 29.34 KG/M2 | HEART RATE: 78 BPM

## 2024-11-12 DIAGNOSIS — I10 PRIMARY HYPERTENSION: Chronic | ICD-10-CM

## 2024-11-12 DIAGNOSIS — Z00.00 ROUTINE GENERAL MEDICAL EXAMINATION AT HEALTH CARE FACILITY: Primary | ICD-10-CM

## 2024-11-12 DIAGNOSIS — E78.2 MIXED HYPERLIPIDEMIA: Chronic | ICD-10-CM

## 2024-11-12 DIAGNOSIS — M54.50 LOW BACK PAIN WITHOUT SCIATICA, UNSPECIFIED BACK PAIN LATERALITY, UNSPECIFIED CHRONICITY: ICD-10-CM

## 2024-11-12 DIAGNOSIS — K29.70 GASTRITIS WITHOUT BLEEDING, UNSPECIFIED CHRONICITY, UNSPECIFIED GASTRITIS TYPE: ICD-10-CM

## 2024-11-12 DIAGNOSIS — I25.10 ARTERIOSCLEROSIS OF CORONARY ARTERY: Chronic | ICD-10-CM

## 2024-11-12 PROCEDURE — 72110 X-RAY EXAM L-2 SPINE 4/>VWS: CPT | Performed by: RADIOLOGY

## 2024-11-12 PROCEDURE — 72110 X-RAY EXAM L-2 SPINE 4/>VWS: CPT

## 2024-11-12 RX ORDER — SUCRALFATE 1 G/1
1 TABLET ORAL
Qty: 120 TABLET | Refills: 1 | Status: SHIPPED | OUTPATIENT
Start: 2024-11-12 | End: 2025-11-12

## 2024-11-12 ASSESSMENT — ACTIVITIES OF DAILY LIVING (ADL)
TAKING_MEDICATION: INDEPENDENT
DRESSING: INDEPENDENT
MANAGING_FINANCES: INDEPENDENT
BATHING: INDEPENDENT
GROCERY_SHOPPING: INDEPENDENT
DOING_HOUSEWORK: INDEPENDENT

## 2024-11-12 ASSESSMENT — ENCOUNTER SYMPTOMS
NEUROLOGICAL NEGATIVE: 1
COUGH: 1
GASTROINTESTINAL NEGATIVE: 1
CONSTITUTIONAL NEGATIVE: 1
CARDIOVASCULAR NEGATIVE: 1
MUSCULOSKELETAL NEGATIVE: 1

## 2024-11-12 NOTE — PROGRESS NOTES
Subjective   Patient ID: Tao Renee is a 74 y.o. male who presents for Medicare Annual Wellness Visit Subsequent.  HPI  Doing well  Patient being seen for chief complaint being addressed we also needed to review patient's past medical history due to his complex medical problems we went over his significant other medical issues individually reviewed his medications and did an overview of his past labs.  And medications  Discussed depression screen with patient for 5 min   Discussed living will and DNR with patient and spent 16 min doing so. Pts questions and concerns reviewed.   Review of Systems   Constitutional: Negative.    HENT: Negative.     Respiratory:  Positive for cough.    Cardiovascular: Negative.    Gastrointestinal: Negative.    Genitourinary: Negative.    Musculoskeletal: Negative.    Neurological: Negative.        Objective   Physical Exam  Constitutional:       Appearance: Normal appearance.   HENT:      Nose: Nose normal.      Mouth/Throat:      Mouth: Mucous membranes are moist.   Eyes:      Extraocular Movements: Extraocular movements intact.      Pupils: Pupils are equal, round, and reactive to light.   Cardiovascular:      Rate and Rhythm: Normal rate and regular rhythm.   Abdominal:      General: Abdomen is flat.   Skin:     General: Skin is warm and dry.   Neurological:      Mental Status: He is alert.   Patient with piriformis syndrome inject the left piriformis tendon at the insertion patient tolerated procedure well    Assessment/Plan   Problem List Items Addressed This Visit             ICD-10-CM    Arteriosclerosis of coronary artery (Chronic) I25.10    HTN (hypertension) (Chronic) I10    Relevant Orders    Comprehensive Metabolic Panel    Lipid Panel    Hyperlipidemia (Chronic) E78.5    Relevant Orders    Comprehensive Metabolic Panel    Lipid Panel     Other Visit Diagnoses         Codes    Routine general medical examination at health care facility    -  Primary Z00.00    Relevant  Orders    1 Year Follow Up In Primary Care - Wellness Exam    Low back pain without sciatica, unspecified back pain laterality, unspecified chronicity     M54.50    Relevant Orders    XR lumbar spine 2-3 views                 Rubio Tariq DO 11/12/24 11:25 AM

## 2024-12-02 ENCOUNTER — PATIENT OUTREACH (OUTPATIENT)
Dept: PRIMARY CARE | Facility: CLINIC | Age: 74
End: 2024-12-02
Payer: MEDICARE

## 2025-01-18 ENCOUNTER — LAB (OUTPATIENT)
Dept: LAB | Facility: LAB | Age: 75
End: 2025-01-18
Payer: MEDICARE

## 2025-01-18 DIAGNOSIS — I25.10 ARTERIOSCLEROSIS OF CORONARY ARTERY: Chronic | ICD-10-CM

## 2025-01-18 DIAGNOSIS — E78.2 MIXED HYPERLIPIDEMIA: Chronic | ICD-10-CM

## 2025-01-18 DIAGNOSIS — I10 PRIMARY HYPERTENSION: Chronic | ICD-10-CM

## 2025-01-18 PROCEDURE — 80061 LIPID PANEL: CPT

## 2025-01-18 PROCEDURE — 80053 COMPREHEN METABOLIC PANEL: CPT

## 2025-01-19 LAB
ALBUMIN SERPL BCP-MCNC: 4 G/DL (ref 3.4–5)
ALP SERPL-CCNC: 60 U/L (ref 33–136)
ALT SERPL W P-5'-P-CCNC: 11 U/L (ref 10–52)
ANION GAP SERPL CALC-SCNC: 12 MMOL/L (ref 10–20)
AST SERPL W P-5'-P-CCNC: 8 U/L (ref 9–39)
BILIRUB SERPL-MCNC: 0.9 MG/DL (ref 0–1.2)
BUN SERPL-MCNC: 11 MG/DL (ref 6–23)
CALCIUM SERPL-MCNC: 9.4 MG/DL (ref 8.6–10.6)
CHLORIDE SERPL-SCNC: 107 MMOL/L (ref 98–107)
CHOLEST SERPL-MCNC: 223 MG/DL (ref 0–199)
CHOLESTEROL/HDL RATIO: 4.7
CO2 SERPL-SCNC: 25 MMOL/L (ref 21–32)
CREAT SERPL-MCNC: 1.05 MG/DL (ref 0.5–1.3)
EGFRCR SERPLBLD CKD-EPI 2021: 74 ML/MIN/1.73M*2
GLUCOSE SERPL-MCNC: 96 MG/DL (ref 74–99)
HDLC SERPL-MCNC: 47.3 MG/DL
LDLC SERPL CALC-MCNC: 152 MG/DL
NON HDL CHOLESTEROL: 176 MG/DL (ref 0–149)
POTASSIUM SERPL-SCNC: 4.4 MMOL/L (ref 3.5–5.3)
PROT SERPL-MCNC: 6.8 G/DL (ref 6.4–8.2)
SODIUM SERPL-SCNC: 140 MMOL/L (ref 136–145)
TRIGL SERPL-MCNC: 121 MG/DL (ref 0–149)
VLDL: 24 MG/DL (ref 0–40)

## 2025-01-28 ENCOUNTER — APPOINTMENT (OUTPATIENT)
Dept: CARDIOLOGY | Facility: CLINIC | Age: 75
End: 2025-01-28
Payer: MEDICARE

## 2025-01-28 VITALS
BODY MASS INDEX: 29.75 KG/M2 | DIASTOLIC BLOOD PRESSURE: 76 MMHG | HEART RATE: 98 BPM | SYSTOLIC BLOOD PRESSURE: 118 MMHG | WEIGHT: 238 LBS | OXYGEN SATURATION: 99 %

## 2025-01-28 DIAGNOSIS — I10 PRIMARY HYPERTENSION: Chronic | ICD-10-CM

## 2025-01-28 DIAGNOSIS — Z09 HOSPITAL DISCHARGE FOLLOW-UP: ICD-10-CM

## 2025-01-28 DIAGNOSIS — E78.2 MIXED HYPERLIPIDEMIA: Chronic | ICD-10-CM

## 2025-01-28 DIAGNOSIS — I25.10 ARTERIOSCLEROSIS OF CORONARY ARTERY: Primary | Chronic | ICD-10-CM

## 2025-01-28 PROBLEM — M47.26 OSTEOARTHRITIS OF SPINE WITH RADICULOPATHY, LUMBAR REGION: Status: RESOLVED | Noted: 2023-09-06 | Resolved: 2025-01-28

## 2025-01-28 PROCEDURE — 99214 OFFICE O/P EST MOD 30 MIN: CPT | Performed by: INTERNAL MEDICINE

## 2025-01-28 PROCEDURE — 1036F TOBACCO NON-USER: CPT | Performed by: INTERNAL MEDICINE

## 2025-01-28 PROCEDURE — 3074F SYST BP LT 130 MM HG: CPT | Performed by: INTERNAL MEDICINE

## 2025-01-28 PROCEDURE — 1157F ADVNC CARE PLAN IN RCRD: CPT | Performed by: INTERNAL MEDICINE

## 2025-01-28 PROCEDURE — 1159F MED LIST DOCD IN RCRD: CPT | Performed by: INTERNAL MEDICINE

## 2025-01-28 PROCEDURE — 1160F RVW MEDS BY RX/DR IN RCRD: CPT | Performed by: INTERNAL MEDICINE

## 2025-01-28 PROCEDURE — 93005 ELECTROCARDIOGRAM TRACING: CPT | Performed by: INTERNAL MEDICINE

## 2025-01-28 PROCEDURE — 3078F DIAST BP <80 MM HG: CPT | Performed by: INTERNAL MEDICINE

## 2025-01-28 RX ORDER — ROSUVASTATIN CALCIUM 5 MG/1
5 TABLET, COATED ORAL DAILY
Qty: 90 TABLET | Refills: 3 | Status: SHIPPED | OUTPATIENT
Start: 2025-01-28 | End: 2026-01-28

## 2025-01-28 NOTE — PATIENT INSTRUCTIONS
1. CAD. Mildly elevated calcium score 15 Agatston units placing him in the 25th percentile.His stress test in 11/2019 was NL.  I have asked for him to restart his baby aspirin.  He is off his simvastatin.  I am asking him to try rosuvastatin 5 mg.  He has been very reluctant to try statins.     2. Hyperlipidemia. He ran out of the rosuva 5.   1/18/2025  HDL 47 triglycerides 121. I've reordered the rosuva 5 mg. FBW in 6 moths.     3. Thoracic aortic aneurysm measuring 3.85 cm.  Echo 1/20/2024 TAA 4.5 cm a formal we did repeat CTA of the chest in July along with an echo at the same time.  No aneurysm was seen.  No follow-up is needed.    4. Right middle lobe lung cancer status post lobectomy..  This was in 2018.  Had a recurrence of adenocarcinoma and underwent a resection of the right upper lobe January 2024.  Getting adjuvant chemotherapy at this time. He did 3 out of 4 rounds of chemo. He declined the last round. Because of hematuria    5. Hypertension.  Blood pressures are well-controlled    6.  Chemotherapy.  His oncologic echo reveals a normal strain index of -20%.  No thoracic aortic aneurysm is noted.    7.  GI bleeding.  Admitted in September with GI bleeding.    RTC 6 month. FBW in 6 months

## 2025-01-28 NOTE — PROGRESS NOTES
Referred by José Antonio PEREZ   I'm seeing Jeffrey for a 6 month follow up. Feeling ok. Noted [facility]MAN since his lobectomy 1 year ago. No edema. No CP.   Past Medical History:  Problem List Items Addressed This Visit    None     Past Medical History:   Diagnosis Date    Adenocarcinoma of right lung (Multi) 09/06/2023    Aneurysm of ascending aorta without rupture (CMS-HCC) 09/06/2023    Arteriosclerosis of coronary artery 09/06/2023    Elevated calcium score 18 Agatston units.(25th percentile)    Arthritis     BPH (benign prostatic hyperplasia)     GERD (gastroesophageal reflux disease)     H/O echocardiogram     Last Echo in 2018    HL (hearing loss)     HTN (hypertension) 09/06/2023    Hyperlipidemia 09/06/2023    Dr. Chou follows    Lung cancer (Multi)     recurrent lung cancer    Obstructive sleep apnea syndrome 01/22/2024    Shortness of breath     Sleep apnea     Spondyloarthropathy 09/06/2023    Strain of lumbar region 02/02/2024    Thoracic aortic aneurysm (TAA) (CMS-HCC) 09/06/2023    3.8 - 4 cm      Past Surgical History:  He has a past surgical history that includes Colonoscopy (10/14/2015); Small intestine surgery (10/14/2015); Other surgical history (06/22/2016); CT guided percutaneous biopsy lung (05/04/2016); CT guided percutaneous biopsy lung (10/23/2019); CT angio abdomen pelvis w and or wo IV IV contrast (01/05/2020); CT guided percutaneous biopsy lung (02/27/2020); Lung lobectomy (Right); and Bronchoscopy.      Social History:  He reports that he quit smoking about 24 years ago. His smoking use included cigarettes. He started smoking about 58 years ago. He has a 68 pack-year smoking history. He has never used smokeless tobacco. He reports current alcohol use of about 6.0 standard drinks of alcohol per week. He reports that he does not use drugs.    Family History:  Family History   Problem Relation Name Age of Onset    Hypertension Mother      Coronary artery disease Mother      Hypertension Father       Coronary artery disease Father      Heart attack Father       Allergies:  Atorvastatin and Azithromycin    Outpatient Medications:  Current Outpatient Medications   Medication Instructions    ALPRAZolam (XANAX) 0.5 mg, oral, Nightly PRN    cyanocobalamin (VITAMIN B-12) 1,000 mcg, oral, Daily    fluticasone (Flonase Allergy Relief) 50 mcg/actuation nasal spray 1 spray, Each Nostril, Daily PRN    lisinopril 2.5 mg, oral, Daily    magic mouthwash (lidocaine, diphenhydrAMINE, Maalox 1:1:1) 10 mL, Swish & Spit, Every 6 hours PRN    oxyCODONE (ROXICODONE) 5 mg, oral, Every 6 hours PRN    pantoprazole (PROTONIX) 20 mg, oral, Daily before breakfast, Do not crush, chew, or split.    prochlorperazine (COMPAZINE) 10 mg, oral, Every 6 hours PRN    pyridoxine HCl, vitamin B6, (VITAMIN B-6 ORAL) 100 mg, oral, Daily    rosuvastatin (CRESTOR) 10 mg, oral, Daily    sildenafil (VIAGRA) 25 mg, oral, 1 hour before needed    sucralfate (CARAFATE) 1 g, oral, 4 times daily before meals and nightly    thiamine (VITAMIN B-1) 100 mg, oral, Daily    topiramate (TOPAMAX) 12.5 mg, oral, Daily     Last Recorded Vitals:  There were no vitals filed for this visit.    Physical Exam  Patient is alert and oriented x3.  HEENT is unremarkable mucous members are moist  Neck no JVP no bruits upstrokes are full no thyromegaly  Lungs are clear bilaterally.  No wheezing crackles or rales  Heart regular rhythm normal S1-S2 there is no S3 no murmurs are heard.  Abdomen is soft bs are positive nontender nondistended no organomegaly no pulsatile masses  Extremities have no edema.  Distal pulses present palpable.  Neuro is grossly nonfocal  Skin has no rashes     Last Labs:  CBC -  Lab Results   Component Value Date    WBC 6.9 09/03/2024    HGB 14.9 09/03/2024    HCT 46.8 09/03/2024    MCV 94 09/03/2024     09/03/2024     CMP -  Lab Results   Component Value Date    CALCIUM 9.4 01/18/2025    PHOS 3.2 01/28/2024    PROT 6.8 01/18/2025    ALBUMIN 4.0  01/18/2025    AST 8 (L) 01/18/2025    ALT 11 01/18/2025    ALKPHOS 60 01/18/2025    BILITOT 0.9 01/18/2025     LIPID PANEL -   Lab Results   Component Value Date    CHOL 223 (H) 01/18/2025    HDL 47.3 01/18/2025    CHHDL 4.7 01/18/2025    VLDL 24 01/18/2025    TRIG 121 01/18/2025    NHDL 176 (H) 01/18/2025     RENAL FUNCTION PANEL -   Lab Results   Component Value Date    K 4.4 01/18/2025    PHOS 3.2 01/28/2024     Lab Results   Component Value Date    HGBA1C 5.6 01/09/2024     Procedure    Oncologic echo 7/17/2024 EF 60%, DDF, strain -20.1%    CTA chest 7/1/2024 no thoracic aortic aneurysm is noted right pleural effusion    Echo 1/16/2024 EF 60 to 65%, DDF, thoracic aorta 4.5 cm    CT [11/2/2020]; Appearance of chest is similar to prior exam. Marginal increase in R suprahilar density / bronchiectasis. Although prob fibrotic, underlying neoplastic cause cannot be excluded.     PET LUNG [3/5/2020]: Hypermetabolic RUL pulm nodule c/w known malignancy recurrence. No ev/of hypermetabolic local or distant mets.     PFT [3/16/2020]: Mild COPD w/mildly reduced DLCO      EX NST [12/3/2019]: 6 min 17 sec (7.40 METs) . . . Normal - ev/for diaphragmatic attenuation artifact. Normal segmental function on gated images with exercise EF 80%.      CT CHEST w/o contrast [10/16/19]: A 1.1 x 1.3 cm mixed ground-glass and solid RUL nodular opacity which appears stable in size but demonstrates an increased solid component compared to prior exam. Percutaneous bx recommended. Multiple addl bilat nodular opacities, stable. S/P right middle lobectomy w/o ev/of local tumor recurrence. Bilat upper lobe predominant emphysematous changes. Mod athero calcifications of thoracic aorta and coronary arteries. Multiple hepatic cysts, stable.      ECHO [11/21//2018]: LVSF normal, EF 60-65%, SD = impaired relax.     CT CHEST (7/18/2018) Interval stability of pulm nodules and area of R-upper lung ground glass attenuation. Mild centrilobular emphysema.      PVR (3/27/2018) Mild femoral popliteal occlusive disease, bi-lat lower extremities.      Chest CTA Triple R/O Â [04/07/2016]: Approx 1.8cm irreg nodular density in RML slightly inc from 1.4cm previously.     Carotid Duplex Â [07/06/2015]: 16-49% stenosis NAVEEN/LICA, nrml flow.     Calcium Score Â [02/19/2010]: Score of 0 Agatston units, multipile low-density lesions in liver otherwise negative study.        Assessment/Plan   1. CAD. Mildly elevated calcium score 15 Agatston units placing him in the 25th percentile.His stress test in 11/2019 was NL.  I have asked for him to restart his baby aspirin.  He is off his simvastatin.  I am asking him to try rosuvastatin 5 mg.  He has been very reluctant to try statins.     2. Hyperlipidemia. He ran out of the rosuva 5.   1/18/2025  HDL 47 triglycerides 121. I've reordered the rosuva 5 mg. FBW in 6 moths.     3. Thoracic aortic aneurysm measuring 3.85 cm.  Echo 1/20/2024 TAA 4.5 cm a formal we did repeat CTA of the chest in July along with an echo at the same time.  No aneurysm was seen.  No follow-up is needed.    4. Right middle lobe lung cancer status post lobectomy..  This was in 2018.  Had a recurrence of adenocarcinoma and underwent a resection of the right upper lobe January 2024.  Getting adjuvant chemotherapy at this time. He did 3 out of 4 rounds of chemo. He declined the last round. Because of hematuria    5. Hypertension.  Blood pressures are well-controlled    6.  Chemotherapy.  His oncologic echo reveals a normal strain index of -20%.  No thoracic aortic aneurysm is noted.    7.  GI bleeding.  Admitted in September with GI bleeding.    RTC 6 month. FBW in 6 months    Rossi Chou MD     Instructions and follow up

## 2025-03-01 ASSESSMENT — ENCOUNTER SYMPTOMS
CONSTITUTIONAL NEGATIVE: 1
CARDIOVASCULAR NEGATIVE: 1
MUSCULOSKELETAL NEGATIVE: 1
GASTROINTESTINAL NEGATIVE: 1
ALLERGIC/IMMUNOLOGIC NEGATIVE: 1
RESPIRATORY NEGATIVE: 1
HEMATOLOGIC/LYMPHATIC NEGATIVE: 1
PSYCHIATRIC NEGATIVE: 1
ENDOCRINE NEGATIVE: 1
NEUROLOGICAL NEGATIVE: 1
EYES NEGATIVE: 1

## 2025-03-01 NOTE — PROGRESS NOTES
Subjective     History of Present Illness:   Tao Renee is a 74 y.o. male who presents to GI clinic for colonoscopy. His recent colonoscopy from 9/3/2024 showed   One 25 mm Cristela IIc polyp in the cecum; lift performed; removed in piecemeal fashion by EMR  Extensive diverticulosis of moderate severity in the ascending colon, transverse colon, descending colon and sigmoid colon  Healthy colocolonic anastomosis in the sigmoid colon  Large (grade 3) hemorrhoids likely the source of the current bleeding     EGD from 9/3/2024 showed Grade B esophagitis in the GE junction; performed cold forceps biopsy. Schatzki ring in the GE junction and a small type I hiatal hernia. The stomach and duodenum appeared normal.    Biopsies showed TA rec to repeat in 6 months  Biopsies from EGD showed 25 EOS/HPF    Today, he mentions that he has had one episode of bloody stools in 9/2024 which prompted to obtain a colonoscopy and endoscopy. Since then, he has not had an episode. He takes pantoprazole as needed and adds that he has been on it for a long time. He denies any dysphagia or swallowing issues. He recalls one isolated episode of dysphagia when he was eating steak. He does not take any blood thinners.     Review of Systems  Review of Systems   Constitutional: Negative.    HENT: Negative.     Eyes: Negative.    Respiratory: Negative.     Cardiovascular: Negative.    Gastrointestinal: Negative.    Endocrine: Negative.    Genitourinary: Negative.    Musculoskeletal: Negative.    Skin: Negative.    Allergic/Immunologic: Negative.    Neurological: Negative.    Hematological: Negative.    Psychiatric/Behavioral: Negative.         Past Medical History   has a past medical history of Adenocarcinoma of right lung (Multi) (09/06/2023), Aneurysm of ascending aorta without rupture (CMS-HCC) (09/06/2023), Arteriosclerosis of coronary artery (09/06/2023), Arthritis, BPH (benign prostatic hyperplasia), GERD (gastroesophageal reflux disease),  "H/O echocardiogram, HL (hearing loss), HTN (hypertension) (09/06/2023), Hyperlipidemia (09/06/2023), Lung cancer (Multi), Obstructive sleep apnea syndrome (01/22/2024), Shortness of breath, Sleep apnea, Spondyloarthropathy (09/06/2023), Strain of lumbar region (02/02/2024), and Thoracic aortic aneurysm (TAA) (CMS-[facility]) (09/06/2023).     Social History   reports that he quit smoking about 24 years ago. His smoking use included cigarettes. He started smoking about 58 years ago. He has a 68 pack-year smoking history. He has never used smokeless tobacco. He reports current alcohol use of about 6.0 standard drinks of alcohol per week. He reports that he does not use drugs.     Family History  family history includes Coronary artery disease in his father and mother; Heart attack in his father; Hypertension in his father and mother.     Allergies  Allergies   Allergen Reactions    Atorvastatin Itching    Azithromycin Diarrhea       Medications  Current Outpatient Medications   Medication Instructions    ALPRAZolam (XANAX) 0.5 mg, oral, Nightly PRN    fluticasone (Flonase Allergy Relief) 50 mcg/actuation nasal spray 1 spray, Daily PRN    lisinopril 2.5 mg, oral, Daily    magic mouthwash (lidocaine, diphenhydrAMINE, Maalox 1:1:1) 10 mL, Swish & Spit, Every 6 hours PRN    oxyCODONE (ROXICODONE) 5 mg, oral, Every 6 hours PRN    pantoprazole (PROTONIX) 20 mg, oral, Daily before breakfast, Do not crush, chew, or split.    pyridoxine HCl, vitamin B6, (VITAMIN B-6 ORAL) 100 mg, Daily    rosuvastatin (CRESTOR) 5 mg, oral, Daily    sildenafil (VIAGRA) 25 mg    sucralfate (CARAFATE) 1 g, oral, 4 times daily before meals and nightly       Objective   /85 (BP Location: Right arm, Patient Position: Sitting)   Pulse 74   Ht 1.905 m (6' 3\")   Wt 111 kg (244 lb 3.2 oz)   BMI 30.52 kg/m²   Physical Exam  Constitutional:       Appearance: Normal appearance.   HENT:      Head: Normocephalic and atraumatic.      Right Ear: Tympanic " membrane normal.      Left Ear: Tympanic membrane normal.      Nose: Nose normal.      Mouth/Throat:      Mouth: Mucous membranes are moist.   Eyes:      Extraocular Movements: Extraocular movements intact.      Pupils: Pupils are equal, round, and reactive to light.   Cardiovascular:      Rate and Rhythm: Normal rate and regular rhythm.      Pulses: Normal pulses.      Heart sounds: Normal heart sounds.   Pulmonary:      Effort: Pulmonary effort is normal.      Breath sounds: Normal breath sounds.   Abdominal:      General: Abdomen is flat. Bowel sounds are normal. There is no distension.      Palpations: Abdomen is soft.      Tenderness: There is no abdominal tenderness.   Musculoskeletal:         General: Normal range of motion.      Cervical back: Normal range of motion and neck supple.   Skin:     General: Skin is warm and dry.   Neurological:      General: No focal deficit present.      Mental Status: He is alert and oriented to person, place, and time.   Psychiatric:         Mood and Affect: Mood normal.         Assessment/Plan   Tao Renee is a 74 y.o. male who presents to GI clinic for colonoscopy.    #Esophagitis  Remote dysphagia ?EOE vers GERD    His endoscopy revealed some inflammation in the esophagus and biopsy was positive for   eosinophils. So I have increased his pantoprazole to be taken everyday for the next 3 months.   I tried to have him take 40 mg but he says it gives him diarrhea     #Colon polyps s/p EMR   He has some diverticulosis so I recommended that he consume a diet high in lots of fruits and vegetables. In addition, I also advised him to avoid straining during his bowel movements.    We will have him scheduled for a colonoscopy to follow up on the large polyp from his last colonoscopy. I have given him  a rx of Suprep to be taken prior to the procedure. He will get this done at [facility].     By signing my name below, I, Iman Moreno , Luisibe attest that this documentation  has been prepared under the direction and in the presence of Levy Britton MD

## 2025-03-04 ENCOUNTER — APPOINTMENT (OUTPATIENT)
Dept: GASTROENTEROLOGY | Facility: CLINIC | Age: 75
End: 2025-03-04
Payer: MEDICARE

## 2025-03-04 VITALS
HEART RATE: 74 BPM | DIASTOLIC BLOOD PRESSURE: 85 MMHG | SYSTOLIC BLOOD PRESSURE: 155 MMHG | WEIGHT: 244.2 LBS | HEIGHT: 75 IN | BODY MASS INDEX: 30.36 KG/M2

## 2025-03-04 DIAGNOSIS — D12.6 COLON ADENOMA: ICD-10-CM

## 2025-03-04 DIAGNOSIS — K57.30 DIVERTICULOSIS OF COLON: Primary | ICD-10-CM

## 2025-03-04 DIAGNOSIS — K20.90 ESOPHAGITIS: ICD-10-CM

## 2025-03-04 PROCEDURE — 3077F SYST BP >= 140 MM HG: CPT | Performed by: INTERNAL MEDICINE

## 2025-03-04 PROCEDURE — 1157F ADVNC CARE PLAN IN RCRD: CPT | Performed by: INTERNAL MEDICINE

## 2025-03-04 PROCEDURE — 1036F TOBACCO NON-USER: CPT | Performed by: INTERNAL MEDICINE

## 2025-03-04 PROCEDURE — 1159F MED LIST DOCD IN RCRD: CPT | Performed by: INTERNAL MEDICINE

## 2025-03-04 PROCEDURE — 99214 OFFICE O/P EST MOD 30 MIN: CPT | Performed by: INTERNAL MEDICINE

## 2025-03-04 PROCEDURE — 1160F RVW MEDS BY RX/DR IN RCRD: CPT | Performed by: INTERNAL MEDICINE

## 2025-03-04 PROCEDURE — 3079F DIAST BP 80-89 MM HG: CPT | Performed by: INTERNAL MEDICINE

## 2025-03-04 RX ORDER — SODIUM, POTASSIUM,MAG SULFATES 17.5-3.13G
1 SOLUTION, RECONSTITUTED, ORAL ORAL 2 TIMES DAILY
Qty: 2 EACH | Refills: 0 | Status: SHIPPED | OUTPATIENT
Start: 2025-03-04

## 2025-03-26 NOTE — PROGRESS NOTES
"Subjective   Tao Renee  is a 74 y.o. male who presents for evaluation of recurrent right lung cancer status post robotic right upper lobe lobectomy and right lower lobe superior segmental resection on 1/22/24.     Briefly, this is a 72 male who presented to me with a pulmonary nodule. He underwent percutaneous biopsy and was found to have lung cancer. I recommended the patient undergo thoracoscopic lobectomy. This was performed on June 9, 2016 at [facility]. The patient had no intraoperative or postoperative complications.      In October 2019, I was concerned about a right-sided lung nodule which had changed in characteristic. I recommended a percutaneous lung biopsy, which was performed on 10/23/19. This was a difficult procedure and the results suggested only \"atypical\" findings. Subsequent imaging continued to be abnormal, and I recommended repeat percutaneous biopsy, and this was confirmed to be a new lipidic well-differentiated invasive adenocarcinoma. PET/CT 3/5/2020 suggested isolated cancer, and he was referred for ablative radiation therapy given his aversion to repeat surgery. The patient was treated with hypofractionated radiation from 4/21/2020 to 5/11/2020.  He was diagnosed with recurrent cancer in his right upper lung and had a lung nodule in his right lower lung.  I recommended surgical resection.  He was taken to the operating room on 1/22/2024 and underwent robotic right upper lobe lobectomy and right lower lobe superior segmental resection.  Postoperatively had an air leak, but this resolved. Based on his pathology result, medical oncology recommended adjuvant systemic treatment and is currently not receiving treatment.     Currently the patient is presenting for routine follow-up and in their usual state of health. He denies the following symptoms: chest pain, shortness of breath at rest, shortness of breath with activity, hemoptysis, fevers, chills, and weight loss.      There have " been no significant changes to their documented medical, surgical and family history.     He  reports that he quit smoking about 24 years ago. His smoking use included cigarettes. He started smoking about 58 years ago. He has a 68 pack-year smoking history. He has never used smokeless tobacco. He reports current alcohol use of about 6.0 standard drinks of alcohol per week. He reports that he does not use drugs.    Objective   Physical Exam  The patient is well-appearing and in no acute distress. The trachea is midline and there is no crepitus. The lungs were clear to auscultation grossly. There was good effort and excursion. The heart had a regular rate and rhythm. The abdomen was soft, nontender and nondistended. The extremities had no edema or gross deformities. Mood and affect are appropriate.  Diagnostic Studies  I reviewed a CT chest performed recently. I do not see any new or concerning nodules or adenopathy    Assessment/Plan   I believe that the patient is doing well.     Based on the patient's clinical presentation and my review of their radiographic imaging, I believe the left sided lung nodules are unlikely to represent a malignant process.  We discussed various management strategies including surgery, biopsy, and observation.  Based on this discussion, the patient elected for observational management.  I recommend serial radiographic surveillance.    Based on the patient's clinical presentation and my review of their radiographic imaging, I believe they have no evidence of cancer recurrence.  I recommend ongoing radiographic screening.    I recommend CT chest in 6 months    I discussed this in detail with the patient, including a discussion of alternatives. They were comfortable with this approach.     Stephan Bronson MD  [phone]

## 2025-04-02 ENCOUNTER — HOSPITAL ENCOUNTER (OUTPATIENT)
Dept: RADIOLOGY | Facility: CLINIC | Age: 75
Discharge: HOME | End: 2025-04-02
Payer: MEDICARE

## 2025-04-02 ENCOUNTER — OFFICE VISIT (OUTPATIENT)
Dept: SURGERY | Facility: CLINIC | Age: 75
End: 2025-04-02
Payer: MEDICARE

## 2025-04-02 VITALS
DIASTOLIC BLOOD PRESSURE: 84 MMHG | BODY MASS INDEX: 30.54 KG/M2 | TEMPERATURE: 98.1 F | WEIGHT: 245.6 LBS | OXYGEN SATURATION: 100 % | HEIGHT: 75 IN | HEART RATE: 72 BPM | SYSTOLIC BLOOD PRESSURE: 148 MMHG

## 2025-04-02 DIAGNOSIS — C34.91 ADENOCARCINOMA OF RIGHT LUNG (MULTI): Primary | ICD-10-CM

## 2025-04-02 DIAGNOSIS — C34.91 MALIGNANT NEOPLASM OF RIGHT LUNG, UNSPECIFIED PART OF LUNG (MULTI): ICD-10-CM

## 2025-04-02 DIAGNOSIS — R91.8 PULMONARY NODULES/LESIONS, MULTIPLE: ICD-10-CM

## 2025-04-02 DIAGNOSIS — C34.11 MALIGNANT NEOPLASM OF UPPER LOBE OF RIGHT LUNG (MULTI): ICD-10-CM

## 2025-04-02 PROCEDURE — 1126F AMNT PAIN NOTED NONE PRSNT: CPT | Performed by: THORACIC SURGERY (CARDIOTHORACIC VASCULAR SURGERY)

## 2025-04-02 PROCEDURE — 1036F TOBACCO NON-USER: CPT | Performed by: THORACIC SURGERY (CARDIOTHORACIC VASCULAR SURGERY)

## 2025-04-02 PROCEDURE — 3079F DIAST BP 80-89 MM HG: CPT | Performed by: THORACIC SURGERY (CARDIOTHORACIC VASCULAR SURGERY)

## 2025-04-02 PROCEDURE — 99214 OFFICE O/P EST MOD 30 MIN: CPT | Performed by: THORACIC SURGERY (CARDIOTHORACIC VASCULAR SURGERY)

## 2025-04-02 PROCEDURE — 71250 CT THORAX DX C-: CPT

## 2025-04-02 PROCEDURE — 3077F SYST BP >= 140 MM HG: CPT | Performed by: THORACIC SURGERY (CARDIOTHORACIC VASCULAR SURGERY)

## 2025-04-02 PROCEDURE — 99214 OFFICE O/P EST MOD 30 MIN: CPT | Mod: 25 | Performed by: THORACIC SURGERY (CARDIOTHORACIC VASCULAR SURGERY)

## 2025-04-02 PROCEDURE — 1157F ADVNC CARE PLAN IN RCRD: CPT | Performed by: THORACIC SURGERY (CARDIOTHORACIC VASCULAR SURGERY)

## 2025-04-02 PROCEDURE — 1159F MED LIST DOCD IN RCRD: CPT | Performed by: THORACIC SURGERY (CARDIOTHORACIC VASCULAR SURGERY)

## 2025-04-02 RX ORDER — THIAMINE HCL 50 MG
50 TABLET ORAL DAILY
COMMUNITY

## 2025-04-02 ASSESSMENT — ENCOUNTER SYMPTOMS
LOSS OF SENSATION IN FEET: 1
OCCASIONAL FEELINGS OF UNSTEADINESS: 0
DEPRESSION: 0

## 2025-04-02 ASSESSMENT — PAIN SCALES - GENERAL: PAINLEVEL_OUTOF10: 0-NO PAIN

## 2025-04-26 ENCOUNTER — ANESTHESIA EVENT (OUTPATIENT)
Dept: GASTROENTEROLOGY | Facility: HOSPITAL | Age: 75
End: 2025-04-26
Payer: MEDICARE

## 2025-04-28 ENCOUNTER — HOSPITAL ENCOUNTER (OUTPATIENT)
Dept: GASTROENTEROLOGY | Facility: HOSPITAL | Age: 75
Discharge: HOME | End: 2025-04-28
Payer: MEDICARE

## 2025-04-28 ENCOUNTER — ANESTHESIA (OUTPATIENT)
Dept: GASTROENTEROLOGY | Facility: HOSPITAL | Age: 75
End: 2025-04-28
Payer: MEDICARE

## 2025-04-28 VITALS
BODY MASS INDEX: 30.43 KG/M2 | WEIGHT: 244.71 LBS | DIASTOLIC BLOOD PRESSURE: 77 MMHG | SYSTOLIC BLOOD PRESSURE: 153 MMHG | TEMPERATURE: 97.7 F | HEIGHT: 75 IN | HEART RATE: 69 BPM | RESPIRATION RATE: 20 BRPM | OXYGEN SATURATION: 100 %

## 2025-04-28 DIAGNOSIS — D12.6 COLON ADENOMA: ICD-10-CM

## 2025-04-28 PROCEDURE — 3700000001 HC GENERAL ANESTHESIA TIME - INITIAL BASE CHARGE

## 2025-04-28 PROCEDURE — 3700000002 HC GENERAL ANESTHESIA TIME - EACH INCREMENTAL 1 MINUTE

## 2025-04-28 PROCEDURE — 45385 COLONOSCOPY W/LESION REMOVAL: CPT | Performed by: INTERNAL MEDICINE

## 2025-04-28 PROCEDURE — 7100000009 HC PHASE TWO TIME - INITIAL BASE CHARGE

## 2025-04-28 PROCEDURE — 2500000004 HC RX 250 GENERAL PHARMACY W/ HCPCS (ALT 636 FOR OP/ED): Mod: JZ | Performed by: ANESTHESIOLOGIST ASSISTANT

## 2025-04-28 PROCEDURE — 7100000010 HC PHASE TWO TIME - EACH INCREMENTAL 1 MINUTE

## 2025-04-28 PROCEDURE — 45381 COLONOSCOPY SUBMUCOUS NJX: CPT | Performed by: INTERNAL MEDICINE

## 2025-04-28 PROCEDURE — 2720000007 HC OR 272 NO HCPCS

## 2025-04-28 RX ORDER — LIDOCAINE HCL/PF 100 MG/5ML
SYRINGE (ML) INTRAVENOUS AS NEEDED
Status: DISCONTINUED | OUTPATIENT
Start: 2025-04-28 | End: 2025-04-28

## 2025-04-28 RX ORDER — PROPOFOL 10 MG/ML
INJECTION, EMULSION INTRAVENOUS AS NEEDED
Status: DISCONTINUED | OUTPATIENT
Start: 2025-04-28 | End: 2025-04-28

## 2025-04-28 RX ORDER — SODIUM CHLORIDE 0.9 % (FLUSH) 0.9 %
SYRINGE (ML) INJECTION AS NEEDED
Status: DISCONTINUED | OUTPATIENT
Start: 2025-04-28 | End: 2025-04-28

## 2025-04-28 RX ADMIN — Medication 10 ML: at 13:41

## 2025-04-28 RX ADMIN — PROPOFOL 120 MCG/KG/MIN: 10 INJECTION, EMULSION INTRAVENOUS at 13:16

## 2025-04-28 RX ADMIN — LIDOCAINE HYDROCHLORIDE 100 MG: 20 INJECTION, SOLUTION INTRAVENOUS at 13:15

## 2025-04-28 RX ADMIN — PROPOFOL 100 MG: 10 INJECTION, EMULSION INTRAVENOUS at 13:15

## 2025-04-28 SDOH — HEALTH STABILITY: MENTAL HEALTH: CURRENT SMOKER: 0

## 2025-04-28 ASSESSMENT — PAIN SCALES - GENERAL
PAINLEVEL_OUTOF10: 0 - NO PAIN
PAINLEVEL_OUTOF10: 0 - NO PAIN
PAIN_LEVEL: 0
PAINLEVEL_OUTOF10: 0 - NO PAIN
PAINLEVEL_OUTOF10: 0 - NO PAIN

## 2025-04-28 ASSESSMENT — PAIN - FUNCTIONAL ASSESSMENT: PAIN_FUNCTIONAL_ASSESSMENT: 0-10

## 2025-04-28 NOTE — ANESTHESIA POSTPROCEDURE EVALUATION
Patient: Tao Renee    Procedure Summary       Date: 04/28/25 Room / Location: [facility] [place]    Anesthesia Start: 1310 Anesthesia Stop: 1348    Procedure: COLONOSCOPY Diagnosis: Colon adenoma    Scheduled Providers: Levy Britton MD Responsible Provider: Ezekiel Dennis MD    Anesthesia Type: MAC ASA Status: 3            Anesthesia Type: MAC    Vitals Value Taken Time   /56 04/28/25 13:47   Temp 36.5 °C (97.7 °F) 04/28/25 13:47   Pulse 74 04/28/25 13:50   Resp 16 04/28/25 13:47   SpO2 95 % 04/28/25 13:50   Vitals shown include unfiled device data.    Anesthesia Post Evaluation    Patient location during evaluation: PACU  Patient participation: waiting for patient participation  Level of consciousness: sleepy but conscious  Pain score: 0  Pain management: adequate  Airway patency: patent  Cardiovascular status: acceptable and hemodynamically stable  Respiratory status: acceptable and face mask  Hydration status: acceptable  Postoperative Nausea and Vomiting: none        No notable events documented.

## 2025-04-28 NOTE — H&P
Procedure H&P    Patient Profile-Procedures  Name Tao Renee  Date of Birth 1950  MRN [id number]  Address   [address] [facility] [id number] [facility] [address]    Primary Phone Number [phone]  Secondary Phone Number    Rubio Lerma    Procedure(s):  Procedures: Colonoscopy  Primary contact name and number   Extended Emergency Contact Information  Primary Emergency Contact: OK DIAS  Address: [address] [place] [address] [place] [address] [facility]  Home Phone: [phone]  Work Phone: [phone]  Mobile Phone: [phone]  Relation: Sister   needed? No    General Health  Weight   Vitals:    04/28/25 1215   Weight: 111 kg (244 lb 11.4 oz)     BMI Body mass index is 30.59 kg/m².    Allergies  RX Allergies[1]    Past Medical History   Medical History[2]    Provider assessment  Diagnosis: Colon Cancer Screening/Surveillance   Medication Reviewed - yes  Prior to Admission medications    Medication Sig Start Date End Date Taking? Authorizing Provider   lisinopril 2.5 mg tablet Take 1 tablet (2.5 mg) by mouth once daily. 10/21/24  Yes Rubio Tariq DO   ALPRAZolam (Xanax) 0.5 mg tablet Take 1 tablet (0.5 mg) by mouth as needed at bedtime for anxiety. 8/2/24   Rubio Tariq DO   fluticasone (Flonase Allergy Relief) 50 mcg/actuation nasal spray Administer 1 spray into each nostril once daily as needed.    Historical Provider, MD   pantoprazole (ProtoNix) 20 mg EC tablet Take 1 tablet (20 mg) by mouth once daily in the morning. Take before meals. Do not crush, chew, or split. 6/20/24 6/20/25  Rubio Tarqi,    pyridoxine HCl, vitamin B6, (VITAMIN B-6 ORAL) Take 100 mg by mouth once daily.    Historical Provider, MD   rosuvastatin (Crestor) 5 mg tablet Take 1 tablet (5 mg) by mouth once daily. 1/28/25 1/28/26  Rossi Chou MD   sildenafil (Viagra) 25 mg tablet Take 1 tablet (25 mg) by mouth. 1 hour before needed    Historical  Provider, MD   sucralfate (Carafate) 1 gram tablet Take 1 tablet (1 g) by mouth 4 times a day before meals. 11/12/24 11/12/25  Rubio Tariq DO   thiamine (Vitamin B-1) 50 mg tablet Take 1 tablet (50 mg) by mouth once daily.    Historical Provider, MD   sodium,potassium,mag sulfates (Suprep) 17.5-3.13-1.6 gram solution Take 1 bottle by mouth 2 times a day. 3/4/25 4/28/25  Levy Britton MD       Physical Exam  Vitals:    04/28/25 1215   BP: 145/71   Pulse: 84   Resp: 18   Temp: 36.4 °C (97.5 °F)   SpO2: 100%        General: A&Ox3, NAD.  HEENT: AT/NC.   CV: RRR. No murmur.  Resp: CTA bilaterally. No wheezing, rhonchi or rales.   GI: Soft, NT/ND. BSx4.  Extrem: No edema. Pulses intact.  Neuro: No focal deficits.   Psych: Normal mood and affect.      Procedure Plan - pre-procedural (re)assesment completed by physician:  discharge/transfer patient when discharge criteria met    Levy Britton MD  4/28/2025 1:12 PM           [1]   Allergies  Allergen Reactions    Atorvastatin Itching    Azithromycin Diarrhea   [2]   Past Medical History:  Diagnosis Date    Adenocarcinoma of right lung (Multi) 09/06/2023    Aneurysm of ascending aorta without rupture 09/06/2023    Arteriosclerosis of coronary artery 09/06/2023    Elevated calcium score 18 Agatston units.(25th percentile)    Arthritis     BPH (benign prostatic hyperplasia)     GERD (gastroesophageal reflux disease)     H/O echocardiogram     Last Echo in 2018    HL (hearing loss)     HTN (hypertension) 09/06/2023    Hyperlipidemia 09/06/2023    Dr. Chou follows    Lung cancer (Multi)     recurrent lung cancer    Obstructive sleep apnea syndrome 01/22/2024    Shortness of breath     Sleep apnea     Spondyloarthropathy 09/06/2023    Strain of lumbar region 02/02/2024    Thoracic aortic aneurysm (TAA) 09/06/2023    3.8 - 4 cm

## 2025-04-28 NOTE — ANESTHESIA PREPROCEDURE EVALUATION
Patient: Tao Renee    Procedure Information       Date/Time: 04/28/25 1300    Scheduled providers: Levy Britton MD    Procedure: COLONOSCOPY    Location: [facility]            Relevant Problems   Cardiac   (+) Arteriosclerosis of coronary artery   (+) HTN (hypertension)   (+) Hyperlipidemia      Pulmonary   (+) Adenocarcinoma of right lung (Multi)   (+) Pulmonary nodules/lesions, multiple      Neuro   (+) Anxiety   (+) Peripheral neuropathy      GI   (+) Gastroesophageal reflux disease   (+) Rectal bleeding      /Renal   (+) BPH (benign prostatic hyperplasia)       Clinical information reviewed:    Allergies   Problems              NPO Detail:  NPO/Void Status  Date of Last Liquid: 04/28/25  Time of Last Liquid: 0800  Date of Last Solid: 04/27/25  Time of Last Solid: 0800         Physical Exam    Airway  Mallampati: II  TM distance: >3 FB  Neck ROM: full  Mouth opening: 3 or more finger widths     Cardiovascular - normal exam  Rhythm: regular  Rate: normal     Dental - normal exam     Pulmonary Breath sounds clear to auscultation  (+) decreased breath sounds     Abdominal            Anesthesia Plan    History of general anesthesia?: yes  History of complications of general anesthesia?: no    ASA 3     MAC     The patient is not a current smoker.  Education provided regarding risk of obstructive sleep apnea.  intravenous induction   Anesthetic plan and risks discussed with patient.    Plan discussed with CRNA.

## 2025-06-27 DIAGNOSIS — I10 HYPERTENSION, UNSPECIFIED TYPE: ICD-10-CM

## 2025-06-27 DIAGNOSIS — F41.9 ANXIETY: ICD-10-CM

## 2025-06-27 RX ORDER — ALPRAZOLAM 0.5 MG/1
0.5 TABLET ORAL NIGHTLY PRN
Qty: 45 TABLET | Refills: 1 | Status: SHIPPED | OUTPATIENT
Start: 2025-06-27

## 2025-06-27 NOTE — TELEPHONE ENCOUNTER
Rx Refill Request Telephone Encounter    Name:  Tao Renee  :  [id number]  Medication Name: Alprazolam 0.5 MG        #45 Refill: 1  Take 1 tablet daily at bedtime as needed  Specific Pharmacy location: [facility] [place]  Date of last appointment: 24  Date of next appointment:   Best number to reach patient:

## 2025-07-28 DIAGNOSIS — E78.2 MIXED HYPERLIPIDEMIA: Chronic | ICD-10-CM

## 2025-07-31 ENCOUNTER — APPOINTMENT (OUTPATIENT)
Dept: CARDIOLOGY | Facility: CLINIC | Age: 75
End: 2025-07-31
Payer: MEDICARE

## 2025-08-20 PROBLEM — K62.5 RECTAL BLEEDING: Status: RESOLVED | Noted: 2024-09-02 | Resolved: 2025-08-20

## 2025-08-20 PROBLEM — R10.9 ABDOMINAL PAIN: Status: RESOLVED | Noted: 2023-12-29 | Resolved: 2025-08-20

## 2025-08-20 PROBLEM — E66.811 CLASS 1 OBESITY WITH BODY MASS INDEX (BMI) OF 30.0 TO 30.9 IN ADULT: Status: ACTIVE | Noted: 2025-08-20

## 2025-08-21 ENCOUNTER — OFFICE VISIT (OUTPATIENT)
Dept: CARDIOLOGY | Facility: CLINIC | Age: 75
End: 2025-08-21
Payer: MEDICARE

## 2025-08-21 VITALS
SYSTOLIC BLOOD PRESSURE: 122 MMHG | OXYGEN SATURATION: 99 % | WEIGHT: 235 LBS | BODY MASS INDEX: 29.37 KG/M2 | DIASTOLIC BLOOD PRESSURE: 78 MMHG | HEART RATE: 85 BPM

## 2025-08-21 DIAGNOSIS — K21.9 GASTROESOPHAGEAL REFLUX DISEASE WITHOUT ESOPHAGITIS: ICD-10-CM

## 2025-08-21 DIAGNOSIS — E78.2 MIXED HYPERLIPIDEMIA: Chronic | ICD-10-CM

## 2025-08-21 DIAGNOSIS — I25.10 ARTERIOSCLEROSIS OF CORONARY ARTERY: Primary | Chronic | ICD-10-CM

## 2025-08-21 DIAGNOSIS — I10 PRIMARY HYPERTENSION: Chronic | ICD-10-CM

## 2025-08-21 PROCEDURE — 99212 OFFICE O/P EST SF 10 MIN: CPT

## 2025-08-21 PROCEDURE — 3078F DIAST BP <80 MM HG: CPT | Performed by: INTERNAL MEDICINE

## 2025-08-21 PROCEDURE — 99214 OFFICE O/P EST MOD 30 MIN: CPT | Performed by: INTERNAL MEDICINE

## 2025-08-21 PROCEDURE — 1159F MED LIST DOCD IN RCRD: CPT | Performed by: INTERNAL MEDICINE

## 2025-08-21 PROCEDURE — 3074F SYST BP LT 130 MM HG: CPT | Performed by: INTERNAL MEDICINE

## 2025-08-21 PROCEDURE — 1160F RVW MEDS BY RX/DR IN RCRD: CPT | Performed by: INTERNAL MEDICINE

## 2025-08-21 RX ORDER — LOSARTAN POTASSIUM 25 MG/1
25 TABLET ORAL DAILY
Qty: 30 TABLET | Refills: 11 | Status: SHIPPED | OUTPATIENT
Start: 2025-08-21 | End: 2026-08-21

## 2025-08-21 RX ORDER — ROSUVASTATIN CALCIUM 5 MG/1
5 TABLET, COATED ORAL DAILY
Qty: 90 TABLET | Refills: 3 | Status: SHIPPED | OUTPATIENT
Start: 2025-08-21 | End: 2026-08-21

## 2025-08-22 RX ORDER — PANTOPRAZOLE SODIUM 20 MG/1
20 TABLET, DELAYED RELEASE ORAL
Qty: 30 TABLET | Refills: 11 | Status: SHIPPED | OUTPATIENT
Start: 2025-08-22 | End: 2026-08-22

## (undated) DEVICE — STAPLER, SUREFORM 45, CURVED TIP

## (undated) DEVICE — SUTURE, ETHIBOND, XTRA, 30 IN, 0, CT-1, GREEN

## (undated) DEVICE — ADHESIVE, SKIN, LIQUIBAND EXCEED

## (undated) DEVICE — RELOAD, SUREFORM 45, 2.5 WHITE

## (undated) DEVICE — MANIFOLD, 4 PORT NEPTUNE STANDARD

## (undated) DEVICE — CATHETER TRAY, SURESTEP, 16FR, URINE METER W/STATLOCK

## (undated) DEVICE — BAG, TISSUE RETRIEVAL, 15MM, ANCHOR

## (undated) DEVICE — NEEDLE, INJECTION, ARTICULAR, 25 G X 5 MM - COLONOSCOPE

## (undated) DEVICE — SUTURE, MONOCRYL, 4-0, 18 IN, PS2, UNDYED

## (undated) DEVICE — GOWN, ASTOUND, XL

## (undated) DEVICE — RELOAD, SUREFORM 45, 4.6 BLACK

## (undated) DEVICE — DRAPE, ARM XI

## (undated) DEVICE — SPONGE, HEMOSTATIC, CELLULOSE, SURGICEL, 2 X 14 IN

## (undated) DEVICE — CANNULA REDUCER, DAVINCI XI

## (undated) DEVICE — DRESSING, ISLAND, TELFA, 4 X 5 IN

## (undated) DEVICE — LOOP, VESSEL, MAXI, RED

## (undated) DEVICE — SUTURE, VICRYL, 2-0, 36 IN, CT-1, UNDYED

## (undated) DEVICE — KIT, ROBOTIC, CUSTOM UHC

## (undated) DEVICE — GRASPER, FENESTRATED TIP-UP XI

## (undated) DEVICE — RELOAD, SUREFORM 45, 4.3 GREEN

## (undated) DEVICE — TUBING SET, TRI-LUMEN, FILTERED, F/AIRSEAL

## (undated) DEVICE — BAG, TISSUE RETRIEVAL, 10MM, ANCHOR

## (undated) DEVICE — FORCEPS, CADIERE, DAVINCI XI

## (undated) DEVICE — Device

## (undated) DEVICE — GRASPER, LONG, BIPOLAR, DAVINCI XI

## (undated) DEVICE — TOWEL, SURGICAL, NEURO, O/R, 16 X 26, BLUE, STERILE

## (undated) DEVICE — ACCESS PORT, 12MM, 100MM LENGTH, LOW PROFILE W/BLADELESS OPTICAL TIP

## (undated) DEVICE — ELECTRODE, ELECTROSURGICAL, BLADE, INSULATED, ENT/IMA, STERILE

## (undated) DEVICE — COLLECTION UNIT, DRAINAGE, THORACIC, SINGLE TUBE, DRY SUCTION, ATS COMPATIBLE, OASIS 3600, LF

## (undated) DEVICE — SHEET, SPLIT, CARDIOVASCULAR TIBURON

## (undated) DEVICE — COVER, CART, 45 X 27 X 48 IN, CLEAR

## (undated) DEVICE — GAUZE, KITTNER ROLL, STERILE

## (undated) DEVICE — CATHETER, THORACIC, STRAIGHT, ADULT, 28 FR, PVC

## (undated) DEVICE — SEAL, UNIVERSAL 5-8MM  XI

## (undated) DEVICE — CANNULA SEAL, STAPLER, DAVINCI XI

## (undated) DEVICE — DRAPE, COLUMN, DAVINCI XI

## (undated) DEVICE — DRAPE, TIBURON, SPLIT SHEET, REINF ADH STRIP, 77X122